# Patient Record
Sex: FEMALE | Race: WHITE | NOT HISPANIC OR LATINO | URBAN - METROPOLITAN AREA
[De-identification: names, ages, dates, MRNs, and addresses within clinical notes are randomized per-mention and may not be internally consistent; named-entity substitution may affect disease eponyms.]

---

## 2023-04-25 LAB
ALANINE AMINOTRANSFERASE (SGPT) (U/L) IN SER/PLAS: 17 U/L (ref 7–45)
ALBUMIN (G/DL) IN SER/PLAS: 4.1 G/DL (ref 3.4–5)
ALKALINE PHOSPHATASE (U/L) IN SER/PLAS: 64 U/L (ref 33–136)
ANION GAP IN SER/PLAS: 8 MMOL/L (ref 10–20)
ASPARTATE AMINOTRANSFERASE (SGOT) (U/L) IN SER/PLAS: 23 U/L (ref 9–39)
BILIRUBIN TOTAL (MG/DL) IN SER/PLAS: 0.4 MG/DL (ref 0–1.2)
CALCIUM (MG/DL) IN SER/PLAS: 9.4 MG/DL (ref 8.6–10.3)
CARBON DIOXIDE, TOTAL (MMOL/L) IN SER/PLAS: 32 MMOL/L (ref 21–32)
CHLORIDE (MMOL/L) IN SER/PLAS: 103 MMOL/L (ref 98–107)
CHOLESTEROL (MG/DL) IN SER/PLAS: 162 MG/DL (ref 0–199)
CHOLESTEROL IN HDL (MG/DL) IN SER/PLAS: 67.6 MG/DL
CHOLESTEROL/HDL RATIO: 2.4
CREATININE (MG/DL) IN SER/PLAS: 1.09 MG/DL (ref 0.5–1.05)
ERYTHROCYTE DISTRIBUTION WIDTH (RATIO) BY AUTOMATED COUNT: 13.8 % (ref 11.5–14.5)
ERYTHROCYTE MEAN CORPUSCULAR HEMOGLOBIN CONCENTRATION (G/DL) BY AUTOMATED: 32 G/DL (ref 32–36)
ERYTHROCYTE MEAN CORPUSCULAR VOLUME (FL) BY AUTOMATED COUNT: 88 FL (ref 80–100)
ERYTHROCYTES (10*6/UL) IN BLOOD BY AUTOMATED COUNT: 4.56 X10E12/L (ref 4–5.2)
GFR FEMALE: 55 ML/MIN/1.73M2
GLUCOSE (MG/DL) IN SER/PLAS: 83 MG/DL (ref 74–99)
HEMATOCRIT (%) IN BLOOD BY AUTOMATED COUNT: 40 % (ref 36–46)
HEMOGLOBIN (G/DL) IN BLOOD: 12.8 G/DL (ref 12–16)
LDL: 67 MG/DL (ref 0–99)
LEUKOCYTES (10*3/UL) IN BLOOD BY AUTOMATED COUNT: 4.2 X10E9/L (ref 4.4–11.3)
NRBC (PER 100 WBCS) BY AUTOMATED COUNT: 0 /100 WBC (ref 0–0)
PLATELETS (10*3/UL) IN BLOOD AUTOMATED COUNT: 214 X10E9/L (ref 150–450)
POTASSIUM (MMOL/L) IN SER/PLAS: 3.6 MMOL/L (ref 3.5–5.3)
PROTEIN TOTAL: 7.3 G/DL (ref 6.4–8.2)
SODIUM (MMOL/L) IN SER/PLAS: 139 MMOL/L (ref 136–145)
THYROTROPIN (MIU/L) IN SER/PLAS BY DETECTION LIMIT <= 0.05 MIU/L: 4.11 MIU/L (ref 0.44–3.98)
TRIGLYCERIDE (MG/DL) IN SER/PLAS: 135 MG/DL (ref 0–149)
UREA NITROGEN (MG/DL) IN SER/PLAS: 11 MG/DL (ref 6–23)
VLDL: 27 MG/DL (ref 0–40)

## 2023-08-17 LAB
ANION GAP IN SER/PLAS: 11 MMOL/L (ref 10–20)
CALCIUM (MG/DL) IN SER/PLAS: 9.7 MG/DL (ref 8.6–10.3)
CARBON DIOXIDE, TOTAL (MMOL/L) IN SER/PLAS: 30 MMOL/L (ref 21–32)
CHLORIDE (MMOL/L) IN SER/PLAS: 103 MMOL/L (ref 98–107)
CREATININE (MG/DL) IN SER/PLAS: 1.04 MG/DL (ref 0.5–1.05)
ERYTHROCYTE DISTRIBUTION WIDTH (RATIO) BY AUTOMATED COUNT: 13.4 % (ref 11.5–14.5)
ERYTHROCYTE MEAN CORPUSCULAR HEMOGLOBIN CONCENTRATION (G/DL) BY AUTOMATED: 32.8 G/DL (ref 32–36)
ERYTHROCYTE MEAN CORPUSCULAR VOLUME (FL) BY AUTOMATED COUNT: 85 FL (ref 80–100)
ERYTHROCYTES (10*6/UL) IN BLOOD BY AUTOMATED COUNT: 4.78 X10E12/L (ref 4–5.2)
GFR FEMALE: 59 ML/MIN/1.73M2
GLUCOSE (MG/DL) IN SER/PLAS: 96 MG/DL (ref 74–99)
HEMATOCRIT (%) IN BLOOD BY AUTOMATED COUNT: 40.8 % (ref 36–46)
HEMOGLOBIN (G/DL) IN BLOOD: 13.4 G/DL (ref 12–16)
LEUKOCYTES (10*3/UL) IN BLOOD BY AUTOMATED COUNT: 5.3 X10E9/L (ref 4.4–11.3)
NRBC (PER 100 WBCS) BY AUTOMATED COUNT: 0 /100 WBC (ref 0–0)
PLATELETS (10*3/UL) IN BLOOD AUTOMATED COUNT: 210 X10E9/L (ref 150–450)
POTASSIUM (MMOL/L) IN SER/PLAS: 3.7 MMOL/L (ref 3.5–5.3)
SODIUM (MMOL/L) IN SER/PLAS: 140 MMOL/L (ref 136–145)
UREA NITROGEN (MG/DL) IN SER/PLAS: 12 MG/DL (ref 6–23)

## 2023-08-30 ENCOUNTER — HOSPITAL ENCOUNTER (OUTPATIENT)
Dept: DATA CONVERSION | Facility: HOSPITAL | Age: 68
End: 2023-08-30
Attending: SURGERY | Admitting: SURGERY
Payer: MEDICARE

## 2023-08-30 DIAGNOSIS — N60.92 UNSPECIFIED BENIGN MAMMARY DYSPLASIA OF LEFT BREAST: ICD-10-CM

## 2023-08-30 DIAGNOSIS — C50.412 MALIGNANT NEOPLASM OF UPPER-OUTER QUADRANT OF LEFT FEMALE BREAST (MULTI): ICD-10-CM

## 2023-09-08 LAB
COMPLETE PATHOLOGY REPORT: NORMAL
CONVERTED ADDENDUM DIAGNOSIS 2: NORMAL
CONVERTED CLINICAL DIAGNOSIS-HISTORY: NORMAL
CONVERTED FINAL DIAGNOSIS: NORMAL
CONVERTED FINAL REPORT PDF LINK TO COPY AND PASTE: NORMAL
CONVERTED GROSS DESCRIPTION: NORMAL
CONVERTED INTRAOPERATIVE DIAGNOSIS: NORMAL
CONVERTED SYNOPTIC DIAGNOSIS: NORMAL

## 2023-09-26 PROBLEM — Z17.0 MALIGNANT NEOPLASM OF UPPER-OUTER QUADRANT OF LEFT BREAST IN FEMALE, ESTROGEN RECEPTOR POSITIVE (MULTI): Status: ACTIVE | Noted: 2023-09-26

## 2023-09-26 PROBLEM — R92.8 ABNORMAL MAMMOGRAM OF LEFT BREAST: Status: ACTIVE | Noted: 2023-09-26

## 2023-09-26 PROBLEM — C50.412 MALIGNANT NEOPLASM OF UPPER-OUTER QUADRANT OF LEFT BREAST IN FEMALE, ESTROGEN RECEPTOR POSITIVE (MULTI): Status: ACTIVE | Noted: 2023-09-26

## 2023-09-26 PROBLEM — C50.912 INVASIVE LOBULAR CARCINOMA OF LEFT BREAST IN FEMALE (MULTI): Status: ACTIVE | Noted: 2023-09-26

## 2023-09-29 VITALS — WEIGHT: 123.9 LBS | BODY MASS INDEX: 24.32 KG/M2 | HEIGHT: 60 IN

## 2023-09-30 NOTE — H&P
History & Physical Reviewed:   I have reviewed the History and Physical dated:  17-Aug-2023   History and Physical reviewed and relevant findings noted. Patient examined to review pertinent physical  findings.: No significant changes   Home Medications Reviewed: no changes noted   Allergies Reviewed: no changes noted       ERAS (Enhanced Recovery After Surgery):  ·  ERAS Patient: no     Consent:   COVID-19 Consent:  ·  COVID-19 Risk Consent Surgeon has reviewed key risks related to the risk of radha COVID-19 and if they contract COVID-19 what the risks are.       Electronic Signatures:  Mirian Blanchard ()  (Signed 30-Aug-2023 12:10)   Authored: History & Physical Reviewed, ERAS, Consent,  Note Completion      Last Updated: 30-Aug-2023 12:10 by Mirian Blanchard ()

## 2023-10-01 NOTE — OP NOTE
PROCEDURE DETAILS    Preoperative Diagnosis:  Primary malignant neoplasm of upper outer quadrant of left female breast, C50.412    Postoperative Diagnosis:  Primary malignant neoplasm of upper outer quadrant of left female breast, C50.412    Surgeon: Mirian Blanchard  Resident/Fellow/Other Assistant: Ida Cox    Procedure:  1. LEFT MAG SEED LOCALIZED PARTIAL MASTECTOMY   2. LYMPHATIC MAPPING WITH DUAL TRACER  3. LEFT AXILLARY SENTINEL LYMPH NODE BIOPSY    Anesthesia: No anesthesiologist associated with this case  Estimated Blood Loss: 5ml  Findings: 1. 1 axillary sentinel lymph node negative for metastasis on frozen section  2. seed, clip present on specimen xray        Operative Report:     The patient underwent pre-operative magnetic seed localization in the radiology department prior to surgery.  Localization studies were reviewed confirming appropriate localization.  In the pre-op bay the patient's left breast was marked and injected  with technetium 99 in a periareolar 4 quadrant fashion. The patient was then transported to the operative suite. A sign-in was performed verifying patient identity, procedure and laterality.  One dose of preoperative antibiotics was given.  After induction  of anesthesia, uptake into the corresponding axilla was confirmed using the handheld gamma probe.  3cc of isosulfan blue was injected in the subareolar space and massaged for several minutes. The left breast and axilla were prepped and draped in the usual  sterile fashion.   Just prior to incision, a time-out was performed confirming patient identity, procedure and laterality.  Attention was first turned toward the axilla. The handheld gamma probe was used to identify the area of greatest uptake in the  axilla and the area was marked on the skin.  An incision was made in the axilla and carried down through the subcutaneous tissue and clavipectoral fascia to gain entry into the axilla.  The handheld gamma probe was  used to identify a hot node which was  excised in its entirety.   The axilla was palpated and did not reveal any palpably abnormal or blue lymph nodes.  Frozen section was negative for guerda metastasis. The clavipectoral fascia was re-approximated with 2-0 Vicryl.  The dermis was closed with  3-0 Vicryl and the skin was close with a running 4-0 Monocryl. Attention was then turned to the breast. The sentimag probe was used to identify the location of the targeted lesion and marked on the skin.  A periareolar incision was made, and flaps were  raised in the direction of the targeted lesion.  The target lesion was then circumferentially dissected using electrocautery.  Circumferential dissection was carried out to include the targeted lesion and localization seed.  The probe was used to again  confirm the presence of the localization seed within the specimen. Once the specimen was completed dissected it was oriented with a silk suture- short suture superior, long suture lateral and passed off the field.   The Vector ink kit was used to ink  the specimen: red = superior, blue = inferior, yellow = medial, orange = lateral, green = anterior, and black = posterior. A specimen radiograph was performed which confirmed the presence of the lesion, biopsy clip, and localization seed.  Five additional  cavity margins were taken: superior, inferior, medial, lateral, and anterior with black ink denoting the new margin.  The posterior extent of dissection  did include pectoralis fascia. Clips were placed to denote the area of resection. Next, meticulous  hemostasis was achieved.  The dermis was closed with 3-0 Vicryl suture and the skin was closed with a running 4-0 Monocryl.  Benzoin and steri-strips were used as dressing.  The patient was then awoken from anesthesia and transported to the PACU in satisfactory  condition.    SPECIMENS:    1. Left axillary sentinel nodes  2. Left magseed localized partial mastectomy: short suture  superior, long lateral  3. New superior margin: ink marks new margin  4. New inferior margin: ink marks new margin  5. New medial margin: ink marks new margin  6. New lateral margin: ink marks new margin  7. New anterior margin: ink marks new margin      John Synoptic Op Report:  Breast Osseo Node Biopsy  Operation performed with curative intent: yes  Tracer(s) used to identify sentinel nodes in the upfront surgery (non-neoadjuvant) setting: dye and radioactive tracer  Tracer(s) used to identify sentinel nodes in the neoadjuvant setting: not applicable  All nodes (colored or non-colored) present at the end of a dye-filled lymphatic channel were removed: yes  All significantly radioactive nodes were removed: yes  All palpably suspicious nodes were removed: not applicable  Biopsy-proven positive nodes marked with clips prior to chemotherapy were identified and removed: not applicable                    Attestation:   Note Completion:  Attending Attestation I performed the procedure without a resident         Electronic Signatures:  Mirian Blanchard)  (Signed 30-Aug-2023 12:19)   Authored: Post-Operative Note, Chart Review, Note Completion      Last Updated: 30-Aug-2023 12:19 by Mirian Blanchard ()

## 2023-10-02 ASSESSMENT — PATIENT HEALTH QUESTIONNAIRE - PHQ9
SUM OF ALL RESPONSES TO PHQ QUESTIONS 1-9: 6
8. MOVING OR SPEAKING SO SLOWLY THAT OTHER PEOPLE COULD HAVE NOTICED. OR THE OPPOSITE, BEING SO FIGETY OR RESTLESS THAT YOU HAVE BEEN MOVING AROUND A LOT MORE THAN USUAL: 0
5. POOR APPETITE OR OVEREATING: MORE THAN HALF THE DAYS
1. LITTLE INTEREST OR PLEASURE IN DOING THINGS: NOT AT ALL
1. LITTLE INTEREST OR PLEASURE IN DOING THINGS: 0
1. LITTLE INTEREST OR PLEASURE IN DOING THINGS: NOT AT ALL
7. TROUBLE CONCENTRATING ON THINGS, SUCH AS READING THE NEWSPAPER OR WATCHING TELEVISION: SEVERAL DAYS
10. IF YOU CHECKED OFF ANY PROBLEMS, HOW DIFFICULT HAVE THESE PROBLEMS MADE IT FOR YOU TO DO YOUR WORK, TAKE CARE OF THINGS AT HOME, OR GET ALONG WITH OTHER PEOPLE: NOT DIFFICULT AT ALL
7. TROUBLE CONCENTRATING ON THINGS, SUCH AS READING THE NEWSPAPER OR WATCHING TELEVISION: 1
8. MOVING OR SPEAKING SO SLOWLY THAT OTHER PEOPLE COULD HAVE NOTICED. OR THE OPPOSITE, BEING SO FIGETY OR RESTLESS THAT YOU HAVE BEEN MOVING AROUND A LOT MORE THAN USUAL: NOT AT ALL
6. FEELING BAD ABOUT YOURSELF - OR THAT YOU ARE A FAILURE OR HAVE LET YOURSELF OR YOUR FAMILY DOWN: SEVERAL DAYS
SUM OF ALL RESPONSES TO PHQ QUESTIONS 1-9: 6
9. THOUGHTS THAT YOU WOULD BE BETTER OFF DEAD, OR OF HURTING YOURSELF: 0
9. THOUGHTS THAT YOU WOULD BE BETTER OFF DEAD, OR OF HURTING YOURSELF: NOT AT ALL
10. IF YOU CHECKED OFF ANY PROBLEMS, HOW DIFFICULT HAVE THESE PROBLEMS MADE IT FOR YOU TO DO YOUR WORK, TAKE CARE OF THINGS AT HOME, OR GET ALONG WITH OTHER PEOPLE: NOT DIFFICULT AT ALL
3. TROUBLE FALLING OR STAYING ASLEEP OR SLEEPING TOO MUCH: NOT AT ALL
2. FEELING DOWN, DEPRESSED, IRRITABLE, OR HOPELESS: SEVERAL DAYS
2. FEELING DOWN, DEPRESSED, IRRITABLE, OR HOPELESS: 1
4. FEELING TIRED OR HAVING LITTLE ENERGY: SEVERAL DAYS
9. THOUGHTS THAT YOU WOULD BE BETTER OFF DEAD, OR OF HURTING YOURSELF: NOT AT ALL
6. FEELING BAD ABOUT YOURSELF - OR THAT YOU ARE A FAILURE OR HAVE LET YOURSELF OR YOUR FAMILY DOWN: 1
3. TROUBLE FALLING OR STAYING ASLEEP OR SLEEPING TOO MUCH: NOT AT ALL
3. TROUBLE FALLING OR STAYING ASLEEP OR SLEEPING TOO MUCH: 0
7. TROUBLE CONCENTRATING ON THINGS, SUCH AS READING THE NEWSPAPER OR WATCHING TELEVISION: SEVERAL DAYS
4. FEELING TIRED OR HAVING LITTLE ENERGY: 1
5. POOR APPETITE OR OVEREATING: 2
6. FEELING BAD ABOUT YOURSELF - OR THAT YOU ARE A FAILURE OR HAVE LET YOURSELF OR YOUR FAMILY DOWN: SEVERAL DAYS
4. FEELING TIRED OR HAVING LITTLE ENERGY: SEVERAL DAYS
2. FEELING DOWN, DEPRESSED OR HOPELESS: SEVERAL DAYS
8. MOVING OR SPEAKING SO SLOWLY THAT OTHER PEOPLE COULD HAVE NOTICED. OR THE OPPOSITE, BEING SO FIGETY OR RESTLESS THAT YOU HAVE BEEN MOVING AROUND A LOT MORE THAN USUAL: NOT AT ALL
5. POOR APPETITE OR OVEREATING: MORE THAN HALF THE DAYS

## 2023-10-03 ENCOUNTER — OFFICE VISIT (OUTPATIENT)
Dept: HEMATOLOGY/ONCOLOGY | Facility: CLINIC | Age: 68
End: 2023-10-03
Payer: MEDICARE

## 2023-10-03 DIAGNOSIS — Z79.899 ENCOUNTER FOR MONITORING CARDIOTOXIC DRUG THERAPY: Primary | ICD-10-CM

## 2023-10-03 DIAGNOSIS — C50.912 INVASIVE LOBULAR CARCINOMA OF LEFT BREAST IN FEMALE (MULTI): ICD-10-CM

## 2023-10-03 DIAGNOSIS — Z51.81 ENCOUNTER FOR MONITORING CARDIOTOXIC DRUG THERAPY: Primary | ICD-10-CM

## 2023-10-03 PROCEDURE — 99214 OFFICE O/P EST MOD 30 MIN: CPT | Performed by: STUDENT IN AN ORGANIZED HEALTH CARE EDUCATION/TRAINING PROGRAM

## 2023-10-03 PROCEDURE — 1126F AMNT PAIN NOTED NONE PRSNT: CPT | Performed by: STUDENT IN AN ORGANIZED HEALTH CARE EDUCATION/TRAINING PROGRAM

## 2023-10-03 PROCEDURE — 1159F MED LIST DOCD IN RCRD: CPT | Performed by: STUDENT IN AN ORGANIZED HEALTH CARE EDUCATION/TRAINING PROGRAM

## 2023-10-03 RX ORDER — METHYLPREDNISOLONE SODIUM SUCCINATE 40 MG/ML
40 INJECTION INTRAMUSCULAR; INTRAVENOUS AS NEEDED
Status: CANCELLED | OUTPATIENT
Start: 2023-10-17

## 2023-10-03 RX ORDER — FAMOTIDINE 10 MG/ML
20 INJECTION INTRAVENOUS ONCE AS NEEDED
Status: CANCELLED | OUTPATIENT
Start: 2023-10-17

## 2023-10-03 RX ORDER — PROCHLORPERAZINE EDISYLATE 5 MG/ML
10 INJECTION INTRAMUSCULAR; INTRAVENOUS EVERY 6 HOURS PRN
Status: CANCELLED | OUTPATIENT
Start: 2023-10-17

## 2023-10-03 RX ORDER — EPINEPHRINE 0.3 MG/.3ML
0.3 INJECTION SUBCUTANEOUS EVERY 5 MIN PRN
Status: CANCELLED | OUTPATIENT
Start: 2023-10-17

## 2023-10-03 RX ORDER — PROCHLORPERAZINE MALEATE 5 MG
10 TABLET ORAL EVERY 6 HOURS PRN
Status: CANCELLED | OUTPATIENT
Start: 2023-10-17

## 2023-10-03 RX ORDER — DIPHENHYDRAMINE HYDROCHLORIDE 50 MG/ML
50 INJECTION INTRAMUSCULAR; INTRAVENOUS AS NEEDED
Status: CANCELLED | OUTPATIENT
Start: 2023-10-17

## 2023-10-03 RX ORDER — FAMOTIDINE 10 MG/ML
20 INJECTION INTRAVENOUS ONCE
Status: CANCELLED | OUTPATIENT
Start: 2023-10-17

## 2023-10-03 RX ORDER — HEPARIN SODIUM,PORCINE/PF 10 UNIT/ML
50 SYRINGE (ML) INTRAVENOUS AS NEEDED
Status: CANCELLED | OUTPATIENT
Start: 2023-10-17

## 2023-10-03 RX ORDER — ALBUTEROL SULFATE 0.83 MG/ML
3 SOLUTION RESPIRATORY (INHALATION) AS NEEDED
Status: CANCELLED | OUTPATIENT
Start: 2023-10-17

## 2023-10-03 RX ORDER — DEXAMETHASONE SODIUM PHOSPHATE 100 MG/10ML
10 INJECTION INTRAMUSCULAR; INTRAVENOUS ONCE
Status: CANCELLED | OUTPATIENT
Start: 2023-10-17

## 2023-10-03 RX ORDER — DIPHENHYDRAMINE HCL 50 MG
50 CAPSULE ORAL ONCE
Status: CANCELLED | OUTPATIENT
Start: 2023-10-17

## 2023-10-03 RX ORDER — HEPARIN 100 UNIT/ML
500 SYRINGE INTRAVENOUS AS NEEDED
Status: CANCELLED | OUTPATIENT
Start: 2023-10-17

## 2023-10-03 ASSESSMENT — PAIN SCALES - GENERAL: PAINLEVEL: 0-NO PAIN

## 2023-10-03 NOTE — PROGRESS NOTES
Patient ID: Zina Hernandez is a 67 y.o. female.    The patient presents to clinic today for her history of breast cancer.    Diagnostic/Therapeutic History:  Cancer History:          Breast         AJCC Edition: 8th (AJCC), Diagnosis Date: 30-Aug-2023, IA, pT1c pN0 cM0 G2     Treatment Synopsis:    - On 6/14/2023 screening mammogram she had 2 adjacent irregular spiculated masses in the upper outer left breast at middle depth.  No suspicious masses or calcification  in the right breast.  BI-RADS Category 0     -On 6/28/2023 she had targeted ultrasound that showed at 1:00, 4 cm from the nipple an irregular hypoechoic mass measuring 3 x 5 x 4 mm this corresponds to the mammographic finding.  Left axilla showed 4 morphologically normal lymph nodes without lymphadenopathy      -On 7/14/2023 she had left breast mass ultrasound guided core needle  invasive lobular carcinoma, grade 2 with  ER 95%, [place] 20%, HER2 positive 3+, also had lobular carcinoma in situ     -On 8/30/2023 Dr. Blanchard performed left partial mastectomy with sentinel lymph node biopsy (0/1) 2 foci of cancer largest 1 measuring 13 mm, second 1 measuring 7 mm, margins were negative final stage PT1CN0     History of Present Illness (HPI)/Interval History:    Patient overall doing fine, recovering well from surgery, overall overwhelmed about her recent diagnosis of breast cancer.  .  No headaches  no blurred vision, no chest pain or shortness of breath.  No abdominal pain no nausea no vomiting.  Appetite okay weight is maintained. All questions have been answered today.     14 point review of system otherwise unremarkable     PMH: as above     Meds: allergies reviewed      Reproductive history: Menarche at 13, AFLB: 27,  menopause at 50, no HRT     Family history maternal grandmother with uterine cancer in her 60s    Review of Systems:  14-point ROS otherwise negative, as per HPI.    History reviewed. No pertinent past medical history.    History reviewed. No  "pertinent surgical history.    Social History     Socioeconomic History    Marital status:      Spouse name: None    Number of children: None    Years of education: None    Highest education level: None   Occupational History    None   Tobacco Use    Smoking status: None    Smokeless tobacco: None   Substance and Sexual Activity    Alcohol use: None    Drug use: None    Sexual activity: None   Other Topics Concern    None   Social History Narrative    None     Social Determinants of Health     Financial Resource Strain: Not on file   Food Insecurity: Not on file   Transportation Needs: Not on file   Physical Activity: Not on file   Stress: Not on file   Social Connections: Not on file   Intimate Partner Violence: Not on file   Housing Stability: Not on file       Allergies   Allergen Reactions    Tamiflu [Oseltamivir] Unknown       No current outpatient medications on file.     Objective    BSA: 1.52 meters squared  /83 (BP Location: Left arm, Patient Position: Sitting)   Pulse 79   Temp 36.7 °C (98.1 °F) (Temporal)   Resp 16   Ht (S) 1.506 m (4' 11.29\")   Wt 55.5 kg (122 lb 5.7 oz)   SpO2 93%   BMI 24.47 kg/m²     ECOG Performance Status:  1 Restricted in physically strenuous activity but ambulatory and able to carry out work of a light or sedentary nature, e.g., light house work, office work.      Physical Exam    Constitutional: Well developed, awake/alert/oriented  x3, no distress, alert and cooperative   Eyes: PERRL, EOMI, clear sclera   ENMT: mucous membranes moist, no apparent injury,  no lesions seen   Head/Neck: Neck supple, no apparent injury, thyroid  without mass or tenderness, No JVD, trachea midline, no bruits   Respiratory/Thorax: Patent airways, CTAB, normal  breath sounds with good chest expansion, thorax symmetric   Cardiovascular: Regular, rate and rhythm, no murmurs,  2+ equal pulses of the extremities, normal S 1and S 2   Gastrointestinal: Nondistended, soft, non-tender,  no " rebound tenderness or guarding, no masses palpable, no organomegaly, +BS, no bruits   Extremities: normal extremities, no cyanosis edema,  contusions or wounds, no clubbing   Breast: Left surgical incision healing well, +seroma,  no new nodules or lymphadenopathy.  No right breast nodules or lymphadenopathy        Laboratory Data:  Lab Results   Component Value Date    WBC 5.3 08/17/2023    HGB 13.4 08/17/2023    HCT 40.8 08/17/2023    MCV 85 08/17/2023     08/17/2023       Chemistry    Lab Results   Component Value Date/Time     08/17/2023 0822    K 3.7 08/17/2023 0822     08/17/2023 0822    CO2 30 08/17/2023 0822    BUN 12 08/17/2023 0822    CREATININE 1.04 08/17/2023 0822    Lab Results   Component Value Date/Time    CALCIUM 9.7 08/17/2023 0822    ALKPHOS 64 04/25/2023 0712    AST 23 04/25/2023 0712    ALT 17 04/25/2023 0712    BILITOT 0.4 04/25/2023 0712                  Assessment/Plan:       67-year-old female previously healthy found to have on screening mammogram 2 adjacent irregular spiculated masses in the upper outer left breast at middle depth with  US showing a 5mm mass with left axilla showing 4 negative LN s/p  guided core needle  invasive lobular carcinoma, grade 2 with  ER 95%, [place] 20%, HER2 positive 3+, also had lobular carcinoma in situ. Dr. Blanchard performed left partial mastectomy with sentinel  lymph node biopsy (0/1) 2 foci of cancer largest 1 measuring 13 mm, second 1 measuring 7 mm, margins were negative final stage PT1CN0. She is presenting to discuss adjuvant treatment options.      I reviewed with her the events that led to her diagnosis of breast cancer. We reviewed all the procedures and diagnostic imaging she underwent thus far. I discussed the features of her breast cancer that include the size, grade, lymph node status and  hormone receptor/ her2-ying status.     Based on APT trial recommended to proceed with THx12 weeks followed by radiation followed by hormonal  therapy and herceptin to complete for a total of 1 year     Based on the updated 10 year results of the APT trial published in the lancet in March of this year:      410 patients were enrolled and 406 were given adjuvant paclitaxel and trastuzumab and included in the analysis. Mean age at enrolment was 55 years (SD 10·5), 405 (99·8%) of 406 patients were female and one (0·2%) was male, 350 (86·2%) were  White, 28 (6·9%) were Black or , and 272 (67·0%) had hormone receptor-positive disease. After a median follow-up of 10·8 years (IQR 7·1-11·4), among 406 patients included in the analysis population, we observed 31  invasive disease-free survival events, of which six (19·4%) were locoregional ipsilateral recurrences, nine (29·0%) were new contralateral breast cancers, six (19·4%) were distant recurrences, and ten (32·3%) were all-cause deaths.  10-year invasive disease-free survival was 91·3% (95% CI 88·3-94·4), 10-year recurrence-free interval was 96·3% (95% CI 94·3-98·3), 10-year overall survival was 94·3% (95% CI 91·8-96·8), and 10-year breast  cancer-specific survival was 98·8% (95% CI 97·6-100)                        Plan:    - plan to do dose reduced taxol 60mg/m2 and herceptin weekly x12 weeks, followed by radiation therapy followed hormonal therapy followed  by herceptin for a total of 1 year.      - RTC in 2 weeks      At least 60 minutes of direct consultation was spent reviewing the patient's chart as well as discussing and  reviewing the  cancer care plan including educating and answering  questions and concerns, greater than 50 percent spent in counseling and coordination  of care.    Cony Amin MD  Hematology and Medical Oncology  [facility]

## 2023-10-03 NOTE — H&P (VIEW-ONLY)
Patient ID: Zina Hernandez is a 67 y.o. female.    The patient presents to clinic today for her history of breast cancer.    Diagnostic/Therapeutic History:  Cancer History:          Breast         AJCC Edition: 8th (AJCC), Diagnosis Date: 30-Aug-2023, IA, pT1c pN0 cM0 G2     Treatment Synopsis:    - On 6/14/2023 screening mammogram she had 2 adjacent irregular spiculated masses in the upper outer left breast at middle depth.  No suspicious masses or calcification  in the right breast.  BI-RADS Category 0     -On 6/28/2023 she had targeted ultrasound that showed at 1:00, 4 cm from the nipple an irregular hypoechoic mass measuring 3 x 5 x 4 mm this corresponds to the mammographic finding.  Left axilla showed 4 morphologically normal lymph nodes without lymphadenopathy      -On 7/14/2023 she had left breast mass ultrasound guided core needle  invasive lobular carcinoma, grade 2 with  ER 95%, [place] 20%, HER2 positive 3+, also had lobular carcinoma in situ     -On 8/30/2023 Dr. Blanchard performed left partial mastectomy with sentinel lymph node biopsy (0/1) 2 foci of cancer largest 1 measuring 13 mm, second 1 measuring 7 mm, margins were negative final stage PT1CN0     History of Present Illness (HPI)/Interval History:    Patient overall doing fine, recovering well from surgery, overall overwhelmed about her recent diagnosis of breast cancer.  .  No headaches  no blurred vision, no chest pain or shortness of breath.  No abdominal pain no nausea no vomiting.  Appetite okay weight is maintained. All questions have been answered today.     14 point review of system otherwise unremarkable     PMH: as above     Meds: allergies reviewed      Reproductive history: Menarche at 13, AFLB: 27,  menopause at 50, no HRT     Family history maternal grandmother with uterine cancer in her 60s    Review of Systems:  14-point ROS otherwise negative, as per HPI.    History reviewed. No pertinent past medical history.    History reviewed. No  "pertinent surgical history.    Social History     Socioeconomic History    Marital status:      Spouse name: None    Number of children: None    Years of education: None    Highest education level: None   Occupational History    None   Tobacco Use    Smoking status: None    Smokeless tobacco: None   Substance and Sexual Activity    Alcohol use: None    Drug use: None    Sexual activity: None   Other Topics Concern    None   Social History Narrative    None     Social Determinants of Health     Financial Resource Strain: Not on file   Food Insecurity: Not on file   Transportation Needs: Not on file   Physical Activity: Not on file   Stress: Not on file   Social Connections: Not on file   Intimate Partner Violence: Not on file   Housing Stability: Not on file       Allergies   Allergen Reactions    Tamiflu [Oseltamivir] Unknown       No current outpatient medications on file.     Objective    BSA: 1.52 meters squared  /83 (BP Location: Left arm, Patient Position: Sitting)   Pulse 79   Temp 36.7 °C (98.1 °F) (Temporal)   Resp 16   Ht (S) 1.506 m (4' 11.29\")   Wt 55.5 kg (122 lb 5.7 oz)   SpO2 93%   BMI 24.47 kg/m²     ECOG Performance Status:  1 Restricted in physically strenuous activity but ambulatory and able to carry out work of a light or sedentary nature, e.g., light house work, office work.      Physical Exam    Constitutional: Well developed, awake/alert/oriented  x3, no distress, alert and cooperative   Eyes: PERRL, EOMI, clear sclera   ENMT: mucous membranes moist, no apparent injury,  no lesions seen   Head/Neck: Neck supple, no apparent injury, thyroid  without mass or tenderness, No JVD, trachea midline, no bruits   Respiratory/Thorax: Patent airways, CTAB, normal  breath sounds with good chest expansion, thorax symmetric   Cardiovascular: Regular, rate and rhythm, no murmurs,  2+ equal pulses of the extremities, normal S 1and S 2   Gastrointestinal: Nondistended, soft, non-tender,  no " rebound tenderness or guarding, no masses palpable, no organomegaly, +BS, no bruits   Extremities: normal extremities, no cyanosis edema,  contusions or wounds, no clubbing   Breast: Left surgical incision healing well, +seroma,  no new nodules or lymphadenopathy.  No right breast nodules or lymphadenopathy        Laboratory Data:  Lab Results   Component Value Date    WBC 5.3 08/17/2023    HGB 13.4 08/17/2023    HCT 40.8 08/17/2023    MCV 85 08/17/2023     08/17/2023       Chemistry    Lab Results   Component Value Date/Time     08/17/2023 0822    K 3.7 08/17/2023 0822     08/17/2023 0822    CO2 30 08/17/2023 0822    BUN 12 08/17/2023 0822    CREATININE 1.04 08/17/2023 0822    Lab Results   Component Value Date/Time    CALCIUM 9.7 08/17/2023 0822    ALKPHOS 64 04/25/2023 0712    AST 23 04/25/2023 0712    ALT 17 04/25/2023 0712    BILITOT 0.4 04/25/2023 0712                  Assessment/Plan:       67-year-old female previously healthy found to have on screening mammogram 2 adjacent irregular spiculated masses in the upper outer left breast at middle depth with  US showing a 5mm mass with left axilla showing 4 negative LN s/p  guided core needle  invasive lobular carcinoma, grade 2 with  ER 95%, [place] 20%, HER2 positive 3+, also had lobular carcinoma in situ. Dr. Blanchard performed left partial mastectomy with sentinel  lymph node biopsy (0/1) 2 foci of cancer largest 1 measuring 13 mm, second 1 measuring 7 mm, margins were negative final stage PT1CN0. She is presenting to discuss adjuvant treatment options.      I reviewed with her the events that led to her diagnosis of breast cancer. We reviewed all the procedures and diagnostic imaging she underwent thus far. I discussed the features of her breast cancer that include the size, grade, lymph node status and  hormone receptor/ her2-ying status.     Based on APT trial recommended to proceed with THx12 weeks followed by radiation followed by hormonal  therapy and herceptin to complete for a total of 1 year     Based on the updated 10 year results of the APT trial published in the lancet in March of this year:      410 patients were enrolled and 406 were given adjuvant paclitaxel and trastuzumab and included in the analysis. Mean age at enrolment was 55 years (SD 10·5), 405 (99·8%) of 406 patients were female and one (0·2%) was male, 350 (86·2%) were  White, 28 (6·9%) were Black or , and 272 (67·0%) had hormone receptor-positive disease. After a median follow-up of 10·8 years (IQR 7·1-11·4), among 406 patients included in the analysis population, we observed 31  invasive disease-free survival events, of which six (19·4%) were locoregional ipsilateral recurrences, nine (29·0%) were new contralateral breast cancers, six (19·4%) were distant recurrences, and ten (32·3%) were all-cause deaths.  10-year invasive disease-free survival was 91·3% (95% CI 88·3-94·4), 10-year recurrence-free interval was 96·3% (95% CI 94·3-98·3), 10-year overall survival was 94·3% (95% CI 91·8-96·8), and 10-year breast  cancer-specific survival was 98·8% (95% CI 97·6-100)                        Plan:    - plan to do dose reduced taxol 60mg/m2 and herceptin weekly x12 weeks, followed by radiation therapy followed hormonal therapy followed  by herceptin for a total of 1 year.      - RTC in 2 weeks      At least 60 minutes of direct consultation was spent reviewing the patient's chart as well as discussing and  reviewing the  cancer care plan including educating and answering  questions and concerns, greater than 50 percent spent in counseling and coordination  of care.    Cony Amin MD  Hematology and Medical Oncology  [facility]

## 2023-10-03 NOTE — PATIENT INSTRUCTIONS
If you have any questions or concerns please call [phone]  Schedule your Echocardiogram and Mediport placement   Schedule a follow up visit with Dr Amin and your first treatment for on 10/17  Please have labs drawn

## 2023-10-04 ENCOUNTER — TELEPHONE (OUTPATIENT)
Dept: HEMATOLOGY/ONCOLOGY | Facility: HOSPITAL | Age: 68
End: 2023-10-04
Payer: MEDICARE

## 2023-10-04 VITALS
BODY MASS INDEX: 24.67 KG/M2 | WEIGHT: 122.36 LBS | HEART RATE: 79 BPM | HEIGHT: 59 IN | SYSTOLIC BLOOD PRESSURE: 151 MMHG | DIASTOLIC BLOOD PRESSURE: 83 MMHG | RESPIRATION RATE: 16 BRPM | TEMPERATURE: 98.1 F | OXYGEN SATURATION: 93 %

## 2023-10-04 NOTE — TELEPHONE ENCOUNTER
RN called and spoke with patient directly. Reviewed schedule for Echocardiogram. Answered all other questions.

## 2023-10-09 ENCOUNTER — LAB (OUTPATIENT)
Dept: LAB | Facility: LAB | Age: 68
End: 2023-10-09
Payer: MEDICARE

## 2023-10-09 DIAGNOSIS — C50.912 INVASIVE LOBULAR CARCINOMA OF LEFT BREAST IN FEMALE (MULTI): ICD-10-CM

## 2023-10-09 LAB
ALBUMIN SERPL BCP-MCNC: 4 G/DL (ref 3.4–5)
ALP SERPL-CCNC: 54 U/L (ref 33–136)
ALT SERPL W P-5'-P-CCNC: 12 U/L (ref 7–45)
ANION GAP SERPL CALC-SCNC: 10 MMOL/L (ref 10–20)
AST SERPL W P-5'-P-CCNC: 19 U/L (ref 9–39)
BASOPHILS # BLD AUTO: 0.03 X10*3/UL (ref 0–0.1)
BASOPHILS NFR BLD AUTO: 0.7 %
BILIRUB SERPL-MCNC: 0.3 MG/DL (ref 0–1.2)
BUN SERPL-MCNC: 10 MG/DL (ref 6–23)
CALCIUM SERPL-MCNC: 9.2 MG/DL (ref 8.6–10.3)
CHLORIDE SERPL-SCNC: 102 MMOL/L (ref 98–107)
CO2 SERPL-SCNC: 31 MMOL/L (ref 21–32)
CREAT SERPL-MCNC: 1.03 MG/DL (ref 0.5–1.05)
EOSINOPHIL # BLD AUTO: 0.09 X10*3/UL (ref 0–0.7)
EOSINOPHIL NFR BLD AUTO: 2 %
ERYTHROCYTE [DISTWIDTH] IN BLOOD BY AUTOMATED COUNT: 14.1 % (ref 11.5–14.5)
GFR SERPL CREATININE-BSD FRML MDRD: 59 ML/MIN/1.73M*2
GLUCOSE SERPL-MCNC: 97 MG/DL (ref 74–99)
HCT VFR BLD AUTO: 37.3 % (ref 36–46)
HGB BLD-MCNC: 12 G/DL (ref 12–16)
IMM GRANULOCYTES # BLD AUTO: 0 X10*3/UL (ref 0–0.7)
IMM GRANULOCYTES NFR BLD AUTO: 0 % (ref 0–0.9)
LYMPHOCYTES # BLD AUTO: 1.89 X10*3/UL (ref 1.2–4.8)
LYMPHOCYTES NFR BLD AUTO: 41.8 %
MCH RBC QN AUTO: 28 PG (ref 26–34)
MCHC RBC AUTO-ENTMCNC: 32.2 G/DL (ref 32–36)
MCV RBC AUTO: 87 FL (ref 80–100)
MONOCYTES # BLD AUTO: 0.46 X10*3/UL (ref 0.1–1)
MONOCYTES NFR BLD AUTO: 10.2 %
NEUTROPHILS # BLD AUTO: 2.05 X10*3/UL (ref 1.2–7.7)
NEUTROPHILS NFR BLD AUTO: 45.3 %
NRBC BLD-RTO: 0 /100 WBCS (ref 0–0)
PLATELET # BLD AUTO: 259 X10*3/UL (ref 150–450)
PMV BLD AUTO: 9.5 FL (ref 7.5–11.5)
POTASSIUM SERPL-SCNC: 4.2 MMOL/L (ref 3.5–5.3)
PROT SERPL-MCNC: 6.8 G/DL (ref 6.4–8.2)
RBC # BLD AUTO: 4.29 X10*6/UL (ref 4–5.2)
SODIUM SERPL-SCNC: 139 MMOL/L (ref 136–145)
WBC # BLD AUTO: 4.5 X10*3/UL (ref 4.4–11.3)

## 2023-10-09 PROCEDURE — 80053 COMPREHEN METABOLIC PANEL: CPT

## 2023-10-09 PROCEDURE — 85025 COMPLETE CBC W/AUTO DIFF WBC: CPT

## 2023-10-09 PROCEDURE — 36415 COLL VENOUS BLD VENIPUNCTURE: CPT

## 2023-10-11 ENCOUNTER — HOSPITAL ENCOUNTER (OUTPATIENT)
Dept: RADIOLOGY | Facility: HOSPITAL | Age: 68
Discharge: HOME | End: 2023-10-11
Attending: STUDENT IN AN ORGANIZED HEALTH CARE EDUCATION/TRAINING PROGRAM
Payer: MEDICARE

## 2023-10-11 VITALS
BODY MASS INDEX: 24.6 KG/M2 | RESPIRATION RATE: 18 BRPM | WEIGHT: 122 LBS | HEART RATE: 88 BPM | OXYGEN SATURATION: 98 % | TEMPERATURE: 97.9 F | SYSTOLIC BLOOD PRESSURE: 120 MMHG | DIASTOLIC BLOOD PRESSURE: 83 MMHG | HEIGHT: 59 IN

## 2023-10-11 DIAGNOSIS — Z17.0 MALIGNANT NEOPLASM OF UPPER-OUTER QUADRANT OF LEFT BREAST IN FEMALE, ESTROGEN RECEPTOR POSITIVE (MULTI): Primary | ICD-10-CM

## 2023-10-11 DIAGNOSIS — C50.912 INVASIVE LOBULAR CARCINOMA OF LEFT BREAST IN FEMALE (MULTI): ICD-10-CM

## 2023-10-11 DIAGNOSIS — C50.412 MALIGNANT NEOPLASM OF UPPER-OUTER QUADRANT OF LEFT BREAST IN FEMALE, ESTROGEN RECEPTOR POSITIVE (MULTI): Primary | ICD-10-CM

## 2023-10-11 LAB
ERYTHROCYTE [DISTWIDTH] IN BLOOD BY AUTOMATED COUNT: 14.1 % (ref 11.5–14.5)
HCT VFR BLD AUTO: 40.3 % (ref 36–46)
HGB BLD-MCNC: 12.9 G/DL (ref 12–16)
INR PPP: 1 (ref 0.9–1.1)
MCH RBC QN AUTO: 27.6 PG (ref 26–34)
MCHC RBC AUTO-ENTMCNC: 32 G/DL (ref 32–36)
MCV RBC AUTO: 86 FL (ref 80–100)
NRBC BLD-RTO: 0 /100 WBCS (ref 0–0)
PLATELET # BLD AUTO: 246 X10*3/UL (ref 150–450)
PMV BLD AUTO: 9.4 FL (ref 7.5–11.5)
PROTHROMBIN TIME: 11.7 SECONDS (ref 9.8–12.8)
RBC # BLD AUTO: 4.68 X10*6/UL (ref 4–5.2)
WBC # BLD AUTO: 4.2 X10*3/UL (ref 4.4–11.3)

## 2023-10-11 PROCEDURE — 36010 PLACE CATHETER IN VEIN: CPT | Mod: 59

## 2023-10-11 PROCEDURE — 76937 US GUIDE VASCULAR ACCESS: CPT | Performed by: STUDENT IN AN ORGANIZED HEALTH CARE EDUCATION/TRAINING PROGRAM

## 2023-10-11 PROCEDURE — 7100000002 HC RECOVERY ROOM TIME - EACH INCREMENTAL 1 MINUTE

## 2023-10-11 PROCEDURE — 76937 US GUIDE VASCULAR ACCESS: CPT

## 2023-10-11 PROCEDURE — 2500000004 HC RX 250 GENERAL PHARMACY W/ HCPCS (ALT 636 FOR OP/ED): Performed by: STUDENT IN AN ORGANIZED HEALTH CARE EDUCATION/TRAINING PROGRAM

## 2023-10-11 PROCEDURE — 36415 COLL VENOUS BLD VENIPUNCTURE: CPT | Performed by: RADIOLOGY

## 2023-10-11 PROCEDURE — 7100000001 HC RECOVERY ROOM TIME - INITIAL BASE CHARGE

## 2023-10-11 PROCEDURE — 2720000007 HC OR 272 NO HCPCS

## 2023-10-11 PROCEDURE — 77001 FLUOROGUIDE FOR VEIN DEVICE: CPT | Mod: 59

## 2023-10-11 PROCEDURE — 36561 INSERT TUNNELED CV CATH: CPT

## 2023-10-11 PROCEDURE — C1788 PORT, INDWELLING, IMP: HCPCS

## 2023-10-11 PROCEDURE — 99153 MOD SED SAME PHYS/QHP EA: CPT | Performed by: STUDENT IN AN ORGANIZED HEALTH CARE EDUCATION/TRAINING PROGRAM

## 2023-10-11 PROCEDURE — 77001 FLUOROGUIDE FOR VEIN DEVICE: CPT | Performed by: STUDENT IN AN ORGANIZED HEALTH CARE EDUCATION/TRAINING PROGRAM

## 2023-10-11 PROCEDURE — 85027 COMPLETE CBC AUTOMATED: CPT | Performed by: RADIOLOGY

## 2023-10-11 PROCEDURE — 2780000003 HC OR 278 NO HCPCS

## 2023-10-11 PROCEDURE — 36561 INSERT TUNNELED CV CATH: CPT | Performed by: STUDENT IN AN ORGANIZED HEALTH CARE EDUCATION/TRAINING PROGRAM

## 2023-10-11 PROCEDURE — 85610 PROTHROMBIN TIME: CPT | Performed by: RADIOLOGY

## 2023-10-11 PROCEDURE — 99152 MOD SED SAME PHYS/QHP 5/>YRS: CPT | Performed by: STUDENT IN AN ORGANIZED HEALTH CARE EDUCATION/TRAINING PROGRAM

## 2023-10-11 PROCEDURE — 36010 PLACE CATHETER IN VEIN: CPT | Performed by: STUDENT IN AN ORGANIZED HEALTH CARE EDUCATION/TRAINING PROGRAM

## 2023-10-11 RX ORDER — SODIUM CHLORIDE 9 MG/ML
50 INJECTION, SOLUTION INTRAVENOUS CONTINUOUS
Status: DISCONTINUED | OUTPATIENT
Start: 2023-10-11 | End: 2023-10-20 | Stop reason: HOSPADM

## 2023-10-11 RX ORDER — CEFAZOLIN SODIUM 1 G/50ML
1 SOLUTION INTRAVENOUS ONCE
Status: COMPLETED | OUTPATIENT
Start: 2023-10-11 | End: 2023-10-11

## 2023-10-11 RX ORDER — FENTANYL CITRATE 50 UG/ML
INJECTION, SOLUTION INTRAMUSCULAR; INTRAVENOUS AS NEEDED
Status: COMPLETED | OUTPATIENT
Start: 2023-10-11 | End: 2023-10-11

## 2023-10-11 RX ORDER — MIDAZOLAM HYDROCHLORIDE 1 MG/ML
INJECTION, SOLUTION INTRAMUSCULAR; INTRAVENOUS AS NEEDED
Status: COMPLETED | OUTPATIENT
Start: 2023-10-11 | End: 2023-10-11

## 2023-10-11 RX ADMIN — FENTANYL CITRATE 50 MCG: 50 INJECTION, SOLUTION INTRAMUSCULAR; INTRAVENOUS at 08:34

## 2023-10-11 RX ADMIN — MIDAZOLAM 2 MG: 1 INJECTION INTRAMUSCULAR; INTRAVENOUS at 08:34

## 2023-10-11 RX ADMIN — CEFAZOLIN SODIUM 1 G: 1 INJECTION, SOLUTION INTRAVENOUS at 08:35

## 2023-10-11 RX ADMIN — FENTANYL CITRATE 50 MCG: 50 INJECTION, SOLUTION INTRAMUSCULAR; INTRAVENOUS at 08:43

## 2023-10-11 ASSESSMENT — PAIN SCALES - GENERAL
PAINLEVEL_OUTOF10: 0 - NO PAIN

## 2023-10-11 ASSESSMENT — PAIN - FUNCTIONAL ASSESSMENT: PAIN_FUNCTIONAL_ASSESSMENT: 0-10

## 2023-10-11 NOTE — Clinical Note
Procedure end time. Right chest mediport placed. Port entry site closed with absorbable sutures and steristrips. Venotomy closed with steristrips. Both sites free from bleeding/hematoma, covered with 4'4 and tegaderm. Patient tolerated procedure well, denies pain/nausea. Final dressings being applied by Surgical Technician at this time.

## 2023-10-11 NOTE — DISCHARGE INSTRUCTIONS
1) No heavy physical activity, exercise, lifting, for 2 weeks after discharge. 2) No bending, lifting, stooping or carrying over 10 pounds for 3 days. 3) Instruct patient not to drive a car for 24 hours due to sedation medications. 4) No aspirin, non-steroidal anti-inflammatory drugs (e.g. Ibuprofen, Motrin, Advil, etc.), antihistamines or benzodiazepines (e.g. Valium, Halcion,etc.) for 1 week. 5) Patient to call physician for fever (temp greater than 100 degrees F, lightheadedness, dysuria, gross hematuria or dark urine, abdominal, groin, or severe back pain.

## 2023-10-11 NOTE — POST-PROCEDURE NOTE
Interventional Radiology Brief Postprocedure Note    Attending: Narendra Page MD     Assistant: none    Diagnosis: left breast cancer    Description of procedure: mediport placement     Anesthesia:  moderate sedation    Complications: None    Estimated Blood Loss: none    Medications (Filter: Administrations occurring from 0801 to 0829 on 10/11/23) As of 10/11/23 0829      None          No specimens collected      See detailed result report with images in PACS.    The patient tolerated the procedure well without incident or complication and is in stable condition.

## 2023-10-13 ENCOUNTER — HOSPITAL ENCOUNTER (OUTPATIENT)
Dept: CARDIOLOGY | Facility: CLINIC | Age: 68
Discharge: HOME | End: 2023-10-13
Payer: MEDICARE

## 2023-10-13 VITALS
WEIGHT: 122 LBS | HEIGHT: 59 IN | DIASTOLIC BLOOD PRESSURE: 83 MMHG | BODY MASS INDEX: 24.6 KG/M2 | SYSTOLIC BLOOD PRESSURE: 120 MMHG

## 2023-10-13 DIAGNOSIS — Z51.81 ENCOUNTER FOR MONITORING CARDIOTOXIC DRUG THERAPY: ICD-10-CM

## 2023-10-13 DIAGNOSIS — Z79.899 ENCOUNTER FOR MONITORING CARDIOTOXIC DRUG THERAPY: ICD-10-CM

## 2023-10-13 DIAGNOSIS — C50.912 INVASIVE LOBULAR CARCINOMA OF LEFT BREAST IN FEMALE (MULTI): ICD-10-CM

## 2023-10-13 LAB — EJECTION FRACTION APICAL 4 CHAMBER: 66.2

## 2023-10-13 PROCEDURE — 93356 MYOCRD STRAIN IMG SPCKL TRCK: CPT | Performed by: INTERNAL MEDICINE

## 2023-10-13 PROCEDURE — 93306 TTE W/DOPPLER COMPLETE: CPT

## 2023-10-13 PROCEDURE — 93306 TTE W/DOPPLER COMPLETE: CPT | Performed by: INTERNAL MEDICINE

## 2023-10-13 PROCEDURE — 76376 3D RENDER W/INTRP POSTPROCES: CPT | Performed by: INTERNAL MEDICINE

## 2023-10-16 ENCOUNTER — TELEPHONE (OUTPATIENT)
Dept: HEMATOLOGY/ONCOLOGY | Facility: HOSPITAL | Age: 68
End: 2023-10-16

## 2023-10-16 NOTE — TELEPHONE ENCOUNTER
Patient is calling about her infusion being before the appt with Dr. Amin she would like a call back.

## 2023-10-17 ENCOUNTER — OFFICE VISIT (OUTPATIENT)
Dept: HEMATOLOGY/ONCOLOGY | Facility: CLINIC | Age: 68
End: 2023-10-17
Payer: MEDICARE

## 2023-10-17 ENCOUNTER — INFUSION (OUTPATIENT)
Dept: HEMATOLOGY/ONCOLOGY | Facility: CLINIC | Age: 68
End: 2023-10-17
Payer: MEDICARE

## 2023-10-17 VITALS
HEIGHT: 59 IN | SYSTOLIC BLOOD PRESSURE: 154 MMHG | TEMPERATURE: 97.7 F | DIASTOLIC BLOOD PRESSURE: 83 MMHG | WEIGHT: 121.69 LBS | HEART RATE: 87 BPM | BODY MASS INDEX: 24.53 KG/M2 | RESPIRATION RATE: 16 BRPM

## 2023-10-17 DIAGNOSIS — C50.912 INVASIVE LOBULAR CARCINOMA OF LEFT BREAST IN FEMALE (MULTI): Primary | ICD-10-CM

## 2023-10-17 DIAGNOSIS — C50.912 INVASIVE LOBULAR CARCINOMA OF LEFT BREAST IN FEMALE (MULTI): ICD-10-CM

## 2023-10-17 PROCEDURE — 96413 CHEMO IV INFUSION 1 HR: CPT

## 2023-10-17 PROCEDURE — 99214 OFFICE O/P EST MOD 30 MIN: CPT | Mod: 25 | Performed by: STUDENT IN AN ORGANIZED HEALTH CARE EDUCATION/TRAINING PROGRAM

## 2023-10-17 PROCEDURE — 96523 IRRIG DRUG DELIVERY DEVICE: CPT

## 2023-10-17 PROCEDURE — 96411 CHEMO IV PUSH ADDL DRUG: CPT

## 2023-10-17 PROCEDURE — 1159F MED LIST DOCD IN RCRD: CPT | Performed by: STUDENT IN AN ORGANIZED HEALTH CARE EDUCATION/TRAINING PROGRAM

## 2023-10-17 PROCEDURE — 99214 OFFICE O/P EST MOD 30 MIN: CPT | Performed by: STUDENT IN AN ORGANIZED HEALTH CARE EDUCATION/TRAINING PROGRAM

## 2023-10-17 PROCEDURE — 2500000004 HC RX 250 GENERAL PHARMACY W/ HCPCS (ALT 636 FOR OP/ED): Performed by: STUDENT IN AN ORGANIZED HEALTH CARE EDUCATION/TRAINING PROGRAM

## 2023-10-17 PROCEDURE — 96375 TX/PRO/DX INJ NEW DRUG ADDON: CPT

## 2023-10-17 PROCEDURE — 1126F AMNT PAIN NOTED NONE PRSNT: CPT | Performed by: STUDENT IN AN ORGANIZED HEALTH CARE EDUCATION/TRAINING PROGRAM

## 2023-10-17 PROCEDURE — 1036F TOBACCO NON-USER: CPT | Performed by: STUDENT IN AN ORGANIZED HEALTH CARE EDUCATION/TRAINING PROGRAM

## 2023-10-17 RX ORDER — ALBUTEROL SULFATE 0.83 MG/ML
3 SOLUTION RESPIRATORY (INHALATION) AS NEEDED
Status: DISCONTINUED | OUTPATIENT
Start: 2023-10-17 | End: 2023-10-17 | Stop reason: HOSPADM

## 2023-10-17 RX ORDER — DIPHENHYDRAMINE HYDROCHLORIDE 50 MG/ML
50 INJECTION INTRAMUSCULAR; INTRAVENOUS ONCE
Status: CANCELLED
Start: 2023-10-17 | End: 2023-10-17

## 2023-10-17 RX ORDER — FAMOTIDINE 10 MG/ML
20 INJECTION INTRAVENOUS ONCE
Status: COMPLETED | OUTPATIENT
Start: 2023-10-17 | End: 2023-10-17

## 2023-10-17 RX ORDER — PROCHLORPERAZINE MALEATE 5 MG
10 TABLET ORAL EVERY 6 HOURS PRN
Status: CANCELLED | OUTPATIENT
Start: 2023-10-31

## 2023-10-17 RX ORDER — DIPHENHYDRAMINE HYDROCHLORIDE 50 MG/ML
50 INJECTION INTRAMUSCULAR; INTRAVENOUS ONCE
Status: CANCELLED
Start: 2023-10-31 | End: 2023-10-31

## 2023-10-17 RX ORDER — ALBUTEROL SULFATE 0.83 MG/ML
3 SOLUTION RESPIRATORY (INHALATION) AS NEEDED
Status: CANCELLED | OUTPATIENT
Start: 2023-10-31

## 2023-10-17 RX ORDER — PROCHLORPERAZINE EDISYLATE 5 MG/ML
10 INJECTION INTRAMUSCULAR; INTRAVENOUS EVERY 6 HOURS PRN
Status: CANCELLED | OUTPATIENT
Start: 2023-10-31

## 2023-10-17 RX ORDER — PROCHLORPERAZINE MALEATE 5 MG
10 TABLET ORAL EVERY 6 HOURS PRN
Status: CANCELLED | OUTPATIENT
Start: 2023-10-24

## 2023-10-17 RX ORDER — PROCHLORPERAZINE EDISYLATE 5 MG/ML
10 INJECTION INTRAMUSCULAR; INTRAVENOUS EVERY 6 HOURS PRN
Status: DISCONTINUED | OUTPATIENT
Start: 2023-10-17 | End: 2023-10-17 | Stop reason: HOSPADM

## 2023-10-17 RX ORDER — FAMOTIDINE 10 MG/ML
20 INJECTION INTRAVENOUS ONCE AS NEEDED
Status: DISCONTINUED | OUTPATIENT
Start: 2023-10-17 | End: 2023-10-17 | Stop reason: HOSPADM

## 2023-10-17 RX ORDER — EPINEPHRINE 0.3 MG/.3ML
0.3 INJECTION SUBCUTANEOUS EVERY 5 MIN PRN
Status: DISCONTINUED | OUTPATIENT
Start: 2023-10-17 | End: 2023-10-17 | Stop reason: HOSPADM

## 2023-10-17 RX ORDER — FAMOTIDINE 10 MG/ML
20 INJECTION INTRAVENOUS ONCE
Status: CANCELLED | OUTPATIENT
Start: 2023-11-07

## 2023-10-17 RX ORDER — DEXAMETHASONE SODIUM PHOSPHATE 100 MG/10ML
10 INJECTION INTRAMUSCULAR; INTRAVENOUS ONCE
Status: CANCELLED | OUTPATIENT
Start: 2023-10-31

## 2023-10-17 RX ORDER — ONDANSETRON 8 MG/1
8 TABLET, ORALLY DISINTEGRATING ORAL EVERY 8 HOURS PRN
Qty: 20 TABLET | Refills: 1 | Status: SHIPPED | OUTPATIENT
Start: 2023-10-17 | End: 2023-11-16

## 2023-10-17 RX ORDER — PROCHLORPERAZINE MALEATE 5 MG
10 TABLET ORAL EVERY 6 HOURS PRN
Status: CANCELLED | OUTPATIENT
Start: 2023-11-07

## 2023-10-17 RX ORDER — PROCHLORPERAZINE EDISYLATE 5 MG/ML
10 INJECTION INTRAMUSCULAR; INTRAVENOUS EVERY 6 HOURS PRN
Status: CANCELLED | OUTPATIENT
Start: 2023-10-24

## 2023-10-17 RX ORDER — EPINEPHRINE 0.3 MG/.3ML
0.3 INJECTION SUBCUTANEOUS EVERY 5 MIN PRN
Status: CANCELLED | OUTPATIENT
Start: 2023-10-31

## 2023-10-17 RX ORDER — ONDANSETRON 8 MG/1
8 TABLET, ORALLY DISINTEGRATING ORAL EVERY 8 HOURS PRN
Qty: 20 TABLET | Refills: 3 | Status: SHIPPED | OUTPATIENT
Start: 2023-10-17 | End: 2023-10-17

## 2023-10-17 RX ORDER — DIPHENHYDRAMINE HYDROCHLORIDE 50 MG/ML
50 INJECTION INTRAMUSCULAR; INTRAVENOUS ONCE
Status: COMPLETED | OUTPATIENT
Start: 2023-10-17 | End: 2023-10-17

## 2023-10-17 RX ORDER — DIPHENHYDRAMINE HYDROCHLORIDE 50 MG/ML
50 INJECTION INTRAMUSCULAR; INTRAVENOUS AS NEEDED
Status: DISCONTINUED | OUTPATIENT
Start: 2023-10-17 | End: 2023-10-17 | Stop reason: HOSPADM

## 2023-10-17 RX ORDER — FAMOTIDINE 10 MG/ML
20 INJECTION INTRAVENOUS ONCE
Status: CANCELLED | OUTPATIENT
Start: 2023-11-14

## 2023-10-17 RX ORDER — DEXAMETHASONE SODIUM PHOSPHATE 100 MG/10ML
10 INJECTION INTRAMUSCULAR; INTRAVENOUS ONCE
Status: CANCELLED | OUTPATIENT
Start: 2023-10-24

## 2023-10-17 RX ORDER — DEXAMETHASONE SODIUM PHOSPHATE 4 MG/ML
10 INJECTION, SOLUTION INTRA-ARTICULAR; INTRALESIONAL; INTRAMUSCULAR; INTRAVENOUS; SOFT TISSUE ONCE
Status: COMPLETED | OUTPATIENT
Start: 2023-10-17 | End: 2023-10-17

## 2023-10-17 RX ORDER — DIPHENHYDRAMINE HYDROCHLORIDE 50 MG/ML
50 INJECTION INTRAMUSCULAR; INTRAVENOUS ONCE
Status: CANCELLED
Start: 2023-10-24 | End: 2023-10-24

## 2023-10-17 RX ORDER — DEXAMETHASONE SODIUM PHOSPHATE 100 MG/10ML
10 INJECTION INTRAMUSCULAR; INTRAVENOUS ONCE
Status: CANCELLED | OUTPATIENT
Start: 2023-11-14

## 2023-10-17 RX ORDER — FAMOTIDINE 10 MG/ML
20 INJECTION INTRAVENOUS ONCE
Status: CANCELLED | OUTPATIENT
Start: 2023-10-31

## 2023-10-17 RX ORDER — EPINEPHRINE 0.3 MG/.3ML
0.3 INJECTION SUBCUTANEOUS EVERY 5 MIN PRN
Status: CANCELLED | OUTPATIENT
Start: 2023-11-07

## 2023-10-17 RX ORDER — ALBUTEROL SULFATE 0.83 MG/ML
3 SOLUTION RESPIRATORY (INHALATION) AS NEEDED
Status: CANCELLED | OUTPATIENT
Start: 2023-11-07

## 2023-10-17 RX ORDER — FAMOTIDINE 10 MG/ML
20 INJECTION INTRAVENOUS ONCE AS NEEDED
Status: CANCELLED | OUTPATIENT
Start: 2023-10-31

## 2023-10-17 RX ORDER — FAMOTIDINE 10 MG/ML
20 INJECTION INTRAVENOUS ONCE AS NEEDED
Status: CANCELLED | OUTPATIENT
Start: 2023-11-14

## 2023-10-17 RX ORDER — PROCHLORPERAZINE MALEATE 5 MG
10 TABLET ORAL EVERY 6 HOURS PRN
Status: CANCELLED | OUTPATIENT
Start: 2023-11-14

## 2023-10-17 RX ORDER — ALBUTEROL SULFATE 0.83 MG/ML
3 SOLUTION RESPIRATORY (INHALATION) AS NEEDED
Status: CANCELLED | OUTPATIENT
Start: 2023-11-14

## 2023-10-17 RX ORDER — EPINEPHRINE 0.3 MG/.3ML
0.3 INJECTION SUBCUTANEOUS EVERY 5 MIN PRN
Status: CANCELLED | OUTPATIENT
Start: 2023-11-14

## 2023-10-17 RX ORDER — DIPHENHYDRAMINE HYDROCHLORIDE 50 MG/ML
50 INJECTION INTRAMUSCULAR; INTRAVENOUS ONCE
Status: CANCELLED
Start: 2023-11-07 | End: 2023-11-07

## 2023-10-17 RX ORDER — EPINEPHRINE 0.3 MG/.3ML
0.3 INJECTION SUBCUTANEOUS EVERY 5 MIN PRN
Status: CANCELLED | OUTPATIENT
Start: 2023-10-24

## 2023-10-17 RX ORDER — FAMOTIDINE 10 MG/ML
20 INJECTION INTRAVENOUS ONCE AS NEEDED
Status: CANCELLED | OUTPATIENT
Start: 2023-11-07

## 2023-10-17 RX ORDER — DIPHENHYDRAMINE HYDROCHLORIDE 50 MG/ML
50 INJECTION INTRAMUSCULAR; INTRAVENOUS AS NEEDED
Status: CANCELLED | OUTPATIENT
Start: 2023-11-07

## 2023-10-17 RX ORDER — ALBUTEROL SULFATE 0.83 MG/ML
3 SOLUTION RESPIRATORY (INHALATION) AS NEEDED
Status: CANCELLED | OUTPATIENT
Start: 2023-10-24

## 2023-10-17 RX ORDER — DIPHENHYDRAMINE HYDROCHLORIDE 50 MG/ML
50 INJECTION INTRAMUSCULAR; INTRAVENOUS AS NEEDED
Status: CANCELLED | OUTPATIENT
Start: 2023-10-31

## 2023-10-17 RX ORDER — PROCHLORPERAZINE EDISYLATE 5 MG/ML
10 INJECTION INTRAMUSCULAR; INTRAVENOUS EVERY 6 HOURS PRN
Status: CANCELLED | OUTPATIENT
Start: 2023-11-07

## 2023-10-17 RX ORDER — PROCHLORPERAZINE MALEATE 10 MG
10 TABLET ORAL EVERY 6 HOURS PRN
Status: DISCONTINUED | OUTPATIENT
Start: 2023-10-17 | End: 2023-10-17 | Stop reason: HOSPADM

## 2023-10-17 RX ORDER — DIPHENHYDRAMINE HYDROCHLORIDE 50 MG/ML
50 INJECTION INTRAMUSCULAR; INTRAVENOUS ONCE
Status: CANCELLED
Start: 2023-11-14 | End: 2023-11-14

## 2023-10-17 RX ORDER — PROCHLORPERAZINE EDISYLATE 5 MG/ML
10 INJECTION INTRAMUSCULAR; INTRAVENOUS EVERY 6 HOURS PRN
Status: CANCELLED | OUTPATIENT
Start: 2023-11-14

## 2023-10-17 RX ORDER — ONDANSETRON HYDROCHLORIDE 8 MG/1
8 TABLET, FILM COATED ORAL EVERY 8 HOURS PRN
Qty: 30 TABLET | Refills: 1 | Status: SHIPPED | OUTPATIENT
Start: 2023-10-17 | End: 2023-10-17 | Stop reason: SINTOL

## 2023-10-17 RX ORDER — DEXAMETHASONE SODIUM PHOSPHATE 100 MG/10ML
10 INJECTION INTRAMUSCULAR; INTRAVENOUS ONCE
Status: CANCELLED | OUTPATIENT
Start: 2023-11-07

## 2023-10-17 RX ORDER — FAMOTIDINE 10 MG/ML
20 INJECTION INTRAVENOUS ONCE AS NEEDED
Status: CANCELLED | OUTPATIENT
Start: 2023-10-24

## 2023-10-17 RX ORDER — DIPHENHYDRAMINE HYDROCHLORIDE 50 MG/ML
50 INJECTION INTRAMUSCULAR; INTRAVENOUS AS NEEDED
Status: CANCELLED | OUTPATIENT
Start: 2023-10-24

## 2023-10-17 RX ORDER — FAMOTIDINE 10 MG/ML
20 INJECTION INTRAVENOUS ONCE
Status: CANCELLED | OUTPATIENT
Start: 2023-10-24

## 2023-10-17 RX ORDER — DIPHENHYDRAMINE HYDROCHLORIDE 50 MG/ML
50 INJECTION INTRAMUSCULAR; INTRAVENOUS AS NEEDED
Status: CANCELLED | OUTPATIENT
Start: 2023-11-14

## 2023-10-17 RX ADMIN — FAMOTIDINE 20 MG: 10 INJECTION INTRAVENOUS at 10:31

## 2023-10-17 RX ADMIN — DEXAMETHASONE SODIUM PHOSPHATE 10 MG: 4 INJECTION, SOLUTION INTRA-ARTICULAR; INTRALESIONAL; INTRAMUSCULAR; INTRAVENOUS; SOFT TISSUE at 10:31

## 2023-10-17 RX ADMIN — DIPHENHYDRAMINE HYDROCHLORIDE 50 MG: 50 INJECTION INTRAMUSCULAR; INTRAVENOUS at 10:48

## 2023-10-17 RX ADMIN — TRASTUZUMAB-ANNS 222 MG: 150 INJECTION, POWDER, LYOPHILIZED, FOR SOLUTION INTRAVENOUS at 11:17

## 2023-10-17 RX ADMIN — PACLITAXEL 90 MG: 6 INJECTION, SOLUTION INTRAVENOUS at 13:05

## 2023-10-17 ASSESSMENT — PATIENT HEALTH QUESTIONNAIRE - PHQ9
4. FEELING TIRED OR HAVING LITTLE ENERGY: 0
SUM OF ALL RESPONSES TO PHQ QUESTIONS 1-9: 3
5. POOR APPETITE OR OVEREATING: NOT AT ALL
2. FEELING DOWN, DEPRESSED, IRRITABLE, OR HOPELESS: SEVERAL DAYS
8. MOVING OR SPEAKING SO SLOWLY THAT OTHER PEOPLE COULD HAVE NOTICED. OR THE OPPOSITE, BEING SO FIGETY OR RESTLESS THAT YOU HAVE BEEN MOVING AROUND A LOT MORE THAN USUAL: NOT AT ALL
4. FEELING TIRED OR HAVING LITTLE ENERGY: 0
3. TROUBLE FALLING OR STAYING ASLEEP OR SLEEPING TOO MUCH: 0
4. FEELING TIRED OR HAVING LITTLE ENERGY: NOT AT ALL
7. TROUBLE CONCENTRATING ON THINGS, SUCH AS READING THE NEWSPAPER OR WATCHING TELEVISION: SEVERAL DAYS
6. FEELING BAD ABOUT YOURSELF - OR THAT YOU ARE A FAILURE OR HAVE LET YOURSELF OR YOUR FAMILY DOWN: NOT AT ALL
2. FEELING DOWN, DEPRESSED OR HOPELESS: SEVERAL DAYS
7. TROUBLE CONCENTRATING ON THINGS, SUCH AS READING THE NEWSPAPER OR WATCHING TELEVISION: SEVERAL DAYS
7. TROUBLE CONCENTRATING ON THINGS, SUCH AS READING THE NEWSPAPER OR WATCHING TELEVISION: SEVERAL DAYS
6. FEELING BAD ABOUT YOURSELF - OR THAT YOU ARE A FAILURE OR HAVE LET YOURSELF OR YOUR FAMILY DOWN: 0
6. FEELING BAD ABOUT YOURSELF - OR THAT YOU ARE A FAILURE OR HAVE LET YOURSELF OR YOUR FAMILY DOWN: NOT AT ALL
3. TROUBLE FALLING OR STAYING ASLEEP OR SLEEPING TOO MUCH: NOT AT ALL
10. IF YOU CHECKED OFF ANY PROBLEMS, HOW DIFFICULT HAVE THESE PROBLEMS MADE IT FOR YOU TO DO YOUR WORK, TAKE CARE OF THINGS AT HOME, OR GET ALONG WITH OTHER PEOPLE: NOT DIFFICULT AT ALL
8. MOVING OR SPEAKING SO SLOWLY THAT OTHER PEOPLE COULD HAVE NOTICED. OR THE OPPOSITE, BEING SO FIGETY OR RESTLESS THAT YOU HAVE BEEN MOVING AROUND A LOT MORE THAN USUAL: 0
1. LITTLE INTEREST OR PLEASURE IN DOING THINGS: SEVERAL DAYS
9. THOUGHTS THAT YOU WOULD BE BETTER OFF DEAD, OR OF HURTING YOURSELF: NOT AT ALL
7. TROUBLE CONCENTRATING ON THINGS, SUCH AS READING THE NEWSPAPER OR WATCHING TELEVISION: SEVERAL DAYS
5. POOR APPETITE OR OVEREATING: NOT AT ALL
5. POOR APPETITE OR OVEREATING: NOT AT ALL
8. MOVING OR SPEAKING SO SLOWLY THAT OTHER PEOPLE COULD HAVE NOTICED. OR THE OPPOSITE, BEING SO FIGETY OR RESTLESS THAT YOU HAVE BEEN MOVING AROUND A LOT MORE THAN USUAL: 0
5. POOR APPETITE OR OVEREATING: 0
9. THOUGHTS THAT YOU WOULD BE BETTER OFF DEAD, OR OF HURTING YOURSELF: NOT AT ALL
3. TROUBLE FALLING OR STAYING ASLEEP OR SLEEPING TOO MUCH: NOT AT ALL
SUM OF ALL RESPONSES TO PHQ QUESTIONS 1-9: 3
4. FEELING TIRED OR HAVING LITTLE ENERGY: NOT AT ALL
9. THOUGHTS THAT YOU WOULD BE BETTER OFF DEAD, OR OF HURTING YOURSELF: NOT AT ALL
4. FEELING TIRED OR HAVING LITTLE ENERGY: NOT AT ALL
6. FEELING BAD ABOUT YOURSELF - OR THAT YOU ARE A FAILURE OR HAVE LET YOURSELF OR YOUR FAMILY DOWN: NOT AT ALL
10. IF YOU CHECKED OFF ANY PROBLEMS, HOW DIFFICULT HAVE THESE PROBLEMS MADE IT FOR YOU TO DO YOUR WORK, TAKE CARE OF THINGS AT HOME, OR GET ALONG WITH OTHER PEOPLE: NOT DIFFICULT AT ALL
9. THOUGHTS THAT YOU WOULD BE BETTER OFF DEAD, OR OF HURTING YOURSELF: NOT AT ALL
2. FEELING DOWN, DEPRESSED, IRRITABLE, OR HOPELESS: 1
8. MOVING OR SPEAKING SO SLOWLY THAT OTHER PEOPLE COULD HAVE NOTICED. OR THE OPPOSITE, BEING SO FIGETY OR RESTLESS THAT YOU HAVE BEEN MOVING AROUND A LOT MORE THAN USUAL: NOT AT ALL
9. THOUGHTS THAT YOU WOULD BE BETTER OFF DEAD, OR OF HURTING YOURSELF: 0
1. LITTLE INTEREST OR PLEASURE IN DOING THINGS: SEVERAL DAYS
6. FEELING BAD ABOUT YOURSELF - OR THAT YOU ARE A FAILURE OR HAVE LET YOURSELF OR YOUR FAMILY DOWN: NOT AT ALL
1. LITTLE INTEREST OR PLEASURE IN DOING THINGS: 1
9. THOUGHTS THAT YOU WOULD BE BETTER OFF DEAD, OR OF HURTING YOURSELF: 0
5. POOR APPETITE OR OVEREATING: NOT AT ALL
3. TROUBLE FALLING OR STAYING ASLEEP OR SLEEPING TOO MUCH: 0
1. LITTLE INTEREST OR PLEASURE IN DOING THINGS: SEVERAL DAYS
7. TROUBLE CONCENTRATING ON THINGS, SUCH AS READING THE NEWSPAPER OR WATCHING TELEVISION: 1
10. IF YOU CHECKED OFF ANY PROBLEMS, HOW DIFFICULT HAVE THESE PROBLEMS MADE IT FOR YOU TO DO YOUR WORK, TAKE CARE OF THINGS AT HOME, OR GET ALONG WITH OTHER PEOPLE: NOT DIFFICULT AT ALL
7. TROUBLE CONCENTRATING ON THINGS, SUCH AS READING THE NEWSPAPER OR WATCHING TELEVISION: 1
8. MOVING OR SPEAKING SO SLOWLY THAT OTHER PEOPLE COULD HAVE NOTICED. OR THE OPPOSITE, BEING SO FIGETY OR RESTLESS THAT YOU HAVE BEEN MOVING AROUND A LOT MORE THAN USUAL: NOT AT ALL
SUM OF ALL RESPONSES TO PHQ QUESTIONS 1-9: 3
2. FEELING DOWN, DEPRESSED OR HOPELESS: SEVERAL DAYS
1. LITTLE INTEREST OR PLEASURE IN DOING THINGS: 1
5. POOR APPETITE OR OVEREATING: 0
SUM OF ALL RESPONSES TO PHQ QUESTIONS 1-9: 3
10. IF YOU CHECKED OFF ANY PROBLEMS, HOW DIFFICULT HAVE THESE PROBLEMS MADE IT FOR YOU TO DO YOUR WORK, TAKE CARE OF THINGS AT HOME, OR GET ALONG WITH OTHER PEOPLE: NOT DIFFICULT AT ALL
2. FEELING DOWN, DEPRESSED, IRRITABLE, OR HOPELESS: SEVERAL DAYS
3. TROUBLE FALLING OR STAYING ASLEEP OR SLEEPING TOO MUCH: NOT AT ALL
6. FEELING BAD ABOUT YOURSELF - OR THAT YOU ARE A FAILURE OR HAVE LET YOURSELF OR YOUR FAMILY DOWN: 0
8. MOVING OR SPEAKING SO SLOWLY THAT OTHER PEOPLE COULD HAVE NOTICED. OR THE OPPOSITE, BEING SO FIGETY OR RESTLESS THAT YOU HAVE BEEN MOVING AROUND A LOT MORE THAN USUAL: NOT AT ALL
1. LITTLE INTEREST OR PLEASURE IN DOING THINGS: SEVERAL DAYS
3. TROUBLE FALLING OR STAYING ASLEEP OR SLEEPING TOO MUCH: NOT AT ALL
2. FEELING DOWN, DEPRESSED, IRRITABLE, OR HOPELESS: 1
4. FEELING TIRED OR HAVING LITTLE ENERGY: NOT AT ALL

## 2023-10-17 ASSESSMENT — ENCOUNTER SYMPTOMS
DEPRESSION: 0
OCCASIONAL FEELINGS OF UNSTEADINESS: 0
LOSS OF SENSATION IN FEET: 0

## 2023-10-17 ASSESSMENT — PAIN SCALES - GENERAL: PAINLEVEL: 0-NO PAIN

## 2023-10-17 NOTE — SIGNIFICANT EVENT
10/17/23 0951   Prechemo Checklist   Has the patient been in the hospital, ED, or urgent care since last date of service No   Chemo/Immuno Consent Signed Yes   Protocol/Indications Verified Yes   Confirmed to previous date/time of medication Yes   Compared to previous dose N/A   All medications are dated accurately Yes   Pregnancy Test Negative Not applicable   Parameters Met Yes   BSA/Weight-Height Verified Yes   Dose Calculations Verified Yes

## 2023-10-17 NOTE — PROGRESS NOTES
Patient ID: Zina Hernandez is a 68 y.o. female.    The patient presents to clinic today for her history of breast cancer.    Cancer Staging   No matching staging information was found for the patient.      Diagnostic/Therapeutic History:    The patient presents to clinic today for her history of breast cancer.     Diagnostic/Therapeutic History:  Cancer History:          Breast         AJCC Edition: 8th (AJCC), Diagnosis Date: 30-Aug-2023, IA, pT1c pN0 cM0 G2     Treatment Synopsis:    - On 6/14/2023 screening mammogram she had 2 adjacent irregular spiculated masses in the upper outer left breast at middle depth.  No suspicious masses or calcification  in the right breast.  BI-RADS Category 0     -On 6/28/2023 she had targeted ultrasound that showed at 1:00, 4 cm from the nipple an irregular hypoechoic mass measuring 3 x 5 x 4 mm this corresponds to the mammographic finding.  Left axilla showed 4 morphologically normal lymph nodes without lymphadenopathy      -On 7/14/2023 she had left breast mass ultrasound guided core needle  invasive lobular carcinoma, grade 2 with  ER 95%, WV 20%, HER2 positive 3+, also had lobular carcinoma in situ     -On 8/30/2023 Dr. Blanchard performed left partial mastectomy with sentinel lymph node biopsy (0/1) 2 foci of cancer largest 1 measuring 13 mm, second 1 measuring 7 mm, margins were negative final stage [id number]     History of Present Illness (HPI)/Interval History:      Patient overall doing fine, recovering well from surgery, overall overwhelmed about her recent diagnosis of breast cancer.  .  No headaches  no blurred vision, no chest pain or shortness of breath.  No abdominal pain no nausea no vomiting.  Appetite okay weight is maintained. All questions have been answered today.     14 point review of system otherwise unremarkable     PMH: as above     Meds: allergies reviewed      Reproductive history: Menarche at 13, AFLB: 27,  menopause at 50, no HRT     Family history  "maternal grandmother with uterine cancer in her 60s    She denies any fevers or chills.      Review of Systems:  14-point ROS otherwise negative, as per HPI.    Past Medical History:   Diagnosis Date    Cancer (CMS/HCC)     Migraine        Past Surgical History:   Procedure Laterality Date    BREAST LUMPECTOMY         Social History     Socioeconomic History    Marital status:      Spouse name: None    Number of children: None    Years of education: None    Highest education level: None   Occupational History    None   Tobacco Use    Smoking status: Never    Smokeless tobacco: Never   Substance and Sexual Activity    Alcohol use: Never    Drug use: Never    Sexual activity: None   Other Topics Concern    None   Social History Narrative    None     Social Determinants of Health     Financial Resource Strain: Not on file   Food Insecurity: Not on file   Transportation Needs: Not on file   Physical Activity: Not on file   Stress: Not on file   Social Connections: Not on file   Intimate Partner Violence: Not on file   Housing Stability: Not on file       Allergies   Allergen Reactions    Tamiflu [Oseltamivir] Unknown         Current Outpatient Medications:     ondansetron (Zofran) 8 mg tablet, Take 1 tablet (8 mg) by mouth every 8 hours if needed for nausea or vomiting., Disp: 30 tablet, Rfl: 1  No current facility-administered medications for this visit.    Facility-Administered Medications Ordered in Other Visits:     sodium chloride 0.9% infusion, 50 mL/hr, intravenous, Continuous, Yohan Romo MD     Objective    BSA: 1.52 meters squared  /83 (BP Location: Left arm, Patient Position: Sitting)   Pulse 87   Temp 36.5 °C (97.7 °F) (Temporal)   Resp 16   Ht (S) 1.511 m (4' 11.49\")   Wt 55.2 kg (121 lb 11.1 oz)   BMI 24.18 kg/m²     ECOG Performance Status: 0    Physical Exam    Constitutional: Well developed, awake/alert/oriented  x3, no distress, alert and cooperative   Eyes: PERRL, EOMI, " clear sclera   ENMT: mucous membranes moist, no apparent injury,  no lesions seen   Head/Neck: Neck supple, no apparent injury, thyroid  without mass or tenderness, No JVD, trachea midline, no bruits   Respiratory/Thorax: Patent airways, CTAB, normal  breath sounds with good chest expansion, thorax symmetric   Cardiovascular: Regular, rate and rhythm, no murmurs,  2+ equal pulses of the extremities, normal S 1and S 2   Gastrointestinal: Nondistended, soft, non-tender,  no rebound tenderness or guarding, no masses palpable, no organomegaly, +BS, no bruits   Extremities: normal extremities, no cyanosis edema,  contusions or wounds, no clubbing   Breast: Left surgical incision healing well, +seroma,  no new nodules or lymphadenopathy.  No right breast nodules or lymphadenopathy        Laboratory Data:  Lab Results   Component Value Date    WBC 4.2 (L) 10/11/2023    HGB 12.9 10/11/2023    HCT 40.3 10/11/2023    MCV 86 10/11/2023     10/11/2023       Chemistry    Lab Results   Component Value Date/Time     10/09/2023 1655    K 4.2 10/09/2023 1655     10/09/2023 1655    CO2 31 10/09/2023 1655    BUN 10 10/09/2023 1655    CREATININE 1.03 10/09/2023 1655    Lab Results   Component Value Date/Time    CALCIUM 9.2 10/09/2023 1655    ALKPHOS 54 10/09/2023 1655    AST 19 10/09/2023 1655    ALT 12 10/09/2023 1655    BILITOT 0.3 10/09/2023 1655             Radiology:  Onco-Echo Complete (Strain & 3D)      [facility], [address] [facility] [id number] [phone] and                                  Fax [phone]    TRANSTHORACIC ECHOCARDIOGRAM REPORT       Patient Name:      HUNTER Neri Physician:    [id number] John Garcia DO  Study Date:        10/13/2023           Ordering Provider:    [id number] WOODS  MRN/PID:           [id number]              Fellow:  Accession#:        [id number]         Nurse:  Date of Birth/Age: 1955 / 68 years Sonographer:          Lindsay Marshall RDCS, RCS  Gender:            F                    Additional Staff:  Height:            149.86 cm            Admit Date:           10/13/2023  Weight:            55.34 kg             Admission Status:     Outpatient  BSA:               1.49 m2              Encounter#:           [id number]                                          Department Location:  [facility]  Blood Pressure: 120 /83 mmHg    Study Type:    ONCO-ECHO COMPLETE (STRAIN AND 3D)  Diagnosis/ICD: Other long term (current) drug therapy-Z79.899; Encounter for                 monitoring cardiotoxic drug therapy-Z51.81; Malignant neoplasm of                 unspecified site of left female breast-C50.912  Indication:    Cardiotoxic chemo, breast cancer (L)  CPT Code:      Echo Complete w Full Doppler-66450; Myocardial Strain                 Imaging-50941   Study Detail: The following Echo studies were performed: 2D, M-Mode, Doppler,                color flow and Strain.       PHYSICIAN INTERPRETATION:  Left Ventricle: The left ventricular systolic function is normal, with an estimated ejection fraction of 65-70%. There are no regional wall motion abnormalities. The left ventricular cavity size is normal. Spectral Doppler shows a normal pattern of left ventricular diastolic filling. Strain values are normal, which imply normal myocardial function.  Left Atrium: The left atrium is normal in size.  Right Ventricle: The right ventricle is normal in size. There is normal right ventricular global systolic function.  Right Atrium: The right atrium is normal in size.  Aortic Valve: The aortic valve is trileaflet. There is mild aortic valve thickening. There is trivial aortic valve regurgitation. The peak instantaneous gradient of the aortic valve is 5.6 mmHg. The mean  gradient of the aortic valve is 2.7 mmHg.  Mitral Valve: The mitral valve is mildly thickened. There is mild mitral valve regurgitation.  Tricuspid Valve: The tricuspid valve is structurally normal. There is trace to mild tricuspid regurgitation. The Doppler estimated RVSP is within normal limits at 18.5 mmHg.  Pulmonic Valve: The pulmonic valve is structurally normal. There is mild pulmonic valve regurgitation.  Pericardium: There is no pericardial effusion noted.  Aorta: The aortic root is normal.  Pulmonary Artery: The main pulmonary artery is normal in size, and position, with normal bifurcation into the left and right pulmonary arteries.  Systemic Veins: The inferior vena cava size appears small.     Onco-Cardiology Measurements:  Current Measurements  2D EF (Biplane)                     67%  Global Longitudinal Strain (GLS) -20.7%    GLS Tracking Quality: Fair       CONCLUSIONS:   1. Left ventricular systolic function is normal with a 65-70% estimated ejection fraction.   2. RVSP within normal limits.   3. Strain values are normal, which imply normal myocardial function.    QUANTITATIVE DATA SUMMARY:  2D MEASUREMENTS:                           Normal Ranges:  LAs:           3.06 cm   (2.7-4.0cm)  RVIDd:         2.64 cm   (0.9-3.6cm)  IVSd:          0.93 cm   (0.6-1.1cm)  LVPWd:         0.79 cm   (0.6-1.1cm)  LVIDd:         3.82 cm   (3.9-5.9cm)  LVIDs:         2.51 cm  LV Mass Index: 63.9 g/m2  LV % FS        34.3 %    LA VOLUME:                              Normal Ranges:  LA Area A4C:     14.4 cm2  LA Area A2C:     14.3 cm2  LA Volume Index: 27.1 ml/m2  LA Vol A4C:      39.1 ml  LA Vol A2C:      32.1 ml    RA VOLUME BY A/L METHOD:                        Normal Ranges:  RA Area A4C: 11.9 cm2    M-MODE MEASUREMENTS:                   Normal Ranges:  Ao Root: 3.40 cm (2.0-3.7cm)  AoV Exc: 1.86 cm (1.5-2.5cm)    AORTA MEASUREMENTS:                     Normal Ranges:  AoV Exc:   1.86 cm (1.5-2.5cm)  Asc Ao, d:  3.00 cm (2.1-3.4cm)    LV SYSTOLIC FUNCTION BY 2D PLANIMETRY (MOD):                      Normal Ranges:  EF-A4C View: 66.2 % (>=55%)  EF-A2C View: 65.6 %  EF-Biplane:  67.0 %    LV DIASTOLIC FUNCTION:                         Normal Ranges:  MV Peak E:    0.93 m/s (0.7-1.2 m/s)  MV Peak A:    0.75 m/s (0.42-0.7 m/s)  E/A Ratio:    1.24     (1.0-2.2)  MV lateral e' 0.11 m/s  MV medial e'  0.10 m/s    MITRAL VALVE:                  Normal Ranges:  MV DT: 197 msec (150-240msec)    AORTIC VALVE:                                    Normal Ranges:  AoV Vmax:                1.18 m/s (<=1.7m/s)  AoV Peak P.6 mmHg (<20mmHg)  AoV Mean P.7 mmHg (1.7-11.5mmHg)  LVOT Max Steve:            0.83 m/s (<=1.1m/s)  AoV VTI:                 23.52 cm (18-25cm)  LVOT VTI:                16.63 cm  LVOT Diameter:           1.95 cm  (1.8-2.4cm)  AoV Area, VTI:           2.12 cm2 (2.5-5.5cm2)  AoV Area,Vmax:           2.11 cm2 (2.5-4.5cm2)  AoV Dimensionless Index: 0.71       RIGHT VENTRICLE:  RV Basal 3.00 cm  RV Mid   2.60 cm  RV Major 5.3 cm  TAPSE:   17.0 mm  RV s'    0.13 m/s    TRICUSPID VALVE/RVSP:                              Normal Ranges:  Peak TR Velocity: 1.97 m/s  RV Syst Pressure: 18.5 mmHg (< 30mmHg)  IVC Diam:         1.10 cm    AORTA:  Asc Ao Diam 3.00 cm       [id number] John Garcia DO  Electronically signed on 10/13/2023 at 5:05:31 PM       ** Final **       BI mammo bilateral screening tomosynthesis 2023    Narrative  Interpreted By:  ENZO MANUEL MD  MRN: [id number]  Patient Name: SPARINO, HUNTER    STUDY:  DIGITAL MAMM SCREENING W/ RUBI;  2023 8:00 am    ACCESSION NUMBER(S):  [id number]    ORDERING CLINICIAN:  KATIE HINOJOSA    INDICATION:  Screening.    COMPARISON:  2021, 2020, 2019    FINDINGS:  2D and tomosynthesis images were reviewed at 1 mm slice thickness.    There are areas of scattered fibroglandular tissue.  There are 2  adjacent irregular  spiculated equal density masses in the upper outer  left breast at middle depth. No suspicious masses or calcifications  are identified in the right breast.    Impression  No mammographic evidence of malignancy in the right breast. Left  breast mass x2.    BI-RADS CATEGORY:    Category: 0 - Incomplete; Need Additional Imaging Evaluation and/or  Prior Mammograms for Comparison.  Recommendation: Ultrasound Recommended.    For any future breast imaging appointments, please call [phone]  [phone]).    Patient letter sent SADEVAL          Assessment/Plan:     67-year-old female previously healthy found to have on screening mammogram 2 adjacent irregular spiculated masses in the upper outer left breast at middle depth with  US showing a 5mm mass with left axilla showing 4 negative LN s/p  guided core needle  invasive lobular carcinoma, grade 2 with  ER 95%, [place] 20%, HER2 positive 3+, also had lobular carcinoma in situ. Dr. Blanchard performed left partial mastectomy with sentinel  lymph node biopsy (0/1) 2 foci of cancer largest 1 measuring 13 mm, second 1 measuring 7 mm, margins were negative final stage PT1CN0. She is presenting to discuss adjuvant treatment options.      I reviewed with her the events that led to her diagnosis of breast cancer. We reviewed all the procedures and diagnostic imaging she underwent thus far. I discussed the features of her breast cancer that include the size, grade, lymph node status and  hormone receptor/ her2-ying status.     Based on APT trial recommended to proceed with THx12 weeks followed by radiation followed by hormonal therapy and herceptin to complete for a total of 1 year     Based on the updated 10 year results of the APT trial published in the lancet in March of this year:      410 patients were enrolled and 406 were given adjuvant paclitaxel and trastuzumab and included in the analysis. Mean age at enrolment was 55 years (SD 10·5), 405 (99·8%) of 406 patients were female and  one (0·2%) was male, 350 (86·2%) were  White, 28 (6·9%) were Black or , and 272 (67·0%) had hormone receptor-positive disease. After a median follow-up of 10·8 years (IQR 7·1-11·4), among 406 patients included in the analysis population, we observed 31  invasive disease-free survival events, of which six (19·4%) were locoregional ipsilateral recurrences, nine (29·0%) were new contralateral breast cancers, six (19·4%) were distant recurrences, and ten (32·3%) were all-cause deaths.  10-year invasive disease-free survival was 91·3% (95% CI 88·3-94·4), 10-year recurrence-free interval was 96·3% (95% CI 94·3-98·3), 10-year overall survival was 94·3% (95% CI 91·8-96·8), and 10-year breast  cancer-specific survival was 98·8% (95% CI 97·6-100)       Disposition.  - Echocardiogram looked good  - plan to start treatment today (consented)  - THRIVE 65 study to be discussed with patient and possibly consent today by Lexy Amin MD  Hematology and Medical Oncology  [facility]

## 2023-10-18 ENCOUNTER — NURSE TRIAGE (OUTPATIENT)
Dept: ADMISSION | Facility: HOSPITAL | Age: 68
End: 2023-10-18
Payer: MEDICARE

## 2023-10-18 ENCOUNTER — TELEPHONE (OUTPATIENT)
Dept: HEMATOLOGY/ONCOLOGY | Facility: CLINIC | Age: 68
End: 2023-10-18
Payer: MEDICARE

## 2023-10-18 RX ORDER — DEXAMETHASONE 0.5 MG/5ML
4 ELIXIR ORAL 2 TIMES DAILY
Qty: 800 ML | Refills: 0 | Status: SHIPPED | OUTPATIENT
Start: 2023-10-18 | End: 2023-11-21 | Stop reason: SDUPTHER

## 2023-10-18 RX ORDER — LOPERAMIDE HCL 1MG/7.5ML
2 LIQUID (ML) ORAL 4 TIMES DAILY PRN
Qty: 150 ML | Refills: 0 | Status: SHIPPED | OUTPATIENT
Start: 2023-10-18 | End: 2023-10-31

## 2023-10-18 NOTE — TELEPHONE ENCOUNTER
Called Zina:     She is having grade 1 diarrhea: encouraged imodium liquid PRN as needed    Does mentions redness around cheeks and face: prescribed 2 days of dexamethasone 4mg bid liquid

## 2023-10-18 NOTE — TELEPHONE ENCOUNTER
Patient received treatment yesterday. Woke with redness to cheeks and nose; nose has resolved however cheeks are still reported as red and warm to the touch. Patient also reports having 3 runny bowel movements. Denies fever, reports mild nausea. No complaints or difficulty with urination.   Patient wanted to report symptoms and inquire if she can take imodium/alternative medication for the diarrhea  Additional Information   Commented on: Review EMR for h/o immunotherapy. If positive, ask if patient is having these symptoms (they may be signs of immune-related hepatitis or colitis):     Mild nausea   Commented on: Are you having any of these problems?     On palpation bowels are sensitive   Commented on: What have you eaten in the last 2 days?     Today patient had fiber one bar, coffee, toast, banana, chicken sandwich   Commented on: What have you been drinking in the last 24 hours?     Seven up, chocolate skim milk    Protocols used: Diarrhea

## 2023-10-24 ENCOUNTER — APPOINTMENT (OUTPATIENT)
Dept: HEMATOLOGY/ONCOLOGY | Facility: CLINIC | Age: 68
End: 2023-10-24
Payer: MEDICARE

## 2023-10-24 ENCOUNTER — INFUSION (OUTPATIENT)
Dept: HEMATOLOGY/ONCOLOGY | Facility: CLINIC | Age: 68
End: 2023-10-24
Payer: MEDICARE

## 2023-10-24 VITALS
RESPIRATION RATE: 18 BRPM | DIASTOLIC BLOOD PRESSURE: 79 MMHG | WEIGHT: 122.4 LBS | SYSTOLIC BLOOD PRESSURE: 151 MMHG | OXYGEN SATURATION: 98 % | TEMPERATURE: 96.8 F | BODY MASS INDEX: 24.32 KG/M2

## 2023-10-24 DIAGNOSIS — C50.912 INVASIVE LOBULAR CARCINOMA OF LEFT BREAST IN FEMALE (MULTI): ICD-10-CM

## 2023-10-24 LAB
ALBUMIN SERPL BCP-MCNC: 4.2 G/DL (ref 3.4–5)
ALP SERPL-CCNC: 58 U/L (ref 33–136)
ALT SERPL W P-5'-P-CCNC: 20 U/L (ref 7–45)
ANION GAP SERPL CALC-SCNC: 13 MMOL/L (ref 10–20)
AST SERPL W P-5'-P-CCNC: 18 U/L (ref 9–39)
BASOPHILS # BLD AUTO: 0 X10*3/UL (ref 0–0.1)
BASOPHILS NFR BLD AUTO: 0 %
BILIRUB SERPL-MCNC: 0.4 MG/DL (ref 0–1.2)
BUN SERPL-MCNC: 24 MG/DL (ref 6–23)
CALCIUM SERPL-MCNC: 9.6 MG/DL (ref 8.6–10.3)
CHLORIDE SERPL-SCNC: 98 MMOL/L (ref 98–107)
CO2 SERPL-SCNC: 29 MMOL/L (ref 21–32)
CREAT SERPL-MCNC: 0.92 MG/DL (ref 0.5–1.05)
EOSINOPHIL # BLD AUTO: 0 X10*3/UL (ref 0–0.7)
EOSINOPHIL NFR BLD AUTO: 0 %
ERYTHROCYTE [DISTWIDTH] IN BLOOD BY AUTOMATED COUNT: 14.1 % (ref 11.5–14.5)
GFR SERPL CREATININE-BSD FRML MDRD: 68 ML/MIN/1.73M*2
GLUCOSE SERPL-MCNC: 91 MG/DL (ref 74–99)
HCT VFR BLD AUTO: 37 % (ref 36–46)
HGB BLD-MCNC: 12.1 G/DL (ref 12–16)
IMM GRANULOCYTES # BLD AUTO: 0.15 X10*3/UL (ref 0–0.7)
IMM GRANULOCYTES NFR BLD AUTO: 2 % (ref 0–0.9)
LYMPHOCYTES # BLD AUTO: 1.78 X10*3/UL (ref 1.2–4.8)
LYMPHOCYTES NFR BLD AUTO: 23.6 %
MCH RBC QN AUTO: 28.5 PG (ref 26–34)
MCHC RBC AUTO-ENTMCNC: 32.7 G/DL (ref 32–36)
MCV RBC AUTO: 87 FL (ref 80–100)
MONOCYTES # BLD AUTO: 0.51 X10*3/UL (ref 0.1–1)
MONOCYTES NFR BLD AUTO: 6.8 %
NEUTROPHILS # BLD AUTO: 5.09 X10*3/UL (ref 1.2–7.7)
NEUTROPHILS NFR BLD AUTO: 67.6 %
NRBC BLD-RTO: ABNORMAL /100{WBCS}
PLATELET # BLD AUTO: 262 X10*3/UL (ref 150–450)
PMV BLD AUTO: 9.8 FL (ref 7.5–11.5)
POTASSIUM SERPL-SCNC: 3.8 MMOL/L (ref 3.5–5.3)
PROT SERPL-MCNC: 7.2 G/DL (ref 6.4–8.2)
RBC # BLD AUTO: 4.25 X10*6/UL (ref 4–5.2)
SODIUM SERPL-SCNC: 136 MMOL/L (ref 136–145)
WBC # BLD AUTO: 7.5 X10*3/UL (ref 4.4–11.3)

## 2023-10-24 PROCEDURE — 96417 CHEMO IV INFUS EACH ADDL SEQ: CPT

## 2023-10-24 PROCEDURE — 96375 TX/PRO/DX INJ NEW DRUG ADDON: CPT | Mod: INF

## 2023-10-24 PROCEDURE — 80053 COMPREHEN METABOLIC PANEL: CPT

## 2023-10-24 PROCEDURE — 2500000004 HC RX 250 GENERAL PHARMACY W/ HCPCS (ALT 636 FOR OP/ED): Performed by: STUDENT IN AN ORGANIZED HEALTH CARE EDUCATION/TRAINING PROGRAM

## 2023-10-24 PROCEDURE — 2500000004 HC RX 250 GENERAL PHARMACY W/ HCPCS (ALT 636 FOR OP/ED): Mod: JW | Performed by: STUDENT IN AN ORGANIZED HEALTH CARE EDUCATION/TRAINING PROGRAM

## 2023-10-24 PROCEDURE — 96413 CHEMO IV INFUSION 1 HR: CPT

## 2023-10-24 PROCEDURE — 85025 COMPLETE CBC W/AUTO DIFF WBC: CPT

## 2023-10-24 RX ORDER — FAMOTIDINE 10 MG/ML
20 INJECTION INTRAVENOUS ONCE
Status: COMPLETED | OUTPATIENT
Start: 2023-10-24 | End: 2023-10-24

## 2023-10-24 RX ORDER — PROCHLORPERAZINE EDISYLATE 5 MG/ML
10 INJECTION INTRAMUSCULAR; INTRAVENOUS EVERY 6 HOURS PRN
Status: DISCONTINUED | OUTPATIENT
Start: 2023-10-24 | End: 2023-10-24 | Stop reason: HOSPADM

## 2023-10-24 RX ORDER — EPINEPHRINE 0.3 MG/.3ML
0.3 INJECTION SUBCUTANEOUS EVERY 5 MIN PRN
Status: DISCONTINUED | OUTPATIENT
Start: 2023-10-24 | End: 2023-10-24 | Stop reason: HOSPADM

## 2023-10-24 RX ORDER — DIPHENHYDRAMINE HYDROCHLORIDE 50 MG/ML
50 INJECTION INTRAMUSCULAR; INTRAVENOUS ONCE
Status: COMPLETED | OUTPATIENT
Start: 2023-10-24 | End: 2023-10-24

## 2023-10-24 RX ORDER — DIPHENHYDRAMINE HYDROCHLORIDE 50 MG/ML
50 INJECTION INTRAMUSCULAR; INTRAVENOUS AS NEEDED
Status: DISCONTINUED | OUTPATIENT
Start: 2023-10-24 | End: 2023-10-24 | Stop reason: HOSPADM

## 2023-10-24 RX ORDER — FAMOTIDINE 10 MG/ML
20 INJECTION INTRAVENOUS ONCE AS NEEDED
Status: DISCONTINUED | OUTPATIENT
Start: 2023-10-24 | End: 2023-10-24 | Stop reason: HOSPADM

## 2023-10-24 RX ORDER — ALBUTEROL SULFATE 0.83 MG/ML
3 SOLUTION RESPIRATORY (INHALATION) AS NEEDED
Status: DISCONTINUED | OUTPATIENT
Start: 2023-10-24 | End: 2023-10-24 | Stop reason: HOSPADM

## 2023-10-24 RX ORDER — PROCHLORPERAZINE MALEATE 10 MG
10 TABLET ORAL EVERY 6 HOURS PRN
Status: DISCONTINUED | OUTPATIENT
Start: 2023-10-24 | End: 2023-10-24 | Stop reason: HOSPADM

## 2023-10-24 RX ORDER — HEPARIN 100 UNIT/ML
500 SYRINGE INTRAVENOUS AS NEEDED
Status: CANCELLED | OUTPATIENT
Start: 2023-10-24

## 2023-10-24 RX ORDER — DEXAMETHASONE SODIUM PHOSPHATE 4 MG/ML
10 INJECTION, SOLUTION INTRA-ARTICULAR; INTRALESIONAL; INTRAMUSCULAR; INTRAVENOUS; SOFT TISSUE ONCE
Status: COMPLETED | OUTPATIENT
Start: 2023-10-24 | End: 2023-10-24

## 2023-10-24 RX ORDER — HEPARIN SODIUM,PORCINE/PF 10 UNIT/ML
50 SYRINGE (ML) INTRAVENOUS AS NEEDED
Status: CANCELLED | OUTPATIENT
Start: 2023-10-24

## 2023-10-24 RX ORDER — HEPARIN 100 UNIT/ML
500 SYRINGE INTRAVENOUS AS NEEDED
Status: DISCONTINUED | OUTPATIENT
Start: 2023-10-24 | End: 2023-10-24 | Stop reason: HOSPADM

## 2023-10-24 RX ADMIN — FAMOTIDINE 20 MG: 10 INJECTION INTRAVENOUS at 10:18

## 2023-10-24 RX ADMIN — SODIUM CHLORIDE, PRESERVATIVE FREE 500 UNITS: 5 INJECTION INTRAVENOUS at 12:59

## 2023-10-24 RX ADMIN — PACLITAXEL 90 MG: 6 INJECTION, SOLUTION INTRAVENOUS at 11:52

## 2023-10-24 RX ADMIN — DEXAMETHASONE SODIUM PHOSPHATE 10 MG: 4 INJECTION, SOLUTION INTRA-ARTICULAR; INTRALESIONAL; INTRAMUSCULAR; INTRAVENOUS; SOFT TISSUE at 10:19

## 2023-10-24 RX ADMIN — DIPHENHYDRAMINE HYDROCHLORIDE 50 MG: 50 INJECTION INTRAMUSCULAR; INTRAVENOUS at 10:19

## 2023-10-24 RX ADMIN — TRASTUZUMAB-ANNS 111 MG: 150 INJECTION, POWDER, LYOPHILIZED, FOR SOLUTION INTRAVENOUS at 10:54

## 2023-10-24 ASSESSMENT — PATIENT HEALTH QUESTIONNAIRE - PHQ9
5. POOR APPETITE OR OVEREATING: NOT AT ALL
10. IF YOU CHECKED OFF ANY PROBLEMS, HOW DIFFICULT HAVE THESE PROBLEMS MADE IT FOR YOU TO DO YOUR WORK, TAKE CARE OF THINGS AT HOME, OR GET ALONG WITH OTHER PEOPLE: NOT DIFFICULT AT ALL
2. FEELING DOWN, DEPRESSED, IRRITABLE, OR HOPELESS: NOT AT ALL
4. FEELING TIRED OR HAVING LITTLE ENERGY: 0
7. TROUBLE CONCENTRATING ON THINGS, SUCH AS READING THE NEWSPAPER OR WATCHING TELEVISION: NOT AT ALL
6. FEELING BAD ABOUT YOURSELF - OR THAT YOU ARE A FAILURE OR HAVE LET YOURSELF OR YOUR FAMILY DOWN: NOT AT ALL
6. FEELING BAD ABOUT YOURSELF - OR THAT YOU ARE A FAILURE OR HAVE LET YOURSELF OR YOUR FAMILY DOWN: 0
1. LITTLE INTEREST OR PLEASURE IN DOING THINGS: 0
9. THOUGHTS THAT YOU WOULD BE BETTER OFF DEAD, OR OF HURTING YOURSELF: NOT AT ALL
SUM OF ALL RESPONSES TO PHQ QUESTIONS 1-9: 1
9. THOUGHTS THAT YOU WOULD BE BETTER OFF DEAD, OR OF HURTING YOURSELF: 0
4. FEELING TIRED OR HAVING LITTLE ENERGY: NOT AT ALL
4. FEELING TIRED OR HAVING LITTLE ENERGY: NOT AT ALL
8. MOVING OR SPEAKING SO SLOWLY THAT OTHER PEOPLE COULD HAVE NOTICED. OR THE OPPOSITE, BEING SO FIGETY OR RESTLESS THAT YOU HAVE BEEN MOVING AROUND A LOT MORE THAN USUAL: NOT AT ALL
2. FEELING DOWN, DEPRESSED, IRRITABLE, OR HOPELESS: 0
8. MOVING OR SPEAKING SO SLOWLY THAT OTHER PEOPLE COULD HAVE NOTICED. OR THE OPPOSITE, BEING SO FIGETY OR RESTLESS THAT YOU HAVE BEEN MOVING AROUND A LOT MORE THAN USUAL: 0
10. IF YOU CHECKED OFF ANY PROBLEMS, HOW DIFFICULT HAVE THESE PROBLEMS MADE IT FOR YOU TO DO YOUR WORK, TAKE CARE OF THINGS AT HOME, OR GET ALONG WITH OTHER PEOPLE: NOT DIFFICULT AT ALL
3. TROUBLE FALLING OR STAYING ASLEEP OR SLEEPING TOO MUCH: SEVERAL DAYS
8. MOVING OR SPEAKING SO SLOWLY THAT OTHER PEOPLE COULD HAVE NOTICED. OR THE OPPOSITE, BEING SO FIGETY OR RESTLESS THAT YOU HAVE BEEN MOVING AROUND A LOT MORE THAN USUAL: NOT AT ALL
5. POOR APPETITE OR OVEREATING: NOT AT ALL
1. LITTLE INTEREST OR PLEASURE IN DOING THINGS: NOT AT ALL
10. IF YOU CHECKED OFF ANY PROBLEMS, HOW DIFFICULT HAVE THESE PROBLEMS MADE IT FOR YOU TO DO YOUR WORK, TAKE CARE OF THINGS AT HOME, OR GET ALONG WITH OTHER PEOPLE: NOT DIFFICULT AT ALL
3. TROUBLE FALLING OR STAYING ASLEEP OR SLEEPING TOO MUCH: SEVERAL DAYS
7. TROUBLE CONCENTRATING ON THINGS, SUCH AS READING THE NEWSPAPER OR WATCHING TELEVISION: 0
9. THOUGHTS THAT YOU WOULD BE BETTER OFF DEAD, OR OF HURTING YOURSELF: NOT AT ALL
SUM OF ALL RESPONSES TO PHQ QUESTIONS 1-9: 1
4. FEELING TIRED OR HAVING LITTLE ENERGY: NOT AT ALL
2. FEELING DOWN, DEPRESSED OR HOPELESS: NOT AT ALL
7. TROUBLE CONCENTRATING ON THINGS, SUCH AS READING THE NEWSPAPER OR WATCHING TELEVISION: NOT AT ALL
1. LITTLE INTEREST OR PLEASURE IN DOING THINGS: NOT AT ALL
7. TROUBLE CONCENTRATING ON THINGS, SUCH AS READING THE NEWSPAPER OR WATCHING TELEVISION: NOT AT ALL
6. FEELING BAD ABOUT YOURSELF - OR THAT YOU ARE A FAILURE OR HAVE LET YOURSELF OR YOUR FAMILY DOWN: NOT AT ALL
6. FEELING BAD ABOUT YOURSELF - OR THAT YOU ARE A FAILURE OR HAVE LET YOURSELF OR YOUR FAMILY DOWN: NOT AT ALL
9. THOUGHTS THAT YOU WOULD BE BETTER OFF DEAD, OR OF HURTING YOURSELF: NOT AT ALL
2. FEELING DOWN, DEPRESSED OR HOPELESS: NOT AT ALL
3. TROUBLE FALLING OR STAYING ASLEEP OR SLEEPING TOO MUCH: 1
3. TROUBLE FALLING OR STAYING ASLEEP OR SLEEPING TOO MUCH: SEVERAL DAYS
1. LITTLE INTEREST OR PLEASURE IN DOING THINGS: NOT AT ALL
5. POOR APPETITE OR OVEREATING: NOT AT ALL
SUM OF ALL RESPONSES TO PHQ QUESTIONS 1-9: 1
8. MOVING OR SPEAKING SO SLOWLY THAT OTHER PEOPLE COULD HAVE NOTICED. OR THE OPPOSITE, BEING SO FIGETY OR RESTLESS THAT YOU HAVE BEEN MOVING AROUND A LOT MORE THAN USUAL: NOT AT ALL
5. POOR APPETITE OR OVEREATING: 0

## 2023-10-24 ASSESSMENT — PAIN SCALES - GENERAL: PAINLEVEL: 0-NO PAIN

## 2023-10-24 NOTE — SIGNIFICANT EVENT
10/24/23 1002   Prechemo Checklist   Has the patient been in the hospital, ED, or urgent care since last date of service No   Chemo/Immuno Consent Signed Yes   Protocol/Indications Verified Yes   Confirmed to previous date/time of medication Yes   Compared to previous dose Yes   All medications are dated accurately Yes   Pregnancy Test Negative Not applicable   Parameters Met Yes   BSA/Weight-Height Verified Yes   Dose Calculations Verified Yes

## 2023-10-25 DIAGNOSIS — C50.912 INVASIVE LOBULAR CARCINOMA OF LEFT BREAST IN FEMALE (MULTI): Primary | ICD-10-CM

## 2023-10-25 RX ORDER — PROCHLORPERAZINE EDISYLATE 5 MG/ML
10 INJECTION INTRAMUSCULAR; INTRAVENOUS EVERY 6 HOURS PRN
Status: CANCELLED | OUTPATIENT
Start: 2023-11-21

## 2023-10-25 RX ORDER — DEXAMETHASONE SODIUM PHOSPHATE 100 MG/10ML
10 INJECTION INTRAMUSCULAR; INTRAVENOUS ONCE
Status: CANCELLED | OUTPATIENT
Start: 2023-12-05

## 2023-10-25 RX ORDER — PROCHLORPERAZINE EDISYLATE 5 MG/ML
10 INJECTION INTRAMUSCULAR; INTRAVENOUS EVERY 6 HOURS PRN
Status: CANCELLED | OUTPATIENT
Start: 2023-12-05

## 2023-10-25 RX ORDER — EPINEPHRINE 0.3 MG/.3ML
0.3 INJECTION SUBCUTANEOUS EVERY 5 MIN PRN
Status: CANCELLED | OUTPATIENT
Start: 2023-11-28

## 2023-10-25 RX ORDER — DIPHENHYDRAMINE HYDROCHLORIDE 50 MG/ML
50 INJECTION INTRAMUSCULAR; INTRAVENOUS ONCE
Status: CANCELLED
Start: 2023-12-05 | End: 2023-12-05

## 2023-10-25 RX ORDER — FAMOTIDINE 10 MG/ML
20 INJECTION INTRAVENOUS ONCE
Status: CANCELLED | OUTPATIENT
Start: 2023-12-05

## 2023-10-25 RX ORDER — DEXAMETHASONE SODIUM PHOSPHATE 100 MG/10ML
10 INJECTION INTRAMUSCULAR; INTRAVENOUS ONCE
Status: CANCELLED | OUTPATIENT
Start: 2023-12-12

## 2023-10-25 RX ORDER — DIPHENHYDRAMINE HYDROCHLORIDE 50 MG/ML
50 INJECTION INTRAMUSCULAR; INTRAVENOUS AS NEEDED
Status: CANCELLED | OUTPATIENT
Start: 2023-12-05

## 2023-10-25 RX ORDER — FAMOTIDINE 10 MG/ML
20 INJECTION INTRAVENOUS ONCE AS NEEDED
Status: CANCELLED | OUTPATIENT
Start: 2023-12-05

## 2023-10-25 RX ORDER — EPINEPHRINE 0.3 MG/.3ML
0.3 INJECTION SUBCUTANEOUS EVERY 5 MIN PRN
Status: CANCELLED | OUTPATIENT
Start: 2023-11-21

## 2023-10-25 RX ORDER — FAMOTIDINE 10 MG/ML
20 INJECTION INTRAVENOUS ONCE
Status: CANCELLED | OUTPATIENT
Start: 2023-12-12

## 2023-10-25 RX ORDER — PROCHLORPERAZINE MALEATE 5 MG
10 TABLET ORAL EVERY 6 HOURS PRN
Status: CANCELLED | OUTPATIENT
Start: 2023-12-05

## 2023-10-25 RX ORDER — ALBUTEROL SULFATE 0.83 MG/ML
3 SOLUTION RESPIRATORY (INHALATION) AS NEEDED
Status: CANCELLED | OUTPATIENT
Start: 2023-11-28

## 2023-10-25 RX ORDER — ALBUTEROL SULFATE 0.83 MG/ML
3 SOLUTION RESPIRATORY (INHALATION) AS NEEDED
Status: CANCELLED | OUTPATIENT
Start: 2023-12-05

## 2023-10-25 RX ORDER — PROCHLORPERAZINE MALEATE 5 MG
10 TABLET ORAL EVERY 6 HOURS PRN
Status: CANCELLED | OUTPATIENT
Start: 2023-11-28

## 2023-10-25 RX ORDER — EPINEPHRINE 0.3 MG/.3ML
0.3 INJECTION SUBCUTANEOUS EVERY 5 MIN PRN
Status: CANCELLED | OUTPATIENT
Start: 2023-12-05

## 2023-10-25 RX ORDER — DIPHENHYDRAMINE HYDROCHLORIDE 50 MG/ML
50 INJECTION INTRAMUSCULAR; INTRAVENOUS ONCE
Status: CANCELLED
Start: 2023-11-28 | End: 2023-11-28

## 2023-10-25 RX ORDER — ALBUTEROL SULFATE 0.83 MG/ML
3 SOLUTION RESPIRATORY (INHALATION) AS NEEDED
Status: CANCELLED | OUTPATIENT
Start: 2023-11-21

## 2023-10-25 RX ORDER — DEXAMETHASONE SODIUM PHOSPHATE 100 MG/10ML
10 INJECTION INTRAMUSCULAR; INTRAVENOUS ONCE
Status: CANCELLED | OUTPATIENT
Start: 2023-11-28

## 2023-10-25 RX ORDER — DIPHENHYDRAMINE HYDROCHLORIDE 50 MG/ML
50 INJECTION INTRAMUSCULAR; INTRAVENOUS ONCE
Status: CANCELLED
Start: 2023-12-12 | End: 2023-12-12

## 2023-10-25 RX ORDER — PROCHLORPERAZINE MALEATE 5 MG
10 TABLET ORAL EVERY 6 HOURS PRN
Status: CANCELLED | OUTPATIENT
Start: 2023-12-12

## 2023-10-25 RX ORDER — FAMOTIDINE 10 MG/ML
20 INJECTION INTRAVENOUS ONCE AS NEEDED
Status: CANCELLED | OUTPATIENT
Start: 2023-12-12

## 2023-10-25 RX ORDER — FAMOTIDINE 10 MG/ML
20 INJECTION INTRAVENOUS ONCE AS NEEDED
Status: CANCELLED | OUTPATIENT
Start: 2023-11-28

## 2023-10-25 RX ORDER — PROCHLORPERAZINE MALEATE 5 MG
10 TABLET ORAL EVERY 6 HOURS PRN
Status: CANCELLED | OUTPATIENT
Start: 2023-11-21

## 2023-10-25 RX ORDER — DIPHENHYDRAMINE HYDROCHLORIDE 50 MG/ML
50 INJECTION INTRAMUSCULAR; INTRAVENOUS AS NEEDED
Status: CANCELLED | OUTPATIENT
Start: 2023-12-12

## 2023-10-25 RX ORDER — ALBUTEROL SULFATE 0.83 MG/ML
3 SOLUTION RESPIRATORY (INHALATION) AS NEEDED
Status: CANCELLED | OUTPATIENT
Start: 2023-12-12

## 2023-10-25 RX ORDER — PROCHLORPERAZINE EDISYLATE 5 MG/ML
10 INJECTION INTRAMUSCULAR; INTRAVENOUS EVERY 6 HOURS PRN
Status: CANCELLED | OUTPATIENT
Start: 2023-11-28

## 2023-10-25 RX ORDER — DIPHENHYDRAMINE HYDROCHLORIDE 50 MG/ML
50 INJECTION INTRAMUSCULAR; INTRAVENOUS ONCE
Status: CANCELLED
Start: 2023-11-21 | End: 2023-11-21

## 2023-10-25 RX ORDER — DIPHENHYDRAMINE HYDROCHLORIDE 50 MG/ML
50 INJECTION INTRAMUSCULAR; INTRAVENOUS AS NEEDED
Status: CANCELLED | OUTPATIENT
Start: 2023-11-28

## 2023-10-25 RX ORDER — FAMOTIDINE 10 MG/ML
20 INJECTION INTRAVENOUS ONCE AS NEEDED
Status: CANCELLED | OUTPATIENT
Start: 2023-11-21

## 2023-10-25 RX ORDER — EPINEPHRINE 0.3 MG/.3ML
0.3 INJECTION SUBCUTANEOUS EVERY 5 MIN PRN
Status: CANCELLED | OUTPATIENT
Start: 2023-12-12

## 2023-10-25 RX ORDER — FAMOTIDINE 10 MG/ML
20 INJECTION INTRAVENOUS ONCE
Status: CANCELLED | OUTPATIENT
Start: 2023-11-28

## 2023-10-25 RX ORDER — PROCHLORPERAZINE EDISYLATE 5 MG/ML
10 INJECTION INTRAMUSCULAR; INTRAVENOUS EVERY 6 HOURS PRN
Status: CANCELLED | OUTPATIENT
Start: 2023-12-12

## 2023-10-25 RX ORDER — DEXAMETHASONE SODIUM PHOSPHATE 100 MG/10ML
10 INJECTION INTRAMUSCULAR; INTRAVENOUS ONCE
Status: CANCELLED | OUTPATIENT
Start: 2023-11-21

## 2023-10-25 RX ORDER — DIPHENHYDRAMINE HYDROCHLORIDE 50 MG/ML
50 INJECTION INTRAMUSCULAR; INTRAVENOUS AS NEEDED
Status: CANCELLED | OUTPATIENT
Start: 2023-11-21

## 2023-10-25 RX ORDER — FAMOTIDINE 10 MG/ML
20 INJECTION INTRAVENOUS ONCE
Status: CANCELLED | OUTPATIENT
Start: 2023-11-21

## 2023-10-30 ENCOUNTER — LAB (OUTPATIENT)
Dept: LAB | Facility: LAB | Age: 68
End: 2023-10-30
Payer: MEDICARE

## 2023-10-30 DIAGNOSIS — C50.912 INVASIVE LOBULAR CARCINOMA OF LEFT BREAST IN FEMALE (MULTI): ICD-10-CM

## 2023-10-30 LAB
ALBUMIN SERPL BCP-MCNC: 4 G/DL (ref 3.4–5)
ALP SERPL-CCNC: 64 U/L (ref 33–136)
ALT SERPL W P-5'-P-CCNC: 86 U/L (ref 7–45)
ANION GAP SERPL CALC-SCNC: 12 MMOL/L (ref 10–20)
AST SERPL W P-5'-P-CCNC: 32 U/L (ref 9–39)
BASOPHILS # BLD AUTO: 0.02 X10*3/UL (ref 0–0.1)
BASOPHILS NFR BLD AUTO: 0.3 %
BILIRUB SERPL-MCNC: 0.5 MG/DL (ref 0–1.2)
BUN SERPL-MCNC: 29 MG/DL (ref 6–23)
CALCIUM SERPL-MCNC: 9.5 MG/DL (ref 8.6–10.3)
CHLORIDE SERPL-SCNC: 98 MMOL/L (ref 98–107)
CO2 SERPL-SCNC: 28 MMOL/L (ref 21–32)
CREAT SERPL-MCNC: 0.9 MG/DL (ref 0.5–1.05)
EOSINOPHIL # BLD AUTO: 0 X10*3/UL (ref 0–0.7)
EOSINOPHIL NFR BLD AUTO: 0 %
ERYTHROCYTE [DISTWIDTH] IN BLOOD BY AUTOMATED COUNT: 14.6 % (ref 11.5–14.5)
GFR SERPL CREATININE-BSD FRML MDRD: 70 ML/MIN/1.73M*2
GLUCOSE SERPL-MCNC: 155 MG/DL (ref 74–99)
HCT VFR BLD AUTO: 37.7 % (ref 36–46)
HGB BLD-MCNC: 12.7 G/DL (ref 12–16)
IMM GRANULOCYTES # BLD AUTO: 0.12 X10*3/UL (ref 0–0.7)
IMM GRANULOCYTES NFR BLD AUTO: 1.9 % (ref 0–0.9)
LYMPHOCYTES # BLD AUTO: 0.9 X10*3/UL (ref 1.2–4.8)
LYMPHOCYTES NFR BLD AUTO: 14.3 %
MCH RBC QN AUTO: 28.7 PG (ref 26–34)
MCHC RBC AUTO-ENTMCNC: 33.7 G/DL (ref 32–36)
MCV RBC AUTO: 85 FL (ref 80–100)
MONOCYTES # BLD AUTO: 0.3 X10*3/UL (ref 0.1–1)
MONOCYTES NFR BLD AUTO: 4.8 %
NEUTROPHILS # BLD AUTO: 4.95 X10*3/UL (ref 1.2–7.7)
NEUTROPHILS NFR BLD AUTO: 78.7 %
NRBC BLD-RTO: 0 /100 WBCS (ref 0–0)
PLATELET # BLD AUTO: 347 X10*3/UL (ref 150–450)
PMV BLD AUTO: 10.1 FL (ref 7.5–11.5)
POTASSIUM SERPL-SCNC: 4.2 MMOL/L (ref 3.5–5.3)
PROT SERPL-MCNC: 6.9 G/DL (ref 6.4–8.2)
RBC # BLD AUTO: 4.42 X10*6/UL (ref 4–5.2)
SODIUM SERPL-SCNC: 134 MMOL/L (ref 136–145)
WBC # BLD AUTO: 6.3 X10*3/UL (ref 4.4–11.3)

## 2023-10-30 PROCEDURE — 80053 COMPREHEN METABOLIC PANEL: CPT

## 2023-10-30 PROCEDURE — 85025 COMPLETE CBC W/AUTO DIFF WBC: CPT

## 2023-10-31 ENCOUNTER — INFUSION (OUTPATIENT)
Dept: HEMATOLOGY/ONCOLOGY | Facility: CLINIC | Age: 68
End: 2023-10-31
Payer: MEDICARE

## 2023-10-31 ENCOUNTER — APPOINTMENT (OUTPATIENT)
Dept: HEMATOLOGY/ONCOLOGY | Facility: CLINIC | Age: 68
End: 2023-10-31
Payer: MEDICARE

## 2023-10-31 VITALS
HEART RATE: 78 BPM | BODY MASS INDEX: 24.6 KG/M2 | DIASTOLIC BLOOD PRESSURE: 74 MMHG | RESPIRATION RATE: 16 BRPM | WEIGHT: 122 LBS | TEMPERATURE: 97 F | SYSTOLIC BLOOD PRESSURE: 121 MMHG | HEIGHT: 59 IN | OXYGEN SATURATION: 97 %

## 2023-10-31 DIAGNOSIS — C50.912 INVASIVE LOBULAR CARCINOMA OF LEFT BREAST IN FEMALE (MULTI): ICD-10-CM

## 2023-10-31 PROCEDURE — 2500000004 HC RX 250 GENERAL PHARMACY W/ HCPCS (ALT 636 FOR OP/ED): Mod: JW | Performed by: STUDENT IN AN ORGANIZED HEALTH CARE EDUCATION/TRAINING PROGRAM

## 2023-10-31 PROCEDURE — 96413 CHEMO IV INFUSION 1 HR: CPT

## 2023-10-31 PROCEDURE — 96375 TX/PRO/DX INJ NEW DRUG ADDON: CPT | Mod: INF

## 2023-10-31 PROCEDURE — 2500000004 HC RX 250 GENERAL PHARMACY W/ HCPCS (ALT 636 FOR OP/ED): Performed by: STUDENT IN AN ORGANIZED HEALTH CARE EDUCATION/TRAINING PROGRAM

## 2023-10-31 PROCEDURE — 96417 CHEMO IV INFUS EACH ADDL SEQ: CPT

## 2023-10-31 RX ORDER — ALBUTEROL SULFATE 0.83 MG/ML
3 SOLUTION RESPIRATORY (INHALATION) AS NEEDED
Status: DISCONTINUED | OUTPATIENT
Start: 2023-10-31 | End: 2023-10-31 | Stop reason: HOSPADM

## 2023-10-31 RX ORDER — DIPHENHYDRAMINE HYDROCHLORIDE 50 MG/ML
50 INJECTION INTRAMUSCULAR; INTRAVENOUS ONCE
Status: COMPLETED | OUTPATIENT
Start: 2023-10-31 | End: 2023-10-31

## 2023-10-31 RX ORDER — FAMOTIDINE 10 MG/ML
20 INJECTION INTRAVENOUS ONCE AS NEEDED
Status: DISCONTINUED | OUTPATIENT
Start: 2023-10-31 | End: 2023-10-31 | Stop reason: HOSPADM

## 2023-10-31 RX ORDER — DIPHENHYDRAMINE HYDROCHLORIDE 50 MG/ML
50 INJECTION INTRAMUSCULAR; INTRAVENOUS AS NEEDED
Status: DISCONTINUED | OUTPATIENT
Start: 2023-10-31 | End: 2023-10-31 | Stop reason: HOSPADM

## 2023-10-31 RX ORDER — FAMOTIDINE 10 MG/ML
20 INJECTION INTRAVENOUS ONCE
Status: COMPLETED | OUTPATIENT
Start: 2023-10-31 | End: 2023-10-31

## 2023-10-31 RX ORDER — DEXAMETHASONE SODIUM PHOSPHATE 100 MG/10ML
10 INJECTION INTRAMUSCULAR; INTRAVENOUS ONCE
Status: COMPLETED | OUTPATIENT
Start: 2023-10-31 | End: 2023-10-31

## 2023-10-31 RX ORDER — PROCHLORPERAZINE EDISYLATE 5 MG/ML
10 INJECTION INTRAMUSCULAR; INTRAVENOUS EVERY 6 HOURS PRN
Status: DISCONTINUED | OUTPATIENT
Start: 2023-10-31 | End: 2023-10-31 | Stop reason: HOSPADM

## 2023-10-31 RX ORDER — HEPARIN 100 UNIT/ML
SYRINGE INTRAVENOUS
Status: DISPENSED
Start: 2023-10-31 | End: 2023-11-01

## 2023-10-31 RX ORDER — EPINEPHRINE 0.3 MG/.3ML
0.3 INJECTION SUBCUTANEOUS EVERY 5 MIN PRN
Status: DISCONTINUED | OUTPATIENT
Start: 2023-10-31 | End: 2023-10-31 | Stop reason: HOSPADM

## 2023-10-31 RX ORDER — PROCHLORPERAZINE MALEATE 10 MG
10 TABLET ORAL EVERY 6 HOURS PRN
Status: DISCONTINUED | OUTPATIENT
Start: 2023-10-31 | End: 2023-10-31 | Stop reason: HOSPADM

## 2023-10-31 RX ADMIN — TRASTUZUMAB-ANNS 111.3 MG: 150 INJECTION, POWDER, LYOPHILIZED, FOR SOLUTION INTRAVENOUS at 12:19

## 2023-10-31 RX ADMIN — FAMOTIDINE 20 MG: 10 INJECTION INTRAVENOUS at 11:47

## 2023-10-31 RX ADMIN — PACLITAXEL 90 MG: 6 INJECTION, SOLUTION, CONCENTRATE INTRAVENOUS at 13:09

## 2023-10-31 RX ADMIN — DEXAMETHASONE SODIUM PHOSPHATE 10 MG: 10 INJECTION INTRAMUSCULAR; INTRAVENOUS at 11:47

## 2023-10-31 RX ADMIN — DIPHENHYDRAMINE HYDROCHLORIDE 50 MG: 50 INJECTION INTRAMUSCULAR; INTRAVENOUS at 11:47

## 2023-10-31 ASSESSMENT — PATIENT HEALTH QUESTIONNAIRE - PHQ9
6. FEELING BAD ABOUT YOURSELF - OR THAT YOU ARE A FAILURE OR HAVE LET YOURSELF OR YOUR FAMILY DOWN: NOT AT ALL
9. THOUGHTS THAT YOU WOULD BE BETTER OFF DEAD, OR OF HURTING YOURSELF: NOT AT ALL
4. FEELING TIRED OR HAVING LITTLE ENERGY: NOT AT ALL
4. FEELING TIRED OR HAVING LITTLE ENERGY: 0
1. LITTLE INTEREST OR PLEASURE IN DOING THINGS: 0
1. LITTLE INTEREST OR PLEASURE IN DOING THINGS: NOT AT ALL
2. FEELING DOWN, DEPRESSED, IRRITABLE, OR HOPELESS: 0
9. THOUGHTS THAT YOU WOULD BE BETTER OFF DEAD, OR OF HURTING YOURSELF: NOT AT ALL
6. FEELING BAD ABOUT YOURSELF - OR THAT YOU ARE A FAILURE OR HAVE LET YOURSELF OR YOUR FAMILY DOWN: 0
SUM OF ALL RESPONSES TO PHQ QUESTIONS 1-9: 1
6. FEELING BAD ABOUT YOURSELF - OR THAT YOU ARE A FAILURE OR HAVE LET YOURSELF OR YOUR FAMILY DOWN: NOT AT ALL
10. IF YOU CHECKED OFF ANY PROBLEMS, HOW DIFFICULT HAVE THESE PROBLEMS MADE IT FOR YOU TO DO YOUR WORK, TAKE CARE OF THINGS AT HOME, OR GET ALONG WITH OTHER PEOPLE: NOT DIFFICULT AT ALL
6. FEELING BAD ABOUT YOURSELF - OR THAT YOU ARE A FAILURE OR HAVE LET YOURSELF OR YOUR FAMILY DOWN: 0
3. TROUBLE FALLING OR STAYING ASLEEP OR SLEEPING TOO MUCH: SEVERAL DAYS
8. MOVING OR SPEAKING SO SLOWLY THAT OTHER PEOPLE COULD HAVE NOTICED. OR THE OPPOSITE, BEING SO FIGETY OR RESTLESS THAT YOU HAVE BEEN MOVING AROUND A LOT MORE THAN USUAL: NOT AT ALL
7. TROUBLE CONCENTRATING ON THINGS, SUCH AS READING THE NEWSPAPER OR WATCHING TELEVISION: 0
SUM OF ALL RESPONSES TO PHQ QUESTIONS 1-9: 1
3. TROUBLE FALLING OR STAYING ASLEEP OR SLEEPING TOO MUCH: 1
6. FEELING BAD ABOUT YOURSELF - OR THAT YOU ARE A FAILURE OR HAVE LET YOURSELF OR YOUR FAMILY DOWN: NOT AT ALL
8. MOVING OR SPEAKING SO SLOWLY THAT OTHER PEOPLE COULD HAVE NOTICED. OR THE OPPOSITE, BEING SO FIGETY OR RESTLESS THAT YOU HAVE BEEN MOVING AROUND A LOT MORE THAN USUAL: 0
2. FEELING DOWN, DEPRESSED, IRRITABLE, OR HOPELESS: NOT AT ALL
7. TROUBLE CONCENTRATING ON THINGS, SUCH AS READING THE NEWSPAPER OR WATCHING TELEVISION: NOT AT ALL
10. IF YOU CHECKED OFF ANY PROBLEMS, HOW DIFFICULT HAVE THESE PROBLEMS MADE IT FOR YOU TO DO YOUR WORK, TAKE CARE OF THINGS AT HOME, OR GET ALONG WITH OTHER PEOPLE: NOT DIFFICULT AT ALL
3. TROUBLE FALLING OR STAYING ASLEEP OR SLEEPING TOO MUCH: 1
5. POOR APPETITE OR OVEREATING: NOT AT ALL
8. MOVING OR SPEAKING SO SLOWLY THAT OTHER PEOPLE COULD HAVE NOTICED. OR THE OPPOSITE, BEING SO FIGETY OR RESTLESS THAT YOU HAVE BEEN MOVING AROUND A LOT MORE THAN USUAL: NOT AT ALL
4. FEELING TIRED OR HAVING LITTLE ENERGY: NOT AT ALL
9. THOUGHTS THAT YOU WOULD BE BETTER OFF DEAD, OR OF HURTING YOURSELF: 0
3. TROUBLE FALLING OR STAYING ASLEEP OR SLEEPING TOO MUCH: SEVERAL DAYS
9. THOUGHTS THAT YOU WOULD BE BETTER OFF DEAD, OR OF HURTING YOURSELF: NOT AT ALL
1. LITTLE INTEREST OR PLEASURE IN DOING THINGS: NOT AT ALL
6. FEELING BAD ABOUT YOURSELF - OR THAT YOU ARE A FAILURE OR HAVE LET YOURSELF OR YOUR FAMILY DOWN: NOT AT ALL
4. FEELING TIRED OR HAVING LITTLE ENERGY: NOT AT ALL
9. THOUGHTS THAT YOU WOULD BE BETTER OFF DEAD, OR OF HURTING YOURSELF: NOT AT ALL
7. TROUBLE CONCENTRATING ON THINGS, SUCH AS READING THE NEWSPAPER OR WATCHING TELEVISION: 0
4. FEELING TIRED OR HAVING LITTLE ENERGY: NOT AT ALL
8. MOVING OR SPEAKING SO SLOWLY THAT OTHER PEOPLE COULD HAVE NOTICED. OR THE OPPOSITE, BEING SO FIGETY OR RESTLESS THAT YOU HAVE BEEN MOVING AROUND A LOT MORE THAN USUAL: 0
5. POOR APPETITE OR OVEREATING: NOT AT ALL
2. FEELING DOWN, DEPRESSED OR HOPELESS: NOT AT ALL
7. TROUBLE CONCENTRATING ON THINGS, SUCH AS READING THE NEWSPAPER OR WATCHING TELEVISION: NOT AT ALL
5. POOR APPETITE OR OVEREATING: NOT AT ALL
9. THOUGHTS THAT YOU WOULD BE BETTER OFF DEAD, OR OF HURTING YOURSELF: 0
SUM OF ALL RESPONSES TO PHQ QUESTIONS 1-9: 1
1. LITTLE INTEREST OR PLEASURE IN DOING THINGS: NOT AT ALL
1. LITTLE INTEREST OR PLEASURE IN DOING THINGS: NOT AT ALL
3. TROUBLE FALLING OR STAYING ASLEEP OR SLEEPING TOO MUCH: SEVERAL DAYS
SUM OF ALL RESPONSES TO PHQ QUESTIONS 1-9: 1
5. POOR APPETITE OR OVEREATING: NOT AT ALL
1. LITTLE INTEREST OR PLEASURE IN DOING THINGS: NOT AT ALL
SUM OF ALL RESPONSES TO PHQ9 QUESTIONS 1 AND 2: 0
8. MOVING OR SPEAKING SO SLOWLY THAT OTHER PEOPLE COULD HAVE NOTICED. OR THE OPPOSITE, BEING SO FIGETY OR RESTLESS THAT YOU HAVE BEEN MOVING AROUND A LOT MORE THAN USUAL: NOT AT ALL
1. LITTLE INTEREST OR PLEASURE IN DOING THINGS: 0
8. MOVING OR SPEAKING SO SLOWLY THAT OTHER PEOPLE COULD HAVE NOTICED. OR THE OPPOSITE, BEING SO FIGETY OR RESTLESS THAT YOU HAVE BEEN MOVING AROUND A LOT MORE THAN USUAL: NOT AT ALL
5. POOR APPETITE OR OVEREATING: 0
4. FEELING TIRED OR HAVING LITTLE ENERGY: 0
2. FEELING DOWN, DEPRESSED, IRRITABLE, OR HOPELESS: NOT AT ALL
7. TROUBLE CONCENTRATING ON THINGS, SUCH AS READING THE NEWSPAPER OR WATCHING TELEVISION: NOT AT ALL
10. IF YOU CHECKED OFF ANY PROBLEMS, HOW DIFFICULT HAVE THESE PROBLEMS MADE IT FOR YOU TO DO YOUR WORK, TAKE CARE OF THINGS AT HOME, OR GET ALONG WITH OTHER PEOPLE: NOT DIFFICULT AT ALL
3. TROUBLE FALLING OR STAYING ASLEEP OR SLEEPING TOO MUCH: SEVERAL DAYS
7. TROUBLE CONCENTRATING ON THINGS, SUCH AS READING THE NEWSPAPER OR WATCHING TELEVISION: NOT AT ALL
5. POOR APPETITE OR OVEREATING: 0
2. FEELING DOWN, DEPRESSED OR HOPELESS: NOT AT ALL
2. FEELING DOWN, DEPRESSED, IRRITABLE, OR HOPELESS: 0
2. FEELING DOWN, DEPRESSED OR HOPELESS: NOT AT ALL
10. IF YOU CHECKED OFF ANY PROBLEMS, HOW DIFFICULT HAVE THESE PROBLEMS MADE IT FOR YOU TO DO YOUR WORK, TAKE CARE OF THINGS AT HOME, OR GET ALONG WITH OTHER PEOPLE: NOT DIFFICULT AT ALL

## 2023-10-31 ASSESSMENT — COLUMBIA-SUICIDE SEVERITY RATING SCALE - C-SSRS
2. HAVE YOU ACTUALLY HAD ANY THOUGHTS OF KILLING YOURSELF?: NO
6. HAVE YOU EVER DONE ANYTHING, STARTED TO DO ANYTHING, OR PREPARED TO DO ANYTHING TO END YOUR LIFE?: NO
1. IN THE PAST MONTH, HAVE YOU WISHED YOU WERE DEAD OR WISHED YOU COULD GO TO SLEEP AND NOT WAKE UP?: NO

## 2023-10-31 ASSESSMENT — PAIN SCALES - GENERAL: PAINLEVEL: 0-NO PAIN

## 2023-10-31 NOTE — SIGNIFICANT EVENT
10/31/23 1046   Prechemo Checklist   Has the patient been in the hospital, ED, or urgent care since last date of service No   Chemo/Immuno Consent Signed Yes   Protocol/Indications Verified Yes   Confirmed to previous date/time of medication Yes   Compared to previous dose Yes   All medications are dated accurately Yes   Pregnancy Test Negative Not applicable   Parameters Met Yes   BSA/Weight-Height Verified Yes   Dose Calculations Verified Yes

## 2023-11-07 ENCOUNTER — INFUSION (OUTPATIENT)
Dept: HEMATOLOGY/ONCOLOGY | Facility: CLINIC | Age: 68
End: 2023-11-07
Payer: MEDICARE

## 2023-11-07 VITALS
SYSTOLIC BLOOD PRESSURE: 148 MMHG | BODY MASS INDEX: 24.04 KG/M2 | RESPIRATION RATE: 16 BRPM | HEART RATE: 78 BPM | OXYGEN SATURATION: 98 % | TEMPERATURE: 96.6 F | WEIGHT: 121 LBS | DIASTOLIC BLOOD PRESSURE: 83 MMHG

## 2023-11-07 DIAGNOSIS — C50.912 INVASIVE LOBULAR CARCINOMA OF LEFT BREAST IN FEMALE (MULTI): ICD-10-CM

## 2023-11-07 DIAGNOSIS — C50.912 INVASIVE LOBULAR CARCINOMA OF LEFT BREAST IN FEMALE (MULTI): Primary | ICD-10-CM

## 2023-11-07 LAB
ALBUMIN SERPL BCP-MCNC: 3.7 G/DL (ref 3.4–5)
ALP SERPL-CCNC: 70 U/L (ref 33–136)
ALT SERPL W P-5'-P-CCNC: 41 U/L (ref 7–45)
ANION GAP SERPL CALC-SCNC: 11 MMOL/L (ref 10–20)
AST SERPL W P-5'-P-CCNC: 23 U/L (ref 9–39)
BASOPHILS # BLD AUTO: 0.02 X10*3/UL (ref 0–0.1)
BASOPHILS NFR BLD AUTO: 0.6 %
BILIRUB SERPL-MCNC: 0.3 MG/DL (ref 0–1.2)
BUN SERPL-MCNC: 17 MG/DL (ref 6–23)
CALCIUM SERPL-MCNC: 8.8 MG/DL (ref 8.6–10.3)
CHLORIDE SERPL-SCNC: 101 MMOL/L (ref 98–107)
CO2 SERPL-SCNC: 28 MMOL/L (ref 21–32)
CREAT SERPL-MCNC: 0.81 MG/DL (ref 0.5–1.05)
EOSINOPHIL # BLD AUTO: 0.04 X10*3/UL (ref 0–0.7)
EOSINOPHIL NFR BLD AUTO: 1.3 %
ERYTHROCYTE [DISTWIDTH] IN BLOOD BY AUTOMATED COUNT: 15.1 % (ref 11.5–14.5)
GFR SERPL CREATININE-BSD FRML MDRD: 79 ML/MIN/1.73M*2
GLUCOSE SERPL-MCNC: 93 MG/DL (ref 74–99)
HCT VFR BLD AUTO: 31.8 % (ref 36–46)
HGB BLD-MCNC: 10.4 G/DL (ref 12–16)
IMM GRANULOCYTES # BLD AUTO: 0.03 X10*3/UL (ref 0–0.7)
IMM GRANULOCYTES NFR BLD AUTO: 0.9 % (ref 0–0.9)
LYMPHOCYTES # BLD AUTO: 1.17 X10*3/UL (ref 1.2–4.8)
LYMPHOCYTES NFR BLD AUTO: 36.9 %
MCH RBC QN AUTO: 29.1 PG (ref 26–34)
MCHC RBC AUTO-ENTMCNC: 32.7 G/DL (ref 32–36)
MCV RBC AUTO: 89 FL (ref 80–100)
MONOCYTES # BLD AUTO: 0.36 X10*3/UL (ref 0.1–1)
MONOCYTES NFR BLD AUTO: 11.4 %
NEUTROPHILS # BLD AUTO: 1.55 X10*3/UL (ref 1.2–7.7)
NEUTROPHILS NFR BLD AUTO: 48.9 %
NRBC BLD-RTO: ABNORMAL /100{WBCS}
PLATELET # BLD AUTO: 201 X10*3/UL (ref 150–450)
POTASSIUM SERPL-SCNC: 3.7 MMOL/L (ref 3.5–5.3)
PROT SERPL-MCNC: 6.8 G/DL (ref 6.4–8.2)
RBC # BLD AUTO: 3.57 X10*6/UL (ref 4–5.2)
SODIUM SERPL-SCNC: 136 MMOL/L (ref 136–145)
WBC # BLD AUTO: 3.2 X10*3/UL (ref 4.4–11.3)

## 2023-11-07 PROCEDURE — 96417 CHEMO IV INFUS EACH ADDL SEQ: CPT

## 2023-11-07 PROCEDURE — 2500000004 HC RX 250 GENERAL PHARMACY W/ HCPCS (ALT 636 FOR OP/ED): Performed by: STUDENT IN AN ORGANIZED HEALTH CARE EDUCATION/TRAINING PROGRAM

## 2023-11-07 PROCEDURE — 85025 COMPLETE CBC W/AUTO DIFF WBC: CPT

## 2023-11-07 PROCEDURE — 96375 TX/PRO/DX INJ NEW DRUG ADDON: CPT | Mod: INF

## 2023-11-07 PROCEDURE — 80053 COMPREHEN METABOLIC PANEL: CPT

## 2023-11-07 PROCEDURE — 96413 CHEMO IV INFUSION 1 HR: CPT

## 2023-11-07 RX ORDER — DIPHENHYDRAMINE HYDROCHLORIDE 50 MG/ML
50 INJECTION INTRAMUSCULAR; INTRAVENOUS AS NEEDED
Status: DISCONTINUED | OUTPATIENT
Start: 2023-11-07 | End: 2023-11-07 | Stop reason: HOSPADM

## 2023-11-07 RX ORDER — HEPARIN SODIUM,PORCINE/PF 10 UNIT/ML
50 SYRINGE (ML) INTRAVENOUS AS NEEDED
Status: CANCELLED | OUTPATIENT
Start: 2023-11-07

## 2023-11-07 RX ORDER — EPINEPHRINE 0.3 MG/.3ML
0.3 INJECTION SUBCUTANEOUS EVERY 5 MIN PRN
Status: DISCONTINUED | OUTPATIENT
Start: 2023-11-07 | End: 2023-11-07 | Stop reason: HOSPADM

## 2023-11-07 RX ORDER — FAMOTIDINE 10 MG/ML
20 INJECTION INTRAVENOUS ONCE AS NEEDED
Status: DISCONTINUED | OUTPATIENT
Start: 2023-11-07 | End: 2023-11-07 | Stop reason: HOSPADM

## 2023-11-07 RX ORDER — DEXAMETHASONE SODIUM PHOSPHATE 100 MG/10ML
10 INJECTION INTRAMUSCULAR; INTRAVENOUS ONCE
Status: COMPLETED | OUTPATIENT
Start: 2023-11-07 | End: 2023-11-07

## 2023-11-07 RX ORDER — HEPARIN 100 UNIT/ML
500 SYRINGE INTRAVENOUS AS NEEDED
Status: DISCONTINUED | OUTPATIENT
Start: 2023-11-07 | End: 2023-11-07 | Stop reason: HOSPADM

## 2023-11-07 RX ORDER — PROCHLORPERAZINE MALEATE 10 MG
10 TABLET ORAL EVERY 6 HOURS PRN
Status: DISCONTINUED | OUTPATIENT
Start: 2023-11-07 | End: 2023-11-07 | Stop reason: HOSPADM

## 2023-11-07 RX ORDER — DIPHENHYDRAMINE HYDROCHLORIDE 50 MG/ML
50 INJECTION INTRAMUSCULAR; INTRAVENOUS ONCE
Status: COMPLETED | OUTPATIENT
Start: 2023-11-07 | End: 2023-11-07

## 2023-11-07 RX ORDER — HEPARIN 100 UNIT/ML
500 SYRINGE INTRAVENOUS AS NEEDED
Status: CANCELLED | OUTPATIENT
Start: 2023-11-07

## 2023-11-07 RX ORDER — PROCHLORPERAZINE EDISYLATE 5 MG/ML
10 INJECTION INTRAMUSCULAR; INTRAVENOUS EVERY 6 HOURS PRN
Status: DISCONTINUED | OUTPATIENT
Start: 2023-11-07 | End: 2023-11-07 | Stop reason: HOSPADM

## 2023-11-07 RX ORDER — FAMOTIDINE 10 MG/ML
20 INJECTION INTRAVENOUS ONCE
Status: COMPLETED | OUTPATIENT
Start: 2023-11-07 | End: 2023-11-07

## 2023-11-07 RX ORDER — ALBUTEROL SULFATE 0.83 MG/ML
3 SOLUTION RESPIRATORY (INHALATION) AS NEEDED
Status: DISCONTINUED | OUTPATIENT
Start: 2023-11-07 | End: 2023-11-07 | Stop reason: HOSPADM

## 2023-11-07 RX ADMIN — TRASTUZUMAB-ANNS 111.3 MG: 150 INJECTION, POWDER, LYOPHILIZED, FOR SOLUTION INTRAVENOUS at 11:10

## 2023-11-07 RX ADMIN — PACLITAXEL 90 MG: 6 INJECTION, SOLUTION, CONCENTRATE INTRAVENOUS at 11:53

## 2023-11-07 RX ADMIN — DIPHENHYDRAMINE HYDROCHLORIDE 50 MG: 50 INJECTION INTRAMUSCULAR; INTRAVENOUS at 10:35

## 2023-11-07 RX ADMIN — FAMOTIDINE 20 MG: 10 INJECTION INTRAVENOUS at 10:36

## 2023-11-07 RX ADMIN — DEXAMETHASONE SODIUM PHOSPHATE 10 MG: 10 INJECTION INTRAMUSCULAR; INTRAVENOUS at 10:36

## 2023-11-07 ASSESSMENT — PAIN SCALES - GENERAL: PAINLEVEL: 0-NO PAIN

## 2023-11-07 NOTE — SIGNIFICANT EVENT
11/07/23 1142   Harley Scale   Sensory Perceptions 4   Moisture 4   Activity 4   Mobility 4   Nutrition 3   Friction and Shear 3   Harley Scale Score 22

## 2023-11-10 ENCOUNTER — TELEPHONE (OUTPATIENT)
Dept: OBSTETRICS AND GYNECOLOGY | Facility: CLINIC | Age: 68
End: 2023-11-10
Payer: MEDICARE

## 2023-11-10 ASSESSMENT — PATIENT HEALTH QUESTIONNAIRE - PHQ9
5. POOR APPETITE OR OVEREATING: NOT AT ALL
2. FEELING DOWN, DEPRESSED, IRRITABLE, OR HOPELESS: SEVERAL DAYS
8. MOVING OR SPEAKING SO SLOWLY THAT OTHER PEOPLE COULD HAVE NOTICED. OR THE OPPOSITE, BEING SO FIGETY OR RESTLESS THAT YOU HAVE BEEN MOVING AROUND A LOT MORE THAN USUAL: NOT AT ALL
SUM OF ALL RESPONSES TO PHQ QUESTIONS 1-9: 3
2. FEELING DOWN, DEPRESSED, IRRITABLE, OR HOPELESS: SEVERAL DAYS
1. LITTLE INTEREST OR PLEASURE IN DOING THINGS: NOT AT ALL
4. FEELING TIRED OR HAVING LITTLE ENERGY: 1
9. THOUGHTS THAT YOU WOULD BE BETTER OFF DEAD, OR OF HURTING YOURSELF: NOT AT ALL
8. MOVING OR SPEAKING SO SLOWLY THAT OTHER PEOPLE COULD HAVE NOTICED. OR THE OPPOSITE, BEING SO FIGETY OR RESTLESS THAT YOU HAVE BEEN MOVING AROUND A LOT MORE THAN USUAL: NOT AT ALL
1. LITTLE INTEREST OR PLEASURE IN DOING THINGS: NOT AT ALL
6. FEELING BAD ABOUT YOURSELF - OR THAT YOU ARE A FAILURE OR HAVE LET YOURSELF OR YOUR FAMILY DOWN: NOT AT ALL
8. MOVING OR SPEAKING SO SLOWLY THAT OTHER PEOPLE COULD HAVE NOTICED. OR THE OPPOSITE, BEING SO FIGETY OR RESTLESS THAT YOU HAVE BEEN MOVING AROUND A LOT MORE THAN USUAL: 0
5. POOR APPETITE OR OVEREATING: 0
7. TROUBLE CONCENTRATING ON THINGS, SUCH AS READING THE NEWSPAPER OR WATCHING TELEVISION: 0
10. IF YOU CHECKED OFF ANY PROBLEMS, HOW DIFFICULT HAVE THESE PROBLEMS MADE IT FOR YOU TO DO YOUR WORK, TAKE CARE OF THINGS AT HOME, OR GET ALONG WITH OTHER PEOPLE: NOT DIFFICULT AT ALL
4. FEELING TIRED OR HAVING LITTLE ENERGY: SEVERAL DAYS
SUM OF ALL RESPONSES TO PHQ QUESTIONS 1-9: 3
7. TROUBLE CONCENTRATING ON THINGS, SUCH AS READING THE NEWSPAPER OR WATCHING TELEVISION: NOT AT ALL
1. LITTLE INTEREST OR PLEASURE IN DOING THINGS: NOT AT ALL
7. TROUBLE CONCENTRATING ON THINGS, SUCH AS READING THE NEWSPAPER OR WATCHING TELEVISION: NOT AT ALL
4. FEELING TIRED OR HAVING LITTLE ENERGY: SEVERAL DAYS
9. THOUGHTS THAT YOU WOULD BE BETTER OFF DEAD, OR OF HURTING YOURSELF: 0
SUM OF ALL RESPONSES TO PHQ QUESTIONS 1-9: 3
3. TROUBLE FALLING OR STAYING ASLEEP OR SLEEPING TOO MUCH: SEVERAL DAYS
SUM OF ALL RESPONSES TO PHQ QUESTIONS 1-9: 3
9. THOUGHTS THAT YOU WOULD BE BETTER OFF DEAD, OR OF HURTING YOURSELF: 0
7. TROUBLE CONCENTRATING ON THINGS, SUCH AS READING THE NEWSPAPER OR WATCHING TELEVISION: NOT AT ALL
4. FEELING TIRED OR HAVING LITTLE ENERGY: SEVERAL DAYS
4. FEELING TIRED OR HAVING LITTLE ENERGY: SEVERAL DAYS
2. FEELING DOWN, DEPRESSED, IRRITABLE, OR HOPELESS: 1
9. THOUGHTS THAT YOU WOULD BE BETTER OFF DEAD, OR OF HURTING YOURSELF: NOT AT ALL
7. TROUBLE CONCENTRATING ON THINGS, SUCH AS READING THE NEWSPAPER OR WATCHING TELEVISION: 0
9. THOUGHTS THAT YOU WOULD BE BETTER OFF DEAD, OR OF HURTING YOURSELF: NOT AT ALL
5. POOR APPETITE OR OVEREATING: NOT AT ALL
6. FEELING BAD ABOUT YOURSELF - OR THAT YOU ARE A FAILURE OR HAVE LET YOURSELF OR YOUR FAMILY DOWN: 0
4. FEELING TIRED OR HAVING LITTLE ENERGY: SEVERAL DAYS
8. MOVING OR SPEAKING SO SLOWLY THAT OTHER PEOPLE COULD HAVE NOTICED. OR THE OPPOSITE, BEING SO FIGETY OR RESTLESS THAT YOU HAVE BEEN MOVING AROUND A LOT MORE THAN USUAL: NOT AT ALL
8. MOVING OR SPEAKING SO SLOWLY THAT OTHER PEOPLE COULD HAVE NOTICED. OR THE OPPOSITE, BEING SO FIGETY OR RESTLESS THAT YOU HAVE BEEN MOVING AROUND A LOT MORE THAN USUAL: 0
1. LITTLE INTEREST OR PLEASURE IN DOING THINGS: 0
4. FEELING TIRED OR HAVING LITTLE ENERGY: SEVERAL DAYS
2. FEELING DOWN, DEPRESSED, IRRITABLE, OR HOPELESS: 1
4. FEELING TIRED OR HAVING LITTLE ENERGY: 1
7. TROUBLE CONCENTRATING ON THINGS, SUCH AS READING THE NEWSPAPER OR WATCHING TELEVISION: NOT AT ALL
6. FEELING BAD ABOUT YOURSELF - OR THAT YOU ARE A FAILURE OR HAVE LET YOURSELF OR YOUR FAMILY DOWN: 0
9. THOUGHTS THAT YOU WOULD BE BETTER OFF DEAD, OR OF HURTING YOURSELF: NOT AT ALL
6. FEELING BAD ABOUT YOURSELF - OR THAT YOU ARE A FAILURE OR HAVE LET YOURSELF OR YOUR FAMILY DOWN: NOT AT ALL
10. IF YOU CHECKED OFF ANY PROBLEMS, HOW DIFFICULT HAVE THESE PROBLEMS MADE IT FOR YOU TO DO YOUR WORK, TAKE CARE OF THINGS AT HOME, OR GET ALONG WITH OTHER PEOPLE: NOT DIFFICULT AT ALL
10. IF YOU CHECKED OFF ANY PROBLEMS, HOW DIFFICULT HAVE THESE PROBLEMS MADE IT FOR YOU TO DO YOUR WORK, TAKE CARE OF THINGS AT HOME, OR GET ALONG WITH OTHER PEOPLE: NOT DIFFICULT AT ALL
9. THOUGHTS THAT YOU WOULD BE BETTER OFF DEAD, OR OF HURTING YOURSELF: NOT AT ALL
5. POOR APPETITE OR OVEREATING: 0
3. TROUBLE FALLING OR STAYING ASLEEP OR SLEEPING TOO MUCH: SEVERAL DAYS
7. TROUBLE CONCENTRATING ON THINGS, SUCH AS READING THE NEWSPAPER OR WATCHING TELEVISION: NOT AT ALL
5. POOR APPETITE OR OVEREATING: 0
6. FEELING BAD ABOUT YOURSELF - OR THAT YOU ARE A FAILURE OR HAVE LET YOURSELF OR YOUR FAMILY DOWN: 0
9. THOUGHTS THAT YOU WOULD BE BETTER OFF DEAD, OR OF HURTING YOURSELF: NOT AT ALL
9. THOUGHTS THAT YOU WOULD BE BETTER OFF DEAD, OR OF HURTING YOURSELF: 0
5. POOR APPETITE OR OVEREATING: NOT AT ALL
1. LITTLE INTEREST OR PLEASURE IN DOING THINGS: NOT AT ALL
3. TROUBLE FALLING OR STAYING ASLEEP OR SLEEPING TOO MUCH: 1
5. POOR APPETITE OR OVEREATING: NOT AT ALL
10. IF YOU CHECKED OFF ANY PROBLEMS, HOW DIFFICULT HAVE THESE PROBLEMS MADE IT FOR YOU TO DO YOUR WORK, TAKE CARE OF THINGS AT HOME, OR GET ALONG WITH OTHER PEOPLE: NOT DIFFICULT AT ALL
6. FEELING BAD ABOUT YOURSELF - OR THAT YOU ARE A FAILURE OR HAVE LET YOURSELF OR YOUR FAMILY DOWN: NOT AT ALL
8. MOVING OR SPEAKING SO SLOWLY THAT OTHER PEOPLE COULD HAVE NOTICED. OR THE OPPOSITE, BEING SO FIGETY OR RESTLESS THAT YOU HAVE BEEN MOVING AROUND A LOT MORE THAN USUAL: NOT AT ALL
2. FEELING DOWN, DEPRESSED, IRRITABLE, OR HOPELESS: SEVERAL DAYS
1. LITTLE INTEREST OR PLEASURE IN DOING THINGS: 0
5. POOR APPETITE OR OVEREATING: NOT AT ALL
3. TROUBLE FALLING OR STAYING ASLEEP OR SLEEPING TOO MUCH: SEVERAL DAYS
SUM OF ALL RESPONSES TO PHQ QUESTIONS 1-9: 3
2. FEELING DOWN, DEPRESSED, IRRITABLE, OR HOPELESS: 1
2. FEELING DOWN, DEPRESSED OR HOPELESS: SEVERAL DAYS
3. TROUBLE FALLING OR STAYING ASLEEP OR SLEEPING TOO MUCH: SEVERAL DAYS
5. POOR APPETITE OR OVEREATING: NOT AT ALL
8. MOVING OR SPEAKING SO SLOWLY THAT OTHER PEOPLE COULD HAVE NOTICED. OR THE OPPOSITE, BEING SO FIGETY OR RESTLESS THAT YOU HAVE BEEN MOVING AROUND A LOT MORE THAN USUAL: NOT AT ALL
7. TROUBLE CONCENTRATING ON THINGS, SUCH AS READING THE NEWSPAPER OR WATCHING TELEVISION: NOT AT ALL
6. FEELING BAD ABOUT YOURSELF - OR THAT YOU ARE A FAILURE OR HAVE LET YOURSELF OR YOUR FAMILY DOWN: NOT AT ALL
3. TROUBLE FALLING OR STAYING ASLEEP OR SLEEPING TOO MUCH: 1
6. FEELING BAD ABOUT YOURSELF - OR THAT YOU ARE A FAILURE OR HAVE LET YOURSELF OR YOUR FAMILY DOWN: NOT AT ALL
6. FEELING BAD ABOUT YOURSELF - OR THAT YOU ARE A FAILURE OR HAVE LET YOURSELF OR YOUR FAMILY DOWN: NOT AT ALL
7. TROUBLE CONCENTRATING ON THINGS, SUCH AS READING THE NEWSPAPER OR WATCHING TELEVISION: 0
3. TROUBLE FALLING OR STAYING ASLEEP OR SLEEPING TOO MUCH: SEVERAL DAYS
1. LITTLE INTEREST OR PLEASURE IN DOING THINGS: 0
10. IF YOU CHECKED OFF ANY PROBLEMS, HOW DIFFICULT HAVE THESE PROBLEMS MADE IT FOR YOU TO DO YOUR WORK, TAKE CARE OF THINGS AT HOME, OR GET ALONG WITH OTHER PEOPLE: NOT DIFFICULT AT ALL
SUM OF ALL RESPONSES TO PHQ QUESTIONS 1-9: 3
1. LITTLE INTEREST OR PLEASURE IN DOING THINGS: NOT AT ALL
8. MOVING OR SPEAKING SO SLOWLY THAT OTHER PEOPLE COULD HAVE NOTICED. OR THE OPPOSITE, BEING SO FIGETY OR RESTLESS THAT YOU HAVE BEEN MOVING AROUND A LOT MORE THAN USUAL: NOT AT ALL
3. TROUBLE FALLING OR STAYING ASLEEP OR SLEEPING TOO MUCH: 1
8. MOVING OR SPEAKING SO SLOWLY THAT OTHER PEOPLE COULD HAVE NOTICED. OR THE OPPOSITE, BEING SO FIGETY OR RESTLESS THAT YOU HAVE BEEN MOVING AROUND A LOT MORE THAN USUAL: 0
10. IF YOU CHECKED OFF ANY PROBLEMS, HOW DIFFICULT HAVE THESE PROBLEMS MADE IT FOR YOU TO DO YOUR WORK, TAKE CARE OF THINGS AT HOME, OR GET ALONG WITH OTHER PEOPLE: NOT DIFFICULT AT ALL
1. LITTLE INTEREST OR PLEASURE IN DOING THINGS: NOT AT ALL
2. FEELING DOWN, DEPRESSED OR HOPELESS: SEVERAL DAYS
4. FEELING TIRED OR HAVING LITTLE ENERGY: 1
3. TROUBLE FALLING OR STAYING ASLEEP OR SLEEPING TOO MUCH: SEVERAL DAYS
2. FEELING DOWN, DEPRESSED OR HOPELESS: SEVERAL DAYS

## 2023-11-10 NOTE — TELEPHONE ENCOUNTER
Pt calling to speak with Dr. Suarez about her current sate as she is  breast cancer patient. Gloria rivera.

## 2023-11-13 ENCOUNTER — APPOINTMENT (OUTPATIENT)
Dept: HEMATOLOGY/ONCOLOGY | Facility: HOSPITAL | Age: 68
End: 2023-11-13
Payer: MEDICARE

## 2023-11-13 ENCOUNTER — INFUSION (OUTPATIENT)
Dept: HEMATOLOGY/ONCOLOGY | Facility: CLINIC | Age: 68
End: 2023-11-13
Payer: MEDICARE

## 2023-11-13 DIAGNOSIS — C50.912 INVASIVE LOBULAR CARCINOMA OF LEFT BREAST IN FEMALE (MULTI): ICD-10-CM

## 2023-11-13 LAB
ALBUMIN SERPL BCP-MCNC: 3.8 G/DL (ref 3.4–5)
ALP SERPL-CCNC: 90 U/L (ref 33–136)
ALT SERPL W P-5'-P-CCNC: 51 U/L (ref 7–45)
ANION GAP SERPL CALC-SCNC: 11 MMOL/L (ref 10–20)
AST SERPL W P-5'-P-CCNC: 29 U/L (ref 9–39)
BASOPHILS # BLD AUTO: 0.03 X10*3/UL (ref 0–0.1)
BASOPHILS NFR BLD AUTO: 1 %
BILIRUB SERPL-MCNC: 0.4 MG/DL (ref 0–1.2)
BUN SERPL-MCNC: 17 MG/DL (ref 6–23)
CALCIUM SERPL-MCNC: 8.9 MG/DL (ref 8.6–10.3)
CHLORIDE SERPL-SCNC: 100 MMOL/L (ref 98–107)
CO2 SERPL-SCNC: 28 MMOL/L (ref 21–32)
CREAT SERPL-MCNC: 0.78 MG/DL (ref 0.5–1.05)
EOSINOPHIL # BLD AUTO: 0.08 X10*3/UL (ref 0–0.7)
EOSINOPHIL NFR BLD AUTO: 2.7 %
ERYTHROCYTE [DISTWIDTH] IN BLOOD BY AUTOMATED COUNT: 15.3 % (ref 11.5–14.5)
GFR SERPL CREATININE-BSD FRML MDRD: 83 ML/MIN/1.73M*2
GLUCOSE SERPL-MCNC: 99 MG/DL (ref 74–99)
HCT VFR BLD AUTO: 33.2 % (ref 36–46)
HGB BLD-MCNC: 10.8 G/DL (ref 12–16)
IMM GRANULOCYTES # BLD AUTO: 0.04 X10*3/UL (ref 0–0.7)
IMM GRANULOCYTES NFR BLD AUTO: 1.4 % (ref 0–0.9)
LYMPHOCYTES # BLD AUTO: 1.1 X10*3/UL (ref 1.2–4.8)
LYMPHOCYTES NFR BLD AUTO: 37.5 %
MCH RBC QN AUTO: 28.8 PG (ref 26–34)
MCHC RBC AUTO-ENTMCNC: 32.5 G/DL (ref 32–36)
MCV RBC AUTO: 89 FL (ref 80–100)
MONOCYTES # BLD AUTO: 0.27 X10*3/UL (ref 0.1–1)
MONOCYTES NFR BLD AUTO: 9.2 %
NEUTROPHILS # BLD AUTO: 1.41 X10*3/UL (ref 1.2–7.7)
NEUTROPHILS NFR BLD AUTO: 48.2 %
NRBC BLD-RTO: ABNORMAL /100{WBCS}
PLATELET # BLD AUTO: 153 X10*3/UL (ref 150–450)
POTASSIUM SERPL-SCNC: 4 MMOL/L (ref 3.5–5.3)
PROT SERPL-MCNC: 6.6 G/DL (ref 6.4–8.2)
RBC # BLD AUTO: 3.75 X10*6/UL (ref 4–5.2)
SODIUM SERPL-SCNC: 135 MMOL/L (ref 136–145)
WBC # BLD AUTO: 2.9 X10*3/UL (ref 4.4–11.3)

## 2023-11-13 PROCEDURE — 96523 IRRIG DRUG DELIVERY DEVICE: CPT

## 2023-11-13 PROCEDURE — 84075 ASSAY ALKALINE PHOSPHATASE: CPT

## 2023-11-13 PROCEDURE — 85025 COMPLETE CBC W/AUTO DIFF WBC: CPT

## 2023-11-13 PROCEDURE — 36591 DRAW BLOOD OFF VENOUS DEVICE: CPT

## 2023-11-14 ENCOUNTER — INFUSION (OUTPATIENT)
Dept: HEMATOLOGY/ONCOLOGY | Facility: CLINIC | Age: 68
End: 2023-11-14
Payer: MEDICARE

## 2023-11-14 ENCOUNTER — OFFICE VISIT (OUTPATIENT)
Dept: HEMATOLOGY/ONCOLOGY | Facility: CLINIC | Age: 68
End: 2023-11-14
Payer: MEDICARE

## 2023-11-14 VITALS
SYSTOLIC BLOOD PRESSURE: 127 MMHG | HEART RATE: 95 BPM | DIASTOLIC BLOOD PRESSURE: 73 MMHG | RESPIRATION RATE: 16 BRPM | OXYGEN SATURATION: 95 % | BODY MASS INDEX: 23.87 KG/M2 | TEMPERATURE: 97.3 F | WEIGHT: 120.15 LBS

## 2023-11-14 DIAGNOSIS — C50.912 INVASIVE LOBULAR CARCINOMA OF LEFT BREAST IN FEMALE (MULTI): ICD-10-CM

## 2023-11-14 PROCEDURE — 1159F MED LIST DOCD IN RCRD: CPT | Performed by: STUDENT IN AN ORGANIZED HEALTH CARE EDUCATION/TRAINING PROGRAM

## 2023-11-14 PROCEDURE — 99214 OFFICE O/P EST MOD 30 MIN: CPT | Performed by: STUDENT IN AN ORGANIZED HEALTH CARE EDUCATION/TRAINING PROGRAM

## 2023-11-14 PROCEDURE — 2500000004 HC RX 250 GENERAL PHARMACY W/ HCPCS (ALT 636 FOR OP/ED): Performed by: STUDENT IN AN ORGANIZED HEALTH CARE EDUCATION/TRAINING PROGRAM

## 2023-11-14 PROCEDURE — 2500000004 HC RX 250 GENERAL PHARMACY W/ HCPCS (ALT 636 FOR OP/ED)

## 2023-11-14 PROCEDURE — 1036F TOBACCO NON-USER: CPT | Performed by: STUDENT IN AN ORGANIZED HEALTH CARE EDUCATION/TRAINING PROGRAM

## 2023-11-14 PROCEDURE — 1126F AMNT PAIN NOTED NONE PRSNT: CPT | Performed by: STUDENT IN AN ORGANIZED HEALTH CARE EDUCATION/TRAINING PROGRAM

## 2023-11-14 PROCEDURE — 96413 CHEMO IV INFUSION 1 HR: CPT

## 2023-11-14 PROCEDURE — 96375 TX/PRO/DX INJ NEW DRUG ADDON: CPT | Mod: INF

## 2023-11-14 PROCEDURE — 96417 CHEMO IV INFUS EACH ADDL SEQ: CPT

## 2023-11-14 RX ORDER — DIPHENHYDRAMINE HYDROCHLORIDE 50 MG/ML
50 INJECTION INTRAMUSCULAR; INTRAVENOUS ONCE
Status: COMPLETED | OUTPATIENT
Start: 2023-11-14 | End: 2023-11-14

## 2023-11-14 RX ORDER — PROCHLORPERAZINE MALEATE 10 MG
10 TABLET ORAL EVERY 6 HOURS PRN
Status: DISCONTINUED | OUTPATIENT
Start: 2023-11-14 | End: 2023-11-14 | Stop reason: HOSPADM

## 2023-11-14 RX ORDER — HEPARIN 100 UNIT/ML
SYRINGE INTRAVENOUS
Status: COMPLETED
Start: 2023-11-14 | End: 2023-11-14

## 2023-11-14 RX ORDER — FAMOTIDINE 10 MG/ML
20 INJECTION INTRAVENOUS ONCE AS NEEDED
Status: DISCONTINUED | OUTPATIENT
Start: 2023-11-14 | End: 2023-11-14 | Stop reason: HOSPADM

## 2023-11-14 RX ORDER — LOPERAMIDE HCL 1MG/7.5ML
2 LIQUID (ML) ORAL
COMMUNITY

## 2023-11-14 RX ORDER — EPINEPHRINE 0.3 MG/.3ML
0.3 INJECTION SUBCUTANEOUS EVERY 5 MIN PRN
Status: DISCONTINUED | OUTPATIENT
Start: 2023-11-14 | End: 2023-11-14 | Stop reason: HOSPADM

## 2023-11-14 RX ORDER — HEPARIN SODIUM,PORCINE/PF 10 UNIT/ML
50 SYRINGE (ML) INTRAVENOUS AS NEEDED
Status: CANCELLED | OUTPATIENT
Start: 2023-11-14

## 2023-11-14 RX ORDER — ALBUTEROL SULFATE 0.83 MG/ML
3 SOLUTION RESPIRATORY (INHALATION) AS NEEDED
Status: DISCONTINUED | OUTPATIENT
Start: 2023-11-14 | End: 2023-11-14 | Stop reason: HOSPADM

## 2023-11-14 RX ORDER — PROCHLORPERAZINE EDISYLATE 5 MG/ML
10 INJECTION INTRAMUSCULAR; INTRAVENOUS EVERY 6 HOURS PRN
Status: DISCONTINUED | OUTPATIENT
Start: 2023-11-14 | End: 2023-11-14 | Stop reason: HOSPADM

## 2023-11-14 RX ORDER — HEPARIN 100 UNIT/ML
500 SYRINGE INTRAVENOUS AS NEEDED
Status: CANCELLED | OUTPATIENT
Start: 2023-11-14

## 2023-11-14 RX ORDER — DEXAMETHASONE SODIUM PHOSPHATE 100 MG/10ML
10 INJECTION INTRAMUSCULAR; INTRAVENOUS ONCE
Status: COMPLETED | OUTPATIENT
Start: 2023-11-14 | End: 2023-11-14

## 2023-11-14 RX ORDER — FAMOTIDINE 10 MG/ML
20 INJECTION INTRAVENOUS ONCE
Status: COMPLETED | OUTPATIENT
Start: 2023-11-14 | End: 2023-11-14

## 2023-11-14 RX ORDER — DIPHENHYDRAMINE HYDROCHLORIDE 50 MG/ML
50 INJECTION INTRAMUSCULAR; INTRAVENOUS AS NEEDED
Status: DISCONTINUED | OUTPATIENT
Start: 2023-11-14 | End: 2023-11-14 | Stop reason: HOSPADM

## 2023-11-14 RX ADMIN — FAMOTIDINE 20 MG: 10 INJECTION INTRAVENOUS at 10:33

## 2023-11-14 RX ADMIN — PACLITAXEL 90 MG: 6 INJECTION, SOLUTION INTRAVENOUS at 11:53

## 2023-11-14 RX ADMIN — DEXAMETHASONE SODIUM PHOSPHATE 10 MG: 10 INJECTION INTRAMUSCULAR; INTRAVENOUS at 10:33

## 2023-11-14 RX ADMIN — HEPARIN 500 UNITS: 100 SYRINGE at 12:55

## 2023-11-14 RX ADMIN — TRASTUZUMAB-ANNS 111.3 MG: 150 INJECTION, POWDER, LYOPHILIZED, FOR SOLUTION INTRAVENOUS at 11:07

## 2023-11-14 RX ADMIN — DIPHENHYDRAMINE HYDROCHLORIDE 50 MG: 50 INJECTION INTRAMUSCULAR; INTRAVENOUS at 10:33

## 2023-11-14 ASSESSMENT — ENCOUNTER SYMPTOMS
OCCASIONAL FEELINGS OF UNSTEADINESS: 0
LOSS OF SENSATION IN FEET: 0
DEPRESSION: 0

## 2023-11-14 ASSESSMENT — PAIN SCALES - GENERAL: PAINLEVEL: 0-NO PAIN

## 2023-11-14 NOTE — PROGRESS NOTES
Patient tolerated medicine.  When she was flushing she states she felt some discomfort on the left side of her upper chest after walking to the bathroom.  Patient sat for about 15 minutes and stated that it was gone.  Did not have any other complaints.  Cautioned patient that if she has it again either at home or during next treatment let us know.

## 2023-11-14 NOTE — PROGRESS NOTES
Patient ID: Zina Hernandez is a 68 y.o. female.    The patient presents to clinic today for her history of breast cancer.     Cancer Staging   No matching staging information was found for the patient.      Diagnostic/Therapeutic History:    The patient presents to clinic today for her history of breast cancer.     Diagnostic/Therapeutic History:  Cancer History:          Breast         AJCC Edition: 8th (AJCC), Diagnosis Date: 30-Aug-2023, IA, pT1c pN0 cM0 G2     Treatment Synopsis:    - On 6/14/2023 screening mammogram she had 2 adjacent irregular spiculated masses in the upper outer left breast at middle depth.  No suspicious masses or calcification  in the right breast.  BI-RADS Category 0     -On 6/28/2023 she had targeted ultrasound that showed at 1:00, 4 cm from the nipple an irregular hypoechoic mass measuring 3 x 5 x 4 mm this corresponds to the mammographic finding.  Left axilla showed 4 morphologically normal lymph nodes without lymphadenopathy      -On 7/14/2023 she had left breast mass ultrasound guided core needle  invasive lobular carcinoma, grade 2 with  ER 95%, PA 20%, HER2 positive 3+, also had lobular carcinoma in situ     -On 8/30/2023 Dr. Blanchard performed left partial mastectomy with sentinel lymph node biopsy (0/1) 2 foci of cancer largest 1 measuring 13 mm, second 1 measuring 7 mm, margins were negative final stage [id number]     History of Present Illness (HPI)/Interval History:    Did have intermittent headaches that resolved.  Diarrhea- once a day - no abdominal pain.  Appetite okay  No chest pain, no shortness of breath, no nausea, no vomiting   No urinary symptoms  No numbness or tingling        14 point review of system otherwise unremarkable     PMH: as above     Meds: allergies reviewed      Reproductive history: Menarche at 13, AFLB: 27,  menopause at 50, no HRT     Family history maternal grandmother with uterine cancer in her 60s    She denies any fevers or chills.      Review of  Systems:  14-point ROS otherwise negative, as per HPI.    Past Medical History:   Diagnosis Date    Cancer (CMS/HCC)     Migraine        Past Surgical History:   Procedure Laterality Date    BREAST LUMPECTOMY         Social History     Socioeconomic History    Marital status:      Spouse name: Not on file    Number of children: Not on file    Years of education: Not on file    Highest education level: Not on file   Occupational History    Not on file   Tobacco Use    Smoking status: Never    Smokeless tobacco: Never   Substance and Sexual Activity    Alcohol use: Never    Drug use: Never    Sexual activity: Not on file   Other Topics Concern    Not on file   Social History Narrative    Not on file     Social Determinants of Health     Financial Resource Strain: Not on file   Food Insecurity: Not on file   Transportation Needs: Not on file   Physical Activity: Not on file   Stress: Not on file   Social Connections: Not on file   Intimate Partner Violence: Not on file   Housing Stability: Not on file       Allergies   Allergen Reactions    Tamiflu [Oseltamivir] Unknown         Current Outpatient Medications:     dexAMETHasone 0.5 mg/5 mL oral liquid, Take 40 mL (4 mg) by mouth 2 times a day for 10 days. (Patient not taking: Reported on 10/31/2023), Disp: 800 mL, Rfl: 0    loperamide (Imodium A-D) 1 mg/7.5 mL liquid, Take 15 mL (2 mg) by mouth., Disp: , Rfl:     ondansetron ODT (Zofran-ODT) 8 mg disintegrating tablet, Take 1 tablet (8 mg) by mouth every 8 hours if needed for nausea or vomiting. (Patient not taking: Reported on 11/14/2023), Disp: 20 tablet, Rfl: 1     Objective    BSA: 1.51 meters squared  /73 (BP Location: Right arm, Patient Position: Sitting)   Pulse 95   Temp 36.3 °C (97.3 °F) (Temporal)   Resp 16   Wt 54.5 kg (120 lb 2.4 oz)   SpO2 95%   BMI 23.87 kg/m²     ECOG Performance Status: 0    Physical Exam    Constitutional: Well developed, awake/alert/oriented  x3, no distress, alert  and cooperative   Eyes: PERRL, EOMI, clear sclera   ENMT: mucous membranes moist, no apparent injury,  no lesions seen   Head/Neck: Neck supple, no apparent injury, thyroid  without mass or tenderness, No JVD, trachea midline, no bruits   Respiratory/Thorax: Patent airways, CTAB, normal  breath sounds with good chest expansion, thorax symmetric   Cardiovascular: Regular, rate and rhythm, no murmurs,  2+ equal pulses of the extremities, normal S 1and S 2   Gastrointestinal: Nondistended, soft, non-tender,  no rebound tenderness or guarding, no masses palpable, no organomegaly, +BS, no bruits   Extremities: normal extremities, no cyanosis edema,  contusions or wounds, no clubbing   Breast: Left surgical incision healing well, +seroma,  no new nodules or lymphadenopathy.  No right breast nodules or lymphadenopathy        Laboratory Data:  Lab Results   Component Value Date    WBC 2.9 (L) 11/13/2023    HGB 10.8 (L) 11/13/2023    HCT 33.2 (L) 11/13/2023    MCV 89 11/13/2023     11/13/2023       Chemistry    Lab Results   Component Value Date/Time     (L) 11/13/2023 1248    K 4.0 11/13/2023 1248     11/13/2023 1248    CO2 28 11/13/2023 1248    BUN 17 11/13/2023 1248    CREATININE 0.78 11/13/2023 1248    Lab Results   Component Value Date/Time    CALCIUM 8.9 11/13/2023 1248    ALKPHOS 90 11/13/2023 1248    AST 29 11/13/2023 1248    ALT 51 (H) 11/13/2023 1248    BILITOT 0.4 11/13/2023 1248             Radiology:  Onco-Echo Complete (Strain & 3D)      [facility], [address] [id number] [phone] and                                  Fax [phone]    TRANSTHORACIC ECHOCARDIOGRAM REPORT       Patient Name:      HUNTER VELA     Halle Physician:    [id number] John Garcia DO  Study Date:        10/13/2023           Ordering Provider:    [id number] SANTOYO                                                                 CHUCK  MRN/PID:           [id number]             Fellow:  Accession#:        [id number]         Nurse:  Date of Birth/Age: 1955 / 68 years Sonographer:          FAY Spivey RDCS  Gender:            F                    Additional Staff:  Height:            149.86 cm            Admit Date:           10/13/2023  Weight:            55.34 kg             Admission Status:     Outpatient  BSA:               1.49 m2              Encounter#:           [id number]                                          Department Location:  [facility]  Blood Pressure: 120 /83 mmHg    Study Type:    ONCO-ECHO COMPLETE (STRAIN AND 3D)  Diagnosis/ICD: Other long term (current) drug therapy-Z79.899; Encounter for                 monitoring cardiotoxic drug therapy-Z51.81; Malignant neoplasm of                 unspecified site of left female breast-C50.912  Indication:    Cardiotoxic chemo, breast cancer (L)  CPT Code:      Echo Complete w Full Doppler-67606; Myocardial Strain                 Imaging-27339   Study Detail: The following Echo studies were performed: 2D, M-Mode, Doppler,                color flow and Strain.       PHYSICIAN INTERPRETATION:  Left Ventricle: The left ventricular systolic function is normal, with an estimated ejection fraction of 65-70%. There are no regional wall motion abnormalities. The left ventricular cavity size is normal. Spectral Doppler shows a normal pattern of left ventricular diastolic filling. Strain values are normal, which imply normal myocardial function.  Left Atrium: The left atrium is normal in size.  Right Ventricle: The right ventricle is normal in size. There is normal right ventricular global systolic function.  Right Atrium: The right atrium is normal in size.  Aortic Valve: The aortic valve is trileaflet. There is mild aortic valve thickening. There is trivial aortic valve regurgitation. The peak instantaneous  gradient of the aortic valve is 5.6 mmHg. The mean gradient of the aortic valve is 2.7 mmHg.  Mitral Valve: The mitral valve is mildly thickened. There is mild mitral valve regurgitation.  Tricuspid Valve: The tricuspid valve is structurally normal. There is trace to mild tricuspid regurgitation. The Doppler estimated RVSP is within normal limits at 18.5 mmHg.  Pulmonic Valve: The pulmonic valve is structurally normal. There is mild pulmonic valve regurgitation.  Pericardium: There is no pericardial effusion noted.  Aorta: The aortic root is normal.  Pulmonary Artery: The main pulmonary artery is normal in size, and position, with normal bifurcation into the left and right pulmonary arteries.  Systemic Veins: The inferior vena cava size appears small.     Onco-Cardiology Measurements:  Current Measurements  2D EF (Biplane)                     67%  Global Longitudinal Strain (GLS) -20.7%    GLS Tracking Quality: Fair       CONCLUSIONS:   1. Left ventricular systolic function is normal with a 65-70% estimated ejection fraction.   2. RVSP within normal limits.   3. Strain values are normal, which imply normal myocardial function.    QUANTITATIVE DATA SUMMARY:  2D MEASUREMENTS:                           Normal Ranges:  LAs:           3.06 cm   (2.7-4.0cm)  RVIDd:         2.64 cm   (0.9-3.6cm)  IVSd:          0.93 cm   (0.6-1.1cm)  LVPWd:         0.79 cm   (0.6-1.1cm)  LVIDd:         3.82 cm   (3.9-5.9cm)  LVIDs:         2.51 cm  LV Mass Index: 63.9 g/m2  LV % FS        34.3 %    LA VOLUME:                              Normal Ranges:  LA Area A4C:     14.4 cm2  LA Area A2C:     14.3 cm2  LA Volume Index: 27.1 ml/m2  LA Vol A4C:      39.1 ml  LA Vol A2C:      32.1 ml    RA VOLUME BY A/L METHOD:                        Normal Ranges:  RA Area A4C: 11.9 cm2    M-MODE MEASUREMENTS:                   Normal Ranges:  Ao Root: 3.40 cm (2.0-3.7cm)  AoV Exc: 1.86 cm (1.5-2.5cm)    AORTA MEASUREMENTS:                     Normal  Ranges:  AoV Exc:   1.86 cm (1.5-2.5cm)  Asc Ao, d: 3.00 cm (2.1-3.4cm)    LV SYSTOLIC FUNCTION BY 2D PLANIMETRY (MOD):                      Normal Ranges:  EF-A4C View: 66.2 % (>=55%)  EF-A2C View: 65.6 %  EF-Biplane:  67.0 %    LV DIASTOLIC FUNCTION:                         Normal Ranges:  MV Peak E:    0.93 m/s (0.7-1.2 m/s)  MV Peak A:    0.75 m/s (0.42-0.7 m/s)  E/A Ratio:    1.24     (1.0-2.2)  MV lateral e' 0.11 m/s  MV medial e'  0.10 m/s    MITRAL VALVE:                  Normal Ranges:  MV DT: 197 msec (150-240msec)    AORTIC VALVE:                                    Normal Ranges:  AoV Vmax:                1.18 m/s (<=1.7m/s)  AoV Peak P.6 mmHg (<20mmHg)  AoV Mean P.7 mmHg (1.7-11.5mmHg)  LVOT Max Steve:            0.83 m/s (<=1.1m/s)  AoV VTI:                 23.52 cm (18-25cm)  LVOT VTI:                16.63 cm  LVOT Diameter:           1.95 cm  (1.8-2.4cm)  AoV Area, VTI:           2.12 cm2 (2.5-5.5cm2)  AoV Area,Vmax:           2.11 cm2 (2.5-4.5cm2)  AoV Dimensionless Index: 0.71       RIGHT VENTRICLE:  RV Basal 3.00 cm  RV Mid   2.60 cm  RV Major 5.3 cm  TAPSE:   17.0 mm  RV s'    0.13 m/s    TRICUSPID VALVE/RVSP:                              Normal Ranges:  Peak TR Velocity: 1.97 m/s  RV Syst Pressure: 18.5 mmHg (< 30mmHg)  IVC Diam:         1.10 cm    AORTA:  Asc Ao Diam 3.00 cm       [id number] John Garcia DO  Electronically signed on 10/13/2023 at 5:05:31 PM       ** Final **       BI mammo bilateral screening tomosynthesis 2023    Narrative  Interpreted By:  ENZO MANUEL MD  MRN: [id number]  Patient Name: HUNTER VELA    STUDY:  DIGITAL MAMM SCREENING W/ RUBI;  2023 8:00 am    ACCESSION NUMBER(S):  [id number]    ORDERING CLINICIAN:  KATIE HINOJOSA    INDICATION:  Screening.    COMPARISON:  2021, 2020, 2019    FINDINGS:  2D and tomosynthesis images were reviewed at 1 mm slice thickness.    There are areas of scattered  fibroglandular tissue.  There are 2  adjacent irregular spiculated equal density masses in the upper outer  left breast at middle depth. No suspicious masses or calcifications  are identified in the right breast.    Impression  No mammographic evidence of malignancy in the right breast. Left  breast mass x2.    BI-RADS CATEGORY:    Category: 0 - Incomplete; Need Additional Imaging Evaluation and/or  Prior Mammograms for Comparison.  Recommendation: Ultrasound Recommended.    For any future breast imaging appointments, please call [phone]).    Patient letter sent SADEVAL          Assessment/Plan:     67-year-old female previously healthy found to have on screening mammogram 2 adjacent irregular spiculated masses in the upper outer left breast at middle depth with  US showing a 5mm mass with left axilla showing 4 negative LN s/p  guided core needle  invasive lobular carcinoma, grade 2 with  ER 95%, NH 20%, HER2 positive 3+, also had lobular carcinoma in situ. Dr. Blanchard performed left partial mastectomy with sentinel  lymph node biopsy (0/1) 2 foci of cancer largest 1 measuring 13 mm, second 1 measuring 7 mm, margins were negative final stage PT1CN0. She is presenting to discuss adjuvant treatment options.      I reviewed with her the events that led to her diagnosis of breast cancer. We reviewed all the procedures and diagnostic imaging she underwent thus far. I discussed the features of her breast cancer that include the size, grade, lymph node status and  hormone receptor/ her2-ying status.     Based on APT trial recommended to proceed with THx12 weeks followed by radiation followed by hormonal therapy and herceptin to complete for a total of 1 year     Based on the updated 10 year results of the APT trial published in the lancet in March of this year:      410 patients were enrolled and 406 were given adjuvant paclitaxel and trastuzumab and included in the analysis. Mean age at enrolment was 55 years  (SD 10·5), 405 (99·8%) of 406 patients were female and one (0·2%) was male, 350 (86·2%) were  White, 28 (6·9%) were Black or , and 272 (67·0%) had hormone receptor-positive disease. After a median follow-up of 10·8 years (IQR 7·1-11·4), among 406 patients included in the analysis population, we observed 31  invasive disease-free survival events, of which six (19·4%) were locoregional ipsilateral recurrences, nine (29·0%) were new contralateral breast cancers, six (19·4%) were distant recurrences, and ten (32·3%) were all-cause deaths.  10-year invasive disease-free survival was 91·3% (95% CI 88·3-94·4), 10-year recurrence-free interval was 96·3% (95% CI 94·3-98·3), 10-year overall survival was 94·3% (95% CI 91·8-96·8), and 10-year breast  cancer-specific survival was 98·8% (95% CI 97·6-100)       Disposition.  - Echocardiogram looked good- referral to Onc-cards in [place]  - proceed with with week 5 today  - RTC on 12/12/2023        Cony Amin MD  Hematology and Medical Oncology  [facility]

## 2023-11-20 ENCOUNTER — APPOINTMENT (OUTPATIENT)
Dept: HEMATOLOGY/ONCOLOGY | Facility: CLINIC | Age: 68
End: 2023-11-20
Payer: MEDICARE

## 2023-11-21 ENCOUNTER — INFUSION (OUTPATIENT)
Dept: HEMATOLOGY/ONCOLOGY | Facility: CLINIC | Age: 68
End: 2023-11-21
Payer: MEDICARE

## 2023-11-21 ENCOUNTER — APPOINTMENT (OUTPATIENT)
Dept: HEMATOLOGY/ONCOLOGY | Facility: CLINIC | Age: 68
End: 2023-11-21
Payer: MEDICARE

## 2023-11-21 VITALS
DIASTOLIC BLOOD PRESSURE: 76 MMHG | HEART RATE: 108 BPM | TEMPERATURE: 96.8 F | SYSTOLIC BLOOD PRESSURE: 151 MMHG | WEIGHT: 122.4 LBS | RESPIRATION RATE: 18 BRPM | BODY MASS INDEX: 24.32 KG/M2 | OXYGEN SATURATION: 98 %

## 2023-11-21 DIAGNOSIS — C50.912 INVASIVE LOBULAR CARCINOMA OF LEFT BREAST IN FEMALE (MULTI): ICD-10-CM

## 2023-11-21 LAB
ALBUMIN SERPL BCP-MCNC: 3.7 G/DL (ref 3.4–5)
ALP SERPL-CCNC: 96 U/L (ref 33–136)
ALT SERPL W P-5'-P-CCNC: 33 U/L (ref 7–45)
ANION GAP SERPL CALC-SCNC: 11 MMOL/L (ref 10–20)
AST SERPL W P-5'-P-CCNC: 22 U/L (ref 9–39)
BASOPHILS # BLD MANUAL: 0.04 X10*3/UL (ref 0–0.1)
BASOPHILS NFR BLD MANUAL: 1 %
BILIRUB SERPL-MCNC: 0.3 MG/DL (ref 0–1.2)
BUN SERPL-MCNC: 24 MG/DL (ref 6–23)
CALCIUM SERPL-MCNC: 8.7 MG/DL (ref 8.6–10.3)
CHLORIDE SERPL-SCNC: 100 MMOL/L (ref 98–107)
CO2 SERPL-SCNC: 28 MMOL/L (ref 21–32)
CREAT SERPL-MCNC: 0.83 MG/DL (ref 0.5–1.05)
EOSINOPHIL # BLD MANUAL: 0.04 X10*3/UL (ref 0–0.7)
EOSINOPHIL NFR BLD MANUAL: 1 %
ERYTHROCYTE [DISTWIDTH] IN BLOOD BY AUTOMATED COUNT: 16 % (ref 11.5–14.5)
GFR SERPL CREATININE-BSD FRML MDRD: 77 ML/MIN/1.73M*2
GLUCOSE SERPL-MCNC: 111 MG/DL (ref 74–99)
HCT VFR BLD AUTO: 32.2 % (ref 36–46)
HGB BLD-MCNC: 10.4 G/DL (ref 12–16)
IMM GRANULOCYTES # BLD AUTO: 0.19 X10*3/UL (ref 0–0.7)
IMM GRANULOCYTES NFR BLD AUTO: 4.5 % (ref 0–0.9)
LYMPHOCYTES # BLD MANUAL: 1.18 X10*3/UL (ref 1.2–4.8)
LYMPHOCYTES NFR BLD MANUAL: 28 %
MCH RBC QN AUTO: 29 PG (ref 26–34)
MCHC RBC AUTO-ENTMCNC: 32.3 G/DL (ref 32–36)
MCV RBC AUTO: 90 FL (ref 80–100)
METAMYELOCYTES # BLD MANUAL: 0.13 X10*3/UL
METAMYELOCYTES NFR BLD MANUAL: 3 %
MONOCYTES # BLD MANUAL: 0.42 X10*3/UL (ref 0.1–1)
MONOCYTES NFR BLD MANUAL: 10 %
MYELOCYTES # BLD MANUAL: 0.04 X10*3/UL
MYELOCYTES NFR BLD MANUAL: 1 %
NEUTROPHILS # BLD MANUAL: 2.31 X10*3/UL (ref 1.2–7.7)
NEUTS BAND # BLD MANUAL: 0.67 X10*3/UL (ref 0–0.7)
NEUTS BAND NFR BLD MANUAL: 16 %
NEUTS SEG # BLD MANUAL: 1.64 X10*3/UL (ref 1.2–7)
NEUTS SEG NFR BLD MANUAL: 39 %
NRBC BLD-RTO: ABNORMAL /100{WBCS}
OVALOCYTES BLD QL SMEAR: ABNORMAL
PLATELET # BLD AUTO: 305 X10*3/UL (ref 150–450)
POLYCHROMASIA BLD QL SMEAR: ABNORMAL
POTASSIUM SERPL-SCNC: 3.7 MMOL/L (ref 3.5–5.3)
PROT SERPL-MCNC: 6.6 G/DL (ref 6.4–8.2)
RBC # BLD AUTO: 3.59 X10*6/UL (ref 4–5.2)
RBC MORPH BLD: ABNORMAL
SODIUM SERPL-SCNC: 135 MMOL/L (ref 136–145)
TOTAL CELLS COUNTED BLD: 100
VARIANT LYMPHS # BLD MANUAL: 0.04 X10*3/UL (ref 0–0.5)
VARIANT LYMPHS NFR BLD: 1 %
WBC # BLD AUTO: 4.2 X10*3/UL (ref 4.4–11.3)

## 2023-11-21 PROCEDURE — 96375 TX/PRO/DX INJ NEW DRUG ADDON: CPT | Mod: INF

## 2023-11-21 PROCEDURE — 2500000004 HC RX 250 GENERAL PHARMACY W/ HCPCS (ALT 636 FOR OP/ED): Performed by: STUDENT IN AN ORGANIZED HEALTH CARE EDUCATION/TRAINING PROGRAM

## 2023-11-21 PROCEDURE — 80053 COMPREHEN METABOLIC PANEL: CPT

## 2023-11-21 PROCEDURE — 96417 CHEMO IV INFUS EACH ADDL SEQ: CPT

## 2023-11-21 PROCEDURE — 85027 COMPLETE CBC AUTOMATED: CPT

## 2023-11-21 PROCEDURE — 85007 BL SMEAR W/DIFF WBC COUNT: CPT

## 2023-11-21 PROCEDURE — 96413 CHEMO IV INFUSION 1 HR: CPT

## 2023-11-21 RX ORDER — HEPARIN SODIUM,PORCINE/PF 10 UNIT/ML
50 SYRINGE (ML) INTRAVENOUS AS NEEDED
Status: CANCELLED | OUTPATIENT
Start: 2023-11-21

## 2023-11-21 RX ORDER — ALBUTEROL SULFATE 0.83 MG/ML
3 SOLUTION RESPIRATORY (INHALATION) AS NEEDED
Status: DISCONTINUED | OUTPATIENT
Start: 2023-11-21 | End: 2023-11-21 | Stop reason: HOSPADM

## 2023-11-21 RX ORDER — DEXAMETHASONE 0.5 MG/5ML
8 ELIXIR ORAL 2 TIMES DAILY
Qty: 1600 ML | Refills: 0 | Status: SHIPPED | OUTPATIENT
Start: 2023-11-21 | End: 2023-11-22 | Stop reason: WASHOUT

## 2023-11-21 RX ORDER — PROCHLORPERAZINE EDISYLATE 5 MG/ML
10 INJECTION INTRAMUSCULAR; INTRAVENOUS EVERY 6 HOURS PRN
Status: DISCONTINUED | OUTPATIENT
Start: 2023-11-21 | End: 2023-11-21 | Stop reason: HOSPADM

## 2023-11-21 RX ORDER — EPINEPHRINE 0.3 MG/.3ML
0.3 INJECTION SUBCUTANEOUS EVERY 5 MIN PRN
Status: DISCONTINUED | OUTPATIENT
Start: 2023-11-21 | End: 2023-11-21 | Stop reason: HOSPADM

## 2023-11-21 RX ORDER — DIPHENHYDRAMINE HCL 50 MG
50 CAPSULE ORAL ONCE
Status: CANCELLED
Start: 2023-12-05

## 2023-11-21 RX ORDER — DIPHENHYDRAMINE HYDROCHLORIDE 50 MG/ML
50 INJECTION INTRAMUSCULAR; INTRAVENOUS AS NEEDED
Status: DISCONTINUED | OUTPATIENT
Start: 2023-11-21 | End: 2023-11-21 | Stop reason: HOSPADM

## 2023-11-21 RX ORDER — DIPHENHYDRAMINE HYDROCHLORIDE 50 MG/ML
50 INJECTION, SOLUTION INTRAMUSCULAR; INTRAVENOUS ONCE
Status: CANCELLED
Start: 2023-11-27 | End: 2023-11-27

## 2023-11-21 RX ORDER — DIPHENHYDRAMINE HCL 50 MG
50 CAPSULE ORAL ONCE
Status: CANCELLED
Start: 2023-12-04

## 2023-11-21 RX ORDER — DEXAMETHASONE SODIUM PHOSPHATE 100 MG/10ML
10 INJECTION INTRAMUSCULAR; INTRAVENOUS ONCE
Status: COMPLETED | OUTPATIENT
Start: 2023-11-21 | End: 2023-11-21

## 2023-11-21 RX ORDER — DIPHENHYDRAMINE HYDROCHLORIDE 50 MG/ML
50 INJECTION, SOLUTION INTRAMUSCULAR; INTRAVENOUS ONCE
Status: CANCELLED
Start: 2023-12-11

## 2023-11-21 RX ORDER — DIPHENHYDRAMINE HYDROCHLORIDE 50 MG/ML
50 INJECTION, SOLUTION INTRAMUSCULAR; INTRAVENOUS ONCE
Status: CANCELLED
Start: 2023-12-04

## 2023-11-21 RX ORDER — FAMOTIDINE 10 MG/ML
20 INJECTION INTRAVENOUS ONCE AS NEEDED
Status: COMPLETED | OUTPATIENT
Start: 2023-11-21 | End: 2023-11-21

## 2023-11-21 RX ORDER — FAMOTIDINE 10 MG/ML
20 INJECTION INTRAVENOUS ONCE
Status: COMPLETED | OUTPATIENT
Start: 2023-11-21 | End: 2023-11-21

## 2023-11-21 RX ORDER — DIPHENHYDRAMINE HYDROCHLORIDE 50 MG/ML
50 INJECTION, SOLUTION INTRAMUSCULAR; INTRAVENOUS ONCE
Status: CANCELLED
Start: 2023-11-28

## 2023-11-21 RX ORDER — DIPHENHYDRAMINE HYDROCHLORIDE 50 MG/ML
50 INJECTION, SOLUTION INTRAMUSCULAR; INTRAVENOUS ONCE
Status: CANCELLED
Start: 2023-12-05

## 2023-11-21 RX ORDER — DIPHENHYDRAMINE HCL 50 MG
50 CAPSULE ORAL ONCE
Status: CANCELLED
Start: 2023-11-21

## 2023-11-21 RX ORDER — DIPHENHYDRAMINE HCL 50 MG
50 CAPSULE ORAL ONCE
Status: CANCELLED
Start: 2023-12-11

## 2023-11-21 RX ORDER — DIPHENHYDRAMINE HYDROCHLORIDE 50 MG/ML
50 INJECTION INTRAMUSCULAR; INTRAVENOUS ONCE
Status: COMPLETED | OUTPATIENT
Start: 2023-11-21 | End: 2023-11-21

## 2023-11-21 RX ORDER — HEPARIN 100 UNIT/ML
500 SYRINGE INTRAVENOUS AS NEEDED
Status: DISCONTINUED | OUTPATIENT
Start: 2023-11-21 | End: 2023-11-21 | Stop reason: HOSPADM

## 2023-11-21 RX ORDER — PROCHLORPERAZINE MALEATE 10 MG
10 TABLET ORAL EVERY 6 HOURS PRN
Status: DISCONTINUED | OUTPATIENT
Start: 2023-11-21 | End: 2023-11-21 | Stop reason: HOSPADM

## 2023-11-21 RX ORDER — DEXAMETHASONE 0.5 MG/5ML
4 ELIXIR ORAL EVERY 12 HOURS SCHEDULED
OUTPATIENT
Start: 2023-11-21

## 2023-11-21 RX ORDER — HEPARIN 100 UNIT/ML
500 SYRINGE INTRAVENOUS AS NEEDED
Status: CANCELLED | OUTPATIENT
Start: 2023-11-21

## 2023-11-21 RX ADMIN — FAMOTIDINE 20 MG: 10 INJECTION INTRAVENOUS at 12:18

## 2023-11-21 RX ADMIN — DIPHENHYDRAMINE HYDROCHLORIDE 50 MG: 50 INJECTION INTRAMUSCULAR; INTRAVENOUS at 12:21

## 2023-11-21 RX ADMIN — HEPARIN 500 UNITS: 100 SYRINGE at 15:25

## 2023-11-21 RX ADMIN — DEXAMETHASONE SODIUM PHOSPHATE 10 MG: 10 INJECTION INTRAMUSCULAR; INTRAVENOUS at 12:19

## 2023-11-21 RX ADMIN — TRASTUZUMAB-ANNS 111 MG: 150 INJECTION, POWDER, LYOPHILIZED, FOR SOLUTION INTRAVENOUS at 13:15

## 2023-11-21 RX ADMIN — METHYLPREDNISOLONE SODIUM SUCCINATE 40 MG: 40 INJECTION, POWDER, FOR SOLUTION INTRAMUSCULAR; INTRAVENOUS at 15:06

## 2023-11-21 RX ADMIN — FAMOTIDINE 20 MG: 10 INJECTION INTRAVENOUS at 15:07

## 2023-11-21 RX ADMIN — PACLITAXEL 90 MG: 6 INJECTION, SOLUTION INTRAVENOUS at 13:56

## 2023-11-21 ASSESSMENT — PATIENT HEALTH QUESTIONNAIRE - PHQ9
1. LITTLE INTEREST OR PLEASURE IN DOING THINGS: 0
4. FEELING TIRED OR HAVING LITTLE ENERGY: SEVERAL DAYS
1. LITTLE INTEREST OR PLEASURE IN DOING THINGS: NOT AT ALL
8. MOVING OR SPEAKING SO SLOWLY THAT OTHER PEOPLE COULD HAVE NOTICED. OR THE OPPOSITE, BEING SO FIGETY OR RESTLESS THAT YOU HAVE BEEN MOVING AROUND A LOT MORE THAN USUAL: NOT AT ALL
8. MOVING OR SPEAKING SO SLOWLY THAT OTHER PEOPLE COULD HAVE NOTICED. OR THE OPPOSITE, BEING SO FIGETY OR RESTLESS THAT YOU HAVE BEEN MOVING AROUND A LOT MORE THAN USUAL: 0
3. TROUBLE FALLING OR STAYING ASLEEP OR SLEEPING TOO MUCH: NOT AT ALL
8. MOVING OR SPEAKING SO SLOWLY THAT OTHER PEOPLE COULD HAVE NOTICED. OR THE OPPOSITE, BEING SO FIGETY OR RESTLESS THAT YOU HAVE BEEN MOVING AROUND A LOT MORE THAN USUAL: NOT AT ALL
10. IF YOU CHECKED OFF ANY PROBLEMS, HOW DIFFICULT HAVE THESE PROBLEMS MADE IT FOR YOU TO DO YOUR WORK, TAKE CARE OF THINGS AT HOME, OR GET ALONG WITH OTHER PEOPLE: NOT DIFFICULT AT ALL
SUM OF ALL RESPONSES TO PHQ QUESTIONS 1-9: 3
4. FEELING TIRED OR HAVING LITTLE ENERGY: SEVERAL DAYS
9. THOUGHTS THAT YOU WOULD BE BETTER OFF DEAD, OR OF HURTING YOURSELF: 0
10. IF YOU CHECKED OFF ANY PROBLEMS, HOW DIFFICULT HAVE THESE PROBLEMS MADE IT FOR YOU TO DO YOUR WORK, TAKE CARE OF THINGS AT HOME, OR GET ALONG WITH OTHER PEOPLE: NOT DIFFICULT AT ALL
7. TROUBLE CONCENTRATING ON THINGS, SUCH AS READING THE NEWSPAPER OR WATCHING TELEVISION: NOT AT ALL
9. THOUGHTS THAT YOU WOULD BE BETTER OFF DEAD, OR OF HURTING YOURSELF: NOT AT ALL
5. POOR APPETITE OR OVEREATING: 1
7. TROUBLE CONCENTRATING ON THINGS, SUCH AS READING THE NEWSPAPER OR WATCHING TELEVISION: NOT AT ALL
6. FEELING BAD ABOUT YOURSELF - OR THAT YOU ARE A FAILURE OR HAVE LET YOURSELF OR YOUR FAMILY DOWN: NOT AT ALL
2. FEELING DOWN, DEPRESSED, IRRITABLE, OR HOPELESS: SEVERAL DAYS
7. TROUBLE CONCENTRATING ON THINGS, SUCH AS READING THE NEWSPAPER OR WATCHING TELEVISION: NOT AT ALL
2. FEELING DOWN, DEPRESSED OR HOPELESS: SEVERAL DAYS
8. MOVING OR SPEAKING SO SLOWLY THAT OTHER PEOPLE COULD HAVE NOTICED. OR THE OPPOSITE, BEING SO FIGETY OR RESTLESS THAT YOU HAVE BEEN MOVING AROUND A LOT MORE THAN USUAL: NOT AT ALL
3. TROUBLE FALLING OR STAYING ASLEEP OR SLEEPING TOO MUCH: 0
SUM OF ALL RESPONSES TO PHQ QUESTIONS 1-9: 3
9. THOUGHTS THAT YOU WOULD BE BETTER OFF DEAD, OR OF HURTING YOURSELF: NOT AT ALL
5. POOR APPETITE OR OVEREATING: SEVERAL DAYS
5. POOR APPETITE OR OVEREATING: 1
5. POOR APPETITE OR OVEREATING: SEVERAL DAYS
3. TROUBLE FALLING OR STAYING ASLEEP OR SLEEPING TOO MUCH: 0
1. LITTLE INTEREST OR PLEASURE IN DOING THINGS: NOT AT ALL
9. THOUGHTS THAT YOU WOULD BE BETTER OFF DEAD, OR OF HURTING YOURSELF: 0
6. FEELING BAD ABOUT YOURSELF - OR THAT YOU ARE A FAILURE OR HAVE LET YOURSELF OR YOUR FAMILY DOWN: NOT AT ALL
9. THOUGHTS THAT YOU WOULD BE BETTER OFF DEAD, OR OF HURTING YOURSELF: NOT AT ALL
3. TROUBLE FALLING OR STAYING ASLEEP OR SLEEPING TOO MUCH: NOT AT ALL
2. FEELING DOWN, DEPRESSED, IRRITABLE, OR HOPELESS: 1
7. TROUBLE CONCENTRATING ON THINGS, SUCH AS READING THE NEWSPAPER OR WATCHING TELEVISION: 0
2. FEELING DOWN, DEPRESSED OR HOPELESS: SEVERAL DAYS
5. POOR APPETITE OR OVEREATING: SEVERAL DAYS
3. TROUBLE FALLING OR STAYING ASLEEP OR SLEEPING TOO MUCH: NOT AT ALL
7. TROUBLE CONCENTRATING ON THINGS, SUCH AS READING THE NEWSPAPER OR WATCHING TELEVISION: NOT AT ALL
2. FEELING DOWN, DEPRESSED, IRRITABLE, OR HOPELESS: 1
9. THOUGHTS THAT YOU WOULD BE BETTER OFF DEAD, OR OF HURTING YOURSELF: NOT AT ALL
8. MOVING OR SPEAKING SO SLOWLY THAT OTHER PEOPLE COULD HAVE NOTICED. OR THE OPPOSITE, BEING SO FIGETY OR RESTLESS THAT YOU HAVE BEEN MOVING AROUND A LOT MORE THAN USUAL: NOT AT ALL
5. POOR APPETITE OR OVEREATING: SEVERAL DAYS
10. IF YOU CHECKED OFF ANY PROBLEMS, HOW DIFFICULT HAVE THESE PROBLEMS MADE IT FOR YOU TO DO YOUR WORK, TAKE CARE OF THINGS AT HOME, OR GET ALONG WITH OTHER PEOPLE: NOT DIFFICULT AT ALL
1. LITTLE INTEREST OR PLEASURE IN DOING THINGS: NOT AT ALL
4. FEELING TIRED OR HAVING LITTLE ENERGY: 1
1. LITTLE INTEREST OR PLEASURE IN DOING THINGS: NOT AT ALL
8. MOVING OR SPEAKING SO SLOWLY THAT OTHER PEOPLE COULD HAVE NOTICED. OR THE OPPOSITE, BEING SO FIGETY OR RESTLESS THAT YOU HAVE BEEN MOVING AROUND A LOT MORE THAN USUAL: 0
SUM OF ALL RESPONSES TO PHQ QUESTIONS 1-9: 3
6. FEELING BAD ABOUT YOURSELF - OR THAT YOU ARE A FAILURE OR HAVE LET YOURSELF OR YOUR FAMILY DOWN: 0
SUM OF ALL RESPONSES TO PHQ QUESTIONS 1-9: 3
6. FEELING BAD ABOUT YOURSELF - OR THAT YOU ARE A FAILURE OR HAVE LET YOURSELF OR YOUR FAMILY DOWN: NOT AT ALL
6. FEELING BAD ABOUT YOURSELF - OR THAT YOU ARE A FAILURE OR HAVE LET YOURSELF OR YOUR FAMILY DOWN: 0
4. FEELING TIRED OR HAVING LITTLE ENERGY: SEVERAL DAYS
4. FEELING TIRED OR HAVING LITTLE ENERGY: SEVERAL DAYS
7. TROUBLE CONCENTRATING ON THINGS, SUCH AS READING THE NEWSPAPER OR WATCHING TELEVISION: 0
6. FEELING BAD ABOUT YOURSELF - OR THAT YOU ARE A FAILURE OR HAVE LET YOURSELF OR YOUR FAMILY DOWN: NOT AT ALL
4. FEELING TIRED OR HAVING LITTLE ENERGY: 1
2. FEELING DOWN, DEPRESSED, IRRITABLE, OR HOPELESS: SEVERAL DAYS
10. IF YOU CHECKED OFF ANY PROBLEMS, HOW DIFFICULT HAVE THESE PROBLEMS MADE IT FOR YOU TO DO YOUR WORK, TAKE CARE OF THINGS AT HOME, OR GET ALONG WITH OTHER PEOPLE: NOT DIFFICULT AT ALL
3. TROUBLE FALLING OR STAYING ASLEEP OR SLEEPING TOO MUCH: NOT AT ALL
1. LITTLE INTEREST OR PLEASURE IN DOING THINGS: 0

## 2023-11-21 ASSESSMENT — PAIN SCALES - GENERAL: PAINLEVEL: 0-NO PAIN

## 2023-11-21 NOTE — SIGNIFICANT EVENT
11/21/23 1202   Prechemo Checklist   Has the patient been in the hospital, ED, or urgent care since last date of service No   Chemo/Immuno Consent Signed Yes   Protocol/Indications Verified Yes   Confirmed to previous date/time of medication Yes   Compared to previous dose Yes   All medications are dated accurately Yes   Pregnancy Test Negative Not applicable   Parameters Met Yes   BSA/Weight-Height Verified Yes   Dose Calculations Verified Yes

## 2023-11-21 NOTE — PROGRESS NOTES
Adverse Event Note     Name:Zina Hernandez  : 1955  MRN: [id number]      Adverse Event:chest and back pain  Medication: Paclitaxel  Administered Date/Time: 23 1356  Reactions/Symptoms Started Time: 1451   Symptoms: (check all that apply)  [x] Back Pain   [] Erythema Face     [] Hypotension     [] Rash/Rigors        [] Other  [] Bleeding    [] Erythema Hands  [] Itching               [] Swelling/Edema [] Unknown  [x] Chest Pain [] Hives/Urticaria     [] Low platelet Ct  [] Syncope  [] Cytopenia  [] Hypertension       [] Neutropenia         Severity: ModerateModerate   Provider Notified: Yesyes   Medications Given(Add all medications to the chart via , or treatment plan)  [] Acetaminophen-Tylenol       [x] Famotidine-Pepcid [] Nitroglycerine-NTG  [] Albuterol                               [] Hydrocortisone      [] Ondansetron-Zofran  [] Diphenhydramine-Benadryl [] Lorazepam-Ativan  [] Naloxone-Narcan  [] Epinephrine-Epi                    [x] Methylprednisone   Additional Details/ Comments:   Pt started to feel chest and back pain 6/10 @ 1451.   MD at bedside 1452.   Solumedrol 40 mg and N/S given at 1452  /84, , 96% RA  Pepcid 20 mg Given 1455  Chest and back pain 2/10 1456  160/86, 98 HR, 97% RA 1456  157/81 103 HR 93% RA 1500  163/97, 94 HR, 96% RA 1504  Per Dr Amin ok to not continue with the rest of Paclitaxel. About 5 mins left in bag.   Pt ok to leave around 1520 per Dr Amin  1526 131/73, 88 HR, 94% RA. Chest pain completely gone

## 2023-11-22 ENCOUNTER — TELEPHONE (OUTPATIENT)
Dept: ADMISSION | Facility: HOSPITAL | Age: 68
End: 2023-11-22
Payer: MEDICARE

## 2023-11-22 DIAGNOSIS — C50.912 INVASIVE LOBULAR CARCINOMA OF LEFT BREAST IN FEMALE (MULTI): Primary | ICD-10-CM

## 2023-11-22 RX ORDER — DEXAMETHASONE 0.5 MG/5ML
8 ELIXIR ORAL 2 TIMES DAILY
Qty: 160 ML | Refills: 0 | Status: SHIPPED | OUTPATIENT
Start: 2023-11-22 | End: 2024-06-07 | Stop reason: WASHOUT

## 2023-11-22 NOTE — TELEPHONE ENCOUNTER
Pharmacy calling for clarification on liquid Dexamethasone rx they received.  Secure Chat sent to team.  Dr. Amin will resubmit rx as it is only supposed to be for the day before chemo.   No further questions or concerns at this time.

## 2023-11-27 ENCOUNTER — APPOINTMENT (OUTPATIENT)
Dept: HEMATOLOGY/ONCOLOGY | Facility: CLINIC | Age: 68
End: 2023-11-27
Payer: MEDICARE

## 2023-11-27 ENCOUNTER — TELEPHONE (OUTPATIENT)
Dept: ADMISSION | Facility: HOSPITAL | Age: 68
End: 2023-11-27
Payer: MEDICARE

## 2023-11-27 NOTE — TELEPHONE ENCOUNTER
The patient wants to clarify that it is correct that she is supposed to take 80 mL of the dexamethasone BID today (total 160 mL) her treatment is tomorrow and she is concerned with the volume.    Per the script 0.5mg/5mL, and the script is for 8mg, 80mL would equal the 8mg. Routing to team to confirm.

## 2023-11-27 NOTE — TELEPHONE ENCOUNTER
"Per Dr. Amin \"8mg is equivalent to 80 ml, please tell her to take 40ml instead of 80ml\", confirmed that she will be taking 4mg BID (40mL) BID, she is agreeable to this and will do just that. No further questions at this time   "

## 2023-11-28 ENCOUNTER — INFUSION (OUTPATIENT)
Dept: HEMATOLOGY/ONCOLOGY | Facility: CLINIC | Age: 68
End: 2023-11-28
Payer: MEDICARE

## 2023-11-28 VITALS
SYSTOLIC BLOOD PRESSURE: 156 MMHG | OXYGEN SATURATION: 96 % | RESPIRATION RATE: 17 BRPM | BODY MASS INDEX: 24.52 KG/M2 | TEMPERATURE: 98.4 F | WEIGHT: 123.4 LBS | HEART RATE: 106 BPM | DIASTOLIC BLOOD PRESSURE: 76 MMHG

## 2023-11-28 DIAGNOSIS — C50.912 INVASIVE LOBULAR CARCINOMA OF LEFT BREAST IN FEMALE (MULTI): ICD-10-CM

## 2023-11-28 LAB
ALBUMIN SERPL BCP-MCNC: 3.7 G/DL (ref 3.4–5)
ALP SERPL-CCNC: 76 U/L (ref 33–136)
ALT SERPL W P-5'-P-CCNC: 22 U/L (ref 7–45)
ANION GAP SERPL CALC-SCNC: 13 MMOL/L (ref 10–20)
AST SERPL W P-5'-P-CCNC: 20 U/L (ref 9–39)
BASOPHILS # BLD AUTO: 0.04 X10*3/UL (ref 0–0.1)
BASOPHILS NFR BLD AUTO: 0.4 %
BILIRUB SERPL-MCNC: 0.2 MG/DL (ref 0–1.2)
BUN SERPL-MCNC: 29 MG/DL (ref 6–23)
CALCIUM SERPL-MCNC: 9.2 MG/DL (ref 8.6–10.3)
CHLORIDE SERPL-SCNC: 100 MMOL/L (ref 98–107)
CO2 SERPL-SCNC: 28 MMOL/L (ref 21–32)
CREAT SERPL-MCNC: 0.87 MG/DL (ref 0.5–1.05)
EOSINOPHIL # BLD AUTO: 0.01 X10*3/UL (ref 0–0.7)
EOSINOPHIL NFR BLD AUTO: 0.1 %
ERYTHROCYTE [DISTWIDTH] IN BLOOD BY AUTOMATED COUNT: 16.4 % (ref 11.5–14.5)
GFR SERPL CREATININE-BSD FRML MDRD: 73 ML/MIN/1.73M*2
GLUCOSE SERPL-MCNC: 150 MG/DL (ref 74–99)
HCT VFR BLD AUTO: 31.3 % (ref 36–46)
HGB BLD-MCNC: 10 G/DL (ref 12–16)
IMM GRANULOCYTES # BLD AUTO: 0.37 X10*3/UL (ref 0–0.7)
IMM GRANULOCYTES NFR BLD AUTO: 4 % (ref 0–0.9)
LYMPHOCYTES # BLD AUTO: 1.16 X10*3/UL (ref 1.2–4.8)
LYMPHOCYTES NFR BLD AUTO: 12.6 %
MCH RBC QN AUTO: 28.9 PG (ref 26–34)
MCHC RBC AUTO-ENTMCNC: 31.9 G/DL (ref 32–36)
MCV RBC AUTO: 91 FL (ref 80–100)
MONOCYTES # BLD AUTO: 0.84 X10*3/UL (ref 0.1–1)
MONOCYTES NFR BLD AUTO: 9.2 %
NEUTROPHILS # BLD AUTO: 6.75 X10*3/UL (ref 1.2–7.7)
NEUTROPHILS NFR BLD AUTO: 73.7 %
NRBC BLD-RTO: ABNORMAL /100{WBCS}
PLATELET # BLD AUTO: 362 X10*3/UL (ref 150–450)
POTASSIUM SERPL-SCNC: 3.7 MMOL/L (ref 3.5–5.3)
PROT SERPL-MCNC: 6.7 G/DL (ref 6.4–8.2)
RBC # BLD AUTO: 3.46 X10*6/UL (ref 4–5.2)
SODIUM SERPL-SCNC: 137 MMOL/L (ref 136–145)
WBC # BLD AUTO: 9.2 X10*3/UL (ref 4.4–11.3)

## 2023-11-28 PROCEDURE — 36593 DECLOT VASCULAR DEVICE: CPT

## 2023-11-28 PROCEDURE — 96417 CHEMO IV INFUS EACH ADDL SEQ: CPT

## 2023-11-28 PROCEDURE — 96413 CHEMO IV INFUSION 1 HR: CPT

## 2023-11-28 PROCEDURE — 2500000004 HC RX 250 GENERAL PHARMACY W/ HCPCS (ALT 636 FOR OP/ED): Performed by: STUDENT IN AN ORGANIZED HEALTH CARE EDUCATION/TRAINING PROGRAM

## 2023-11-28 PROCEDURE — 96375 TX/PRO/DX INJ NEW DRUG ADDON: CPT | Mod: INF

## 2023-11-28 PROCEDURE — 82040 ASSAY OF SERUM ALBUMIN: CPT

## 2023-11-28 PROCEDURE — 85025 COMPLETE CBC W/AUTO DIFF WBC: CPT

## 2023-11-28 RX ORDER — HEPARIN 100 UNIT/ML
500 SYRINGE INTRAVENOUS AS NEEDED
Status: CANCELLED | OUTPATIENT
Start: 2023-11-28

## 2023-11-28 RX ORDER — DIPHENHYDRAMINE HYDROCHLORIDE 50 MG/ML
50 INJECTION INTRAMUSCULAR; INTRAVENOUS AS NEEDED
Status: DISCONTINUED | OUTPATIENT
Start: 2023-11-28 | End: 2023-11-28 | Stop reason: HOSPADM

## 2023-11-28 RX ORDER — DIPHENHYDRAMINE HYDROCHLORIDE 50 MG/ML
50 INJECTION INTRAMUSCULAR; INTRAVENOUS ONCE
Status: CANCELLED
Start: 2023-11-28 | End: 2023-11-28

## 2023-11-28 RX ORDER — EPINEPHRINE 0.3 MG/.3ML
0.3 INJECTION SUBCUTANEOUS EVERY 5 MIN PRN
Status: DISCONTINUED | OUTPATIENT
Start: 2023-11-28 | End: 2023-11-28 | Stop reason: HOSPADM

## 2023-11-28 RX ORDER — DIPHENHYDRAMINE HYDROCHLORIDE 50 MG/ML
50 INJECTION INTRAMUSCULAR; INTRAVENOUS ONCE
Status: CANCELLED
Start: 2023-11-28

## 2023-11-28 RX ORDER — DEXAMETHASONE SODIUM PHOSPHATE 100 MG/10ML
10 INJECTION INTRAMUSCULAR; INTRAVENOUS ONCE
Status: COMPLETED | OUTPATIENT
Start: 2023-11-28 | End: 2023-11-28

## 2023-11-28 RX ORDER — FAMOTIDINE 10 MG/ML
20 INJECTION INTRAVENOUS ONCE AS NEEDED
Status: DISCONTINUED | OUTPATIENT
Start: 2023-11-28 | End: 2023-11-28 | Stop reason: HOSPADM

## 2023-11-28 RX ORDER — DIPHENHYDRAMINE HYDROCHLORIDE 50 MG/ML
50 INJECTION INTRAMUSCULAR; INTRAVENOUS ONCE
Status: COMPLETED | OUTPATIENT
Start: 2023-11-28 | End: 2023-11-28

## 2023-11-28 RX ORDER — DIPHENHYDRAMINE HYDROCHLORIDE 50 MG/ML
50 INJECTION INTRAMUSCULAR; INTRAVENOUS ONCE
Status: CANCELLED
Start: 2023-12-04 | End: 2023-12-04

## 2023-11-28 RX ORDER — HEPARIN 100 UNIT/ML
500 SYRINGE INTRAVENOUS AS NEEDED
Status: DISCONTINUED | OUTPATIENT
Start: 2023-11-28 | End: 2023-11-28 | Stop reason: HOSPADM

## 2023-11-28 RX ORDER — PROCHLORPERAZINE MALEATE 10 MG
10 TABLET ORAL EVERY 6 HOURS PRN
Status: DISCONTINUED | OUTPATIENT
Start: 2023-11-28 | End: 2023-11-28 | Stop reason: HOSPADM

## 2023-11-28 RX ORDER — PROCHLORPERAZINE EDISYLATE 5 MG/ML
10 INJECTION INTRAMUSCULAR; INTRAVENOUS EVERY 6 HOURS PRN
Status: DISCONTINUED | OUTPATIENT
Start: 2023-11-28 | End: 2023-11-28 | Stop reason: HOSPADM

## 2023-11-28 RX ORDER — HEPARIN SODIUM,PORCINE/PF 10 UNIT/ML
50 SYRINGE (ML) INTRAVENOUS AS NEEDED
Status: CANCELLED | OUTPATIENT
Start: 2023-11-28

## 2023-11-28 RX ORDER — FAMOTIDINE 10 MG/ML
20 INJECTION INTRAVENOUS ONCE
Status: COMPLETED | OUTPATIENT
Start: 2023-11-28 | End: 2023-11-28

## 2023-11-28 RX ORDER — ALBUTEROL SULFATE 0.83 MG/ML
3 SOLUTION RESPIRATORY (INHALATION) AS NEEDED
Status: DISCONTINUED | OUTPATIENT
Start: 2023-11-28 | End: 2023-11-28 | Stop reason: HOSPADM

## 2023-11-28 RX ADMIN — DIPHENHYDRAMINE HYDROCHLORIDE 50 MG: 50 INJECTION INTRAMUSCULAR; INTRAVENOUS at 14:38

## 2023-11-28 RX ADMIN — PACLITAXEL 90 MG: 6 INJECTION, SOLUTION, CONCENTRATE INTRAVENOUS at 15:38

## 2023-11-28 RX ADMIN — TRASTUZUMAB-ANNS 111 MG: 150 INJECTION, POWDER, LYOPHILIZED, FOR SOLUTION INTRAVENOUS at 14:58

## 2023-11-28 RX ADMIN — DEXAMETHASONE SODIUM PHOSPHATE 10 MG: 10 INJECTION INTRAMUSCULAR; INTRAVENOUS at 14:38

## 2023-11-28 RX ADMIN — FAMOTIDINE 20 MG: 10 INJECTION INTRAVENOUS at 14:38

## 2023-11-28 RX ADMIN — ALTEPLASE 2 MG: 2.2 INJECTION, POWDER, LYOPHILIZED, FOR SOLUTION INTRAVENOUS at 12:26

## 2023-11-28 ASSESSMENT — PAIN SCALES - GENERAL: PAINLEVEL: 0-NO PAIN

## 2023-11-29 ENCOUNTER — TELEPHONE (OUTPATIENT)
Dept: ADMISSION | Facility: HOSPITAL | Age: 68
End: 2023-11-29
Payer: MEDICARE

## 2023-11-29 DIAGNOSIS — Z17.0 MALIGNANT NEOPLASM OF UPPER-OUTER QUADRANT OF LEFT BREAST IN FEMALE, ESTROGEN RECEPTOR POSITIVE (MULTI): Primary | ICD-10-CM

## 2023-11-29 DIAGNOSIS — C50.912 INVASIVE LOBULAR CARCINOMA OF LEFT BREAST IN FEMALE (MULTI): ICD-10-CM

## 2023-11-29 DIAGNOSIS — C50.912 INVASIVE LOBULAR CARCINOMA OF LEFT BREAST IN FEMALE (MULTI): Primary | ICD-10-CM

## 2023-11-29 DIAGNOSIS — C50.412 MALIGNANT NEOPLASM OF UPPER-OUTER QUADRANT OF LEFT BREAST IN FEMALE, ESTROGEN RECEPTOR POSITIVE (MULTI): Primary | ICD-10-CM

## 2023-11-29 RX ORDER — DEXAMETHASONE 0.5 MG/5ML
4 ELIXIR ORAL 2 TIMES DAILY
Qty: 1600 ML | Refills: 0 | Status: SHIPPED | OUTPATIENT
Start: 2023-11-29 | End: 2024-06-07 | Stop reason: WASHOUT

## 2023-11-29 RX ORDER — DEXAMETHASONE 0.5 MG/5ML
4 ELIXIR ORAL 2 TIMES DAILY
Qty: 1600 ML | Refills: 0 | Status: SHIPPED | OUTPATIENT
Start: 2023-11-29 | End: 2023-11-29 | Stop reason: SDUPTHER

## 2023-11-29 NOTE — TELEPHONE ENCOUNTER
Pt requesting a refill of dexamethasone 0.5mg/5mL, 40mL BID the day prior to infusion.  Pt reports that the medication is working well and she has not had further chest pain with her treatment.  [facility] in [place].

## 2023-11-29 NOTE — TELEPHONE ENCOUNTER
Patient wanted to make Dr. Amin aware that her Onco Cardiology is not until 1/15 in [place]. She does not want to come to main campus and that is the soonest available.

## 2023-12-01 ENCOUNTER — NURSE TRIAGE (OUTPATIENT)
Dept: ADMISSION | Facility: HOSPITAL | Age: 68
End: 2023-12-01
Payer: MEDICARE

## 2023-12-01 DIAGNOSIS — C50.912 INVASIVE LOBULAR CARCINOMA OF LEFT BREAST IN FEMALE (MULTI): Primary | ICD-10-CM

## 2023-12-01 NOTE — TELEPHONE ENCOUNTER
"Pt states she noticed a white bump- not fluid filled - on her gum below her molar today. Approx 1/2 the size of a pea.   Gum is sore.   Pt also noticed bilat ankle edema over past few days with bilat calf \"tightness\"- denies redness/warmth of calves or tender to touch.   No bleeding noted. No fever. No white patches/coating on tongue or in mouth/throat. No n/v/c/d. No headache, congestion, cough.  Pt has been using warm water/baking soda/salt rinse  1 x daily for past week.   Infusion scheduled 12/5 with [facility] 12/12.     "

## 2023-12-04 ENCOUNTER — APPOINTMENT (OUTPATIENT)
Dept: HEMATOLOGY/ONCOLOGY | Facility: CLINIC | Age: 68
End: 2023-12-04
Payer: MEDICARE

## 2023-12-04 ENCOUNTER — APPOINTMENT (OUTPATIENT)
Dept: CARDIOLOGY | Facility: CLINIC | Age: 68
End: 2023-12-04
Payer: MEDICARE

## 2023-12-05 ENCOUNTER — INFUSION (OUTPATIENT)
Dept: HEMATOLOGY/ONCOLOGY | Facility: CLINIC | Age: 68
End: 2023-12-05
Payer: MEDICARE

## 2023-12-05 VITALS
BODY MASS INDEX: 24.8 KG/M2 | TEMPERATURE: 98.2 F | HEIGHT: 59 IN | SYSTOLIC BLOOD PRESSURE: 131 MMHG | RESPIRATION RATE: 16 BRPM | OXYGEN SATURATION: 97 % | HEART RATE: 97 BPM | DIASTOLIC BLOOD PRESSURE: 73 MMHG | WEIGHT: 123 LBS

## 2023-12-05 DIAGNOSIS — C50.912 INVASIVE LOBULAR CARCINOMA OF LEFT BREAST IN FEMALE (MULTI): ICD-10-CM

## 2023-12-05 DIAGNOSIS — C50.912 INVASIVE LOBULAR CARCINOMA OF LEFT BREAST IN FEMALE (MULTI): Primary | ICD-10-CM

## 2023-12-05 LAB
ALBUMIN SERPL BCP-MCNC: 3.9 G/DL (ref 3.4–5)
ALP SERPL-CCNC: 70 U/L (ref 33–136)
ALT SERPL W P-5'-P-CCNC: 24 U/L (ref 7–45)
ANION GAP SERPL CALC-SCNC: 12 MMOL/L (ref 10–20)
AST SERPL W P-5'-P-CCNC: 20 U/L (ref 9–39)
BASOPHILS # BLD AUTO: 0.02 X10*3/UL (ref 0–0.1)
BASOPHILS NFR BLD AUTO: 0.3 %
BILIRUB SERPL-MCNC: 0.2 MG/DL (ref 0–1.2)
BUN SERPL-MCNC: 28 MG/DL (ref 6–23)
CALCIUM SERPL-MCNC: 9.2 MG/DL (ref 8.6–10.3)
CHLORIDE SERPL-SCNC: 102 MMOL/L (ref 98–107)
CO2 SERPL-SCNC: 28 MMOL/L (ref 21–32)
CREAT SERPL-MCNC: 0.8 MG/DL (ref 0.5–1.05)
EOSINOPHIL # BLD AUTO: 0 X10*3/UL (ref 0–0.7)
EOSINOPHIL NFR BLD AUTO: 0 %
ERYTHROCYTE [DISTWIDTH] IN BLOOD BY AUTOMATED COUNT: 16.2 % (ref 11.5–14.5)
GFR SERPL CREATININE-BSD FRML MDRD: 80 ML/MIN/1.73M*2
GLUCOSE SERPL-MCNC: 118 MG/DL (ref 74–99)
HCT VFR BLD AUTO: 30.6 % (ref 36–46)
HGB BLD-MCNC: 9.9 G/DL (ref 12–16)
IMM GRANULOCYTES # BLD AUTO: 0.05 X10*3/UL (ref 0–0.7)
IMM GRANULOCYTES NFR BLD AUTO: 0.8 % (ref 0–0.9)
LYMPHOCYTES # BLD AUTO: 0.91 X10*3/UL (ref 1.2–4.8)
LYMPHOCYTES NFR BLD AUTO: 13.9 %
MCH RBC QN AUTO: 29 PG (ref 26–34)
MCHC RBC AUTO-ENTMCNC: 32.4 G/DL (ref 32–36)
MCV RBC AUTO: 90 FL (ref 80–100)
MONOCYTES # BLD AUTO: 0.57 X10*3/UL (ref 0.1–1)
MONOCYTES NFR BLD AUTO: 8.7 %
NEUTROPHILS # BLD AUTO: 5.01 X10*3/UL (ref 1.2–7.7)
NEUTROPHILS NFR BLD AUTO: 76.3 %
NRBC BLD-RTO: ABNORMAL /100{WBCS}
PLATELET # BLD AUTO: 262 X10*3/UL (ref 150–450)
POTASSIUM SERPL-SCNC: 3.7 MMOL/L (ref 3.5–5.3)
PROT SERPL-MCNC: 6.7 G/DL (ref 6.4–8.2)
RBC # BLD AUTO: 3.41 X10*6/UL (ref 4–5.2)
SODIUM SERPL-SCNC: 138 MMOL/L (ref 136–145)
WBC # BLD AUTO: 6.6 X10*3/UL (ref 4.4–11.3)

## 2023-12-05 PROCEDURE — 96413 CHEMO IV INFUSION 1 HR: CPT

## 2023-12-05 PROCEDURE — 80053 COMPREHEN METABOLIC PANEL: CPT

## 2023-12-05 PROCEDURE — 96375 TX/PRO/DX INJ NEW DRUG ADDON: CPT | Mod: INF

## 2023-12-05 PROCEDURE — 2500000004 HC RX 250 GENERAL PHARMACY W/ HCPCS (ALT 636 FOR OP/ED): Performed by: STUDENT IN AN ORGANIZED HEALTH CARE EDUCATION/TRAINING PROGRAM

## 2023-12-05 PROCEDURE — 96417 CHEMO IV INFUS EACH ADDL SEQ: CPT

## 2023-12-05 PROCEDURE — 85025 COMPLETE CBC W/AUTO DIFF WBC: CPT

## 2023-12-05 PROCEDURE — 36593 DECLOT VASCULAR DEVICE: CPT

## 2023-12-05 RX ORDER — ALBUTEROL SULFATE 0.83 MG/ML
3 SOLUTION RESPIRATORY (INHALATION) AS NEEDED
Status: DISCONTINUED | OUTPATIENT
Start: 2023-12-05 | End: 2023-12-05 | Stop reason: HOSPADM

## 2023-12-05 RX ORDER — PROCHLORPERAZINE MALEATE 10 MG
10 TABLET ORAL EVERY 6 HOURS PRN
Status: CANCELLED | OUTPATIENT
Start: 2023-12-26

## 2023-12-05 RX ORDER — ALBUTEROL SULFATE 0.83 MG/ML
3 SOLUTION RESPIRATORY (INHALATION) AS NEEDED
Status: CANCELLED | OUTPATIENT
Start: 2023-12-19

## 2023-12-05 RX ORDER — EPINEPHRINE 0.3 MG/.3ML
0.3 INJECTION SUBCUTANEOUS EVERY 5 MIN PRN
Status: DISCONTINUED | OUTPATIENT
Start: 2023-12-05 | End: 2023-12-05 | Stop reason: HOSPADM

## 2023-12-05 RX ORDER — DEXAMETHASONE SODIUM PHOSPHATE 100 MG/10ML
10 INJECTION INTRAMUSCULAR; INTRAVENOUS ONCE
Status: COMPLETED | OUTPATIENT
Start: 2023-12-05 | End: 2023-12-05

## 2023-12-05 RX ORDER — ALBUTEROL SULFATE 0.83 MG/ML
3 SOLUTION RESPIRATORY (INHALATION) AS NEEDED
Status: CANCELLED | OUTPATIENT
Start: 2024-01-02

## 2023-12-05 RX ORDER — EPINEPHRINE 0.3 MG/.3ML
0.3 INJECTION SUBCUTANEOUS EVERY 5 MIN PRN
Status: CANCELLED | OUTPATIENT
Start: 2024-01-02

## 2023-12-05 RX ORDER — HEPARIN 100 UNIT/ML
500 SYRINGE INTRAVENOUS AS NEEDED
Status: CANCELLED | OUTPATIENT
Start: 2023-12-05

## 2023-12-05 RX ORDER — FAMOTIDINE 10 MG/ML
20 INJECTION INTRAVENOUS ONCE AS NEEDED
Status: CANCELLED | OUTPATIENT
Start: 2023-12-26

## 2023-12-05 RX ORDER — PROCHLORPERAZINE MALEATE 10 MG
10 TABLET ORAL EVERY 6 HOURS PRN
Status: CANCELLED | OUTPATIENT
Start: 2023-12-19

## 2023-12-05 RX ORDER — EPINEPHRINE 0.3 MG/.3ML
0.3 INJECTION SUBCUTANEOUS EVERY 5 MIN PRN
Status: CANCELLED | OUTPATIENT
Start: 2023-12-19

## 2023-12-05 RX ORDER — EPINEPHRINE 0.3 MG/.3ML
0.3 INJECTION SUBCUTANEOUS EVERY 5 MIN PRN
Status: CANCELLED | OUTPATIENT
Start: 2023-12-26

## 2023-12-05 RX ORDER — DIPHENHYDRAMINE HYDROCHLORIDE 50 MG/ML
50 INJECTION INTRAMUSCULAR; INTRAVENOUS AS NEEDED
Status: CANCELLED | OUTPATIENT
Start: 2024-01-02

## 2023-12-05 RX ORDER — FAMOTIDINE 10 MG/ML
20 INJECTION INTRAVENOUS ONCE
Status: CANCELLED | OUTPATIENT
Start: 2024-01-02

## 2023-12-05 RX ORDER — DIPHENHYDRAMINE HYDROCHLORIDE 50 MG/ML
50 INJECTION INTRAMUSCULAR; INTRAVENOUS ONCE
Status: CANCELLED
Start: 2024-01-02 | End: 2024-01-02

## 2023-12-05 RX ORDER — DEXAMETHASONE SODIUM PHOSPHATE 100 MG/10ML
10 INJECTION INTRAMUSCULAR; INTRAVENOUS ONCE
Status: CANCELLED | OUTPATIENT
Start: 2023-12-26

## 2023-12-05 RX ORDER — PROCHLORPERAZINE EDISYLATE 5 MG/ML
10 INJECTION INTRAMUSCULAR; INTRAVENOUS EVERY 6 HOURS PRN
Status: CANCELLED | OUTPATIENT
Start: 2023-12-26

## 2023-12-05 RX ORDER — DIPHENHYDRAMINE HYDROCHLORIDE 50 MG/ML
50 INJECTION INTRAMUSCULAR; INTRAVENOUS ONCE
Status: CANCELLED
Start: 2023-12-19 | End: 2023-12-19

## 2023-12-05 RX ORDER — DIPHENHYDRAMINE HYDROCHLORIDE 50 MG/ML
50 INJECTION INTRAMUSCULAR; INTRAVENOUS AS NEEDED
Status: DISCONTINUED | OUTPATIENT
Start: 2023-12-05 | End: 2023-12-05 | Stop reason: HOSPADM

## 2023-12-05 RX ORDER — DIPHENHYDRAMINE HYDROCHLORIDE 50 MG/ML
50 INJECTION INTRAMUSCULAR; INTRAVENOUS ONCE
Status: CANCELLED
Start: 2023-12-25 | End: 2023-12-25

## 2023-12-05 RX ORDER — PROCHLORPERAZINE MALEATE 10 MG
10 TABLET ORAL EVERY 6 HOURS PRN
Status: DISCONTINUED | OUTPATIENT
Start: 2023-12-05 | End: 2023-12-05 | Stop reason: HOSPADM

## 2023-12-05 RX ORDER — DIPHENHYDRAMINE HYDROCHLORIDE 50 MG/ML
50 INJECTION INTRAMUSCULAR; INTRAVENOUS ONCE
Status: CANCELLED
Start: 2023-12-18 | End: 2023-12-18

## 2023-12-05 RX ORDER — FAMOTIDINE 10 MG/ML
20 INJECTION INTRAVENOUS ONCE AS NEEDED
Status: CANCELLED | OUTPATIENT
Start: 2023-12-19

## 2023-12-05 RX ORDER — HEPARIN 100 UNIT/ML
500 SYRINGE INTRAVENOUS AS NEEDED
Status: DISCONTINUED | OUTPATIENT
Start: 2023-12-05 | End: 2023-12-05 | Stop reason: HOSPADM

## 2023-12-05 RX ORDER — FAMOTIDINE 10 MG/ML
20 INJECTION INTRAVENOUS ONCE
Status: COMPLETED | OUTPATIENT
Start: 2023-12-05 | End: 2023-12-05

## 2023-12-05 RX ORDER — DIPHENHYDRAMINE HYDROCHLORIDE 50 MG/ML
50 INJECTION INTRAMUSCULAR; INTRAVENOUS ONCE
Status: CANCELLED
Start: 2023-12-26 | End: 2023-12-26

## 2023-12-05 RX ORDER — PROCHLORPERAZINE MALEATE 10 MG
10 TABLET ORAL EVERY 6 HOURS PRN
Status: CANCELLED | OUTPATIENT
Start: 2024-01-02

## 2023-12-05 RX ORDER — DEXAMETHASONE SODIUM PHOSPHATE 100 MG/10ML
10 INJECTION INTRAMUSCULAR; INTRAVENOUS ONCE
Status: CANCELLED | OUTPATIENT
Start: 2023-12-19

## 2023-12-05 RX ORDER — FAMOTIDINE 10 MG/ML
20 INJECTION INTRAVENOUS ONCE AS NEEDED
Status: DISCONTINUED | OUTPATIENT
Start: 2023-12-05 | End: 2023-12-05 | Stop reason: HOSPADM

## 2023-12-05 RX ORDER — FAMOTIDINE 10 MG/ML
20 INJECTION INTRAVENOUS ONCE
Status: CANCELLED | OUTPATIENT
Start: 2023-12-19

## 2023-12-05 RX ORDER — FAMOTIDINE 10 MG/ML
20 INJECTION INTRAVENOUS ONCE AS NEEDED
Status: CANCELLED | OUTPATIENT
Start: 2024-01-02

## 2023-12-05 RX ORDER — DIPHENHYDRAMINE HYDROCHLORIDE 50 MG/ML
50 INJECTION INTRAMUSCULAR; INTRAVENOUS ONCE
Status: CANCELLED
Start: 2023-12-12 | End: 2023-12-12

## 2023-12-05 RX ORDER — ALBUTEROL SULFATE 0.83 MG/ML
3 SOLUTION RESPIRATORY (INHALATION) AS NEEDED
Status: CANCELLED | OUTPATIENT
Start: 2023-12-26

## 2023-12-05 RX ORDER — PROCHLORPERAZINE EDISYLATE 5 MG/ML
10 INJECTION INTRAMUSCULAR; INTRAVENOUS EVERY 6 HOURS PRN
Status: CANCELLED | OUTPATIENT
Start: 2024-01-02

## 2023-12-05 RX ORDER — DIPHENHYDRAMINE HYDROCHLORIDE 50 MG/ML
50 INJECTION INTRAMUSCULAR; INTRAVENOUS ONCE
Status: CANCELLED
Start: 2023-12-11 | End: 2023-12-11

## 2023-12-05 RX ORDER — DIPHENHYDRAMINE HYDROCHLORIDE 50 MG/ML
50 INJECTION INTRAMUSCULAR; INTRAVENOUS ONCE
Status: CANCELLED
Start: 2023-12-05 | End: 2023-12-05

## 2023-12-05 RX ORDER — DIPHENHYDRAMINE HYDROCHLORIDE 50 MG/ML
50 INJECTION INTRAMUSCULAR; INTRAVENOUS AS NEEDED
Status: CANCELLED | OUTPATIENT
Start: 2023-12-26

## 2023-12-05 RX ORDER — DIPHENHYDRAMINE HYDROCHLORIDE 50 MG/ML
50 INJECTION INTRAMUSCULAR; INTRAVENOUS ONCE
Status: CANCELLED
Start: 2024-01-01 | End: 2024-01-01

## 2023-12-05 RX ORDER — PROCHLORPERAZINE EDISYLATE 5 MG/ML
10 INJECTION INTRAMUSCULAR; INTRAVENOUS EVERY 6 HOURS PRN
Status: DISCONTINUED | OUTPATIENT
Start: 2023-12-05 | End: 2023-12-05 | Stop reason: HOSPADM

## 2023-12-05 RX ORDER — FAMOTIDINE 10 MG/ML
20 INJECTION INTRAVENOUS ONCE
Status: CANCELLED | OUTPATIENT
Start: 2023-12-26

## 2023-12-05 RX ORDER — HEPARIN SODIUM,PORCINE/PF 10 UNIT/ML
50 SYRINGE (ML) INTRAVENOUS AS NEEDED
Status: CANCELLED | OUTPATIENT
Start: 2023-12-05

## 2023-12-05 RX ORDER — DIPHENHYDRAMINE HYDROCHLORIDE 50 MG/ML
50 INJECTION INTRAMUSCULAR; INTRAVENOUS AS NEEDED
Status: CANCELLED | OUTPATIENT
Start: 2023-12-19

## 2023-12-05 RX ORDER — DIPHENHYDRAMINE HYDROCHLORIDE 50 MG/ML
50 INJECTION INTRAMUSCULAR; INTRAVENOUS ONCE
Status: COMPLETED | OUTPATIENT
Start: 2023-12-05 | End: 2023-12-05

## 2023-12-05 RX ORDER — PROCHLORPERAZINE EDISYLATE 5 MG/ML
10 INJECTION INTRAMUSCULAR; INTRAVENOUS EVERY 6 HOURS PRN
Status: CANCELLED | OUTPATIENT
Start: 2023-12-19

## 2023-12-05 RX ORDER — DEXAMETHASONE SODIUM PHOSPHATE 100 MG/10ML
10 INJECTION INTRAMUSCULAR; INTRAVENOUS ONCE
Status: CANCELLED | OUTPATIENT
Start: 2024-01-02

## 2023-12-05 RX ADMIN — TRASTUZUMAB-ANNS 111 MG: 150 INJECTION, POWDER, LYOPHILIZED, FOR SOLUTION INTRAVENOUS at 13:18

## 2023-12-05 RX ADMIN — FAMOTIDINE 20 MG: 10 INJECTION INTRAVENOUS at 12:57

## 2023-12-05 RX ADMIN — DEXAMETHASONE SODIUM PHOSPHATE 10 MG: 10 INJECTION INTRAMUSCULAR; INTRAVENOUS at 12:57

## 2023-12-05 RX ADMIN — HEPARIN 500 UNITS: 100 SYRINGE at 15:32

## 2023-12-05 RX ADMIN — DIPHENHYDRAMINE HYDROCHLORIDE 50 MG: 50 INJECTION INTRAMUSCULAR; INTRAVENOUS at 12:58

## 2023-12-05 RX ADMIN — ALTEPLASE 2 MG: 2.2 INJECTION, POWDER, LYOPHILIZED, FOR SOLUTION INTRAVENOUS at 11:53

## 2023-12-05 RX ADMIN — PACLITAXEL 90 MG: 6 INJECTION, SOLUTION, CONCENTRATE INTRAVENOUS at 14:04

## 2023-12-05 ASSESSMENT — PAIN SCALES - GENERAL: PAINLEVEL: 0-NO PAIN

## 2023-12-11 ENCOUNTER — APPOINTMENT (OUTPATIENT)
Dept: HEMATOLOGY/ONCOLOGY | Facility: CLINIC | Age: 68
End: 2023-12-11
Payer: MEDICARE

## 2023-12-12 ENCOUNTER — OFFICE VISIT (OUTPATIENT)
Dept: HEMATOLOGY/ONCOLOGY | Facility: CLINIC | Age: 68
End: 2023-12-12
Payer: MEDICARE

## 2023-12-12 ENCOUNTER — INFUSION (OUTPATIENT)
Dept: HEMATOLOGY/ONCOLOGY | Facility: CLINIC | Age: 68
End: 2023-12-12
Payer: MEDICARE

## 2023-12-12 VITALS
DIASTOLIC BLOOD PRESSURE: 74 MMHG | BODY MASS INDEX: 24.57 KG/M2 | TEMPERATURE: 98.4 F | RESPIRATION RATE: 16 BRPM | HEART RATE: 85 BPM | SYSTOLIC BLOOD PRESSURE: 134 MMHG | WEIGHT: 123.68 LBS | OXYGEN SATURATION: 96 %

## 2023-12-12 DIAGNOSIS — C50.912 INVASIVE LOBULAR CARCINOMA OF LEFT BREAST IN FEMALE (MULTI): ICD-10-CM

## 2023-12-12 LAB
ALBUMIN SERPL BCP-MCNC: 4.1 G/DL (ref 3.4–5)
ALP SERPL-CCNC: 75 U/L (ref 33–136)
ALT SERPL W P-5'-P-CCNC: 37 U/L (ref 7–45)
ANION GAP SERPL CALC-SCNC: 13 MMOL/L (ref 10–20)
AST SERPL W P-5'-P-CCNC: 31 U/L (ref 9–39)
BASOPHILS # BLD AUTO: 0.02 X10*3/UL (ref 0–0.1)
BASOPHILS NFR BLD AUTO: 0.3 %
BILIRUB SERPL-MCNC: 0.3 MG/DL (ref 0–1.2)
BUN SERPL-MCNC: 24 MG/DL (ref 6–23)
CALCIUM SERPL-MCNC: 9.4 MG/DL (ref 8.6–10.3)
CHLORIDE SERPL-SCNC: 101 MMOL/L (ref 98–107)
CO2 SERPL-SCNC: 27 MMOL/L (ref 21–32)
CREAT SERPL-MCNC: 0.85 MG/DL (ref 0.5–1.05)
EOSINOPHIL # BLD AUTO: 0.01 X10*3/UL (ref 0–0.7)
EOSINOPHIL NFR BLD AUTO: 0.2 %
ERYTHROCYTE [DISTWIDTH] IN BLOOD BY AUTOMATED COUNT: 16.8 % (ref 11.5–14.5)
GFR SERPL CREATININE-BSD FRML MDRD: 75 ML/MIN/1.73M*2
GLUCOSE SERPL-MCNC: 110 MG/DL (ref 74–99)
HCT VFR BLD AUTO: 31.9 % (ref 36–46)
HGB BLD-MCNC: 10.3 G/DL (ref 12–16)
IMM GRANULOCYTES # BLD AUTO: 0.12 X10*3/UL (ref 0–0.7)
IMM GRANULOCYTES NFR BLD AUTO: 1.9 % (ref 0–0.9)
LYMPHOCYTES # BLD AUTO: 1.37 X10*3/UL (ref 1.2–4.8)
LYMPHOCYTES NFR BLD AUTO: 22.2 %
MCH RBC QN AUTO: 29.2 PG (ref 26–34)
MCHC RBC AUTO-ENTMCNC: 32.3 G/DL (ref 32–36)
MCV RBC AUTO: 90 FL (ref 80–100)
MONOCYTES # BLD AUTO: 0.8 X10*3/UL (ref 0.1–1)
MONOCYTES NFR BLD AUTO: 13 %
NEUTROPHILS # BLD AUTO: 3.85 X10*3/UL (ref 1.2–7.7)
NEUTROPHILS NFR BLD AUTO: 62.4 %
NRBC BLD-RTO: ABNORMAL /100{WBCS}
PLATELET # BLD AUTO: 270 X10*3/UL (ref 150–450)
POTASSIUM SERPL-SCNC: 3.8 MMOL/L (ref 3.5–5.3)
PROT SERPL-MCNC: 6.9 G/DL (ref 6.4–8.2)
RBC # BLD AUTO: 3.53 X10*6/UL (ref 4–5.2)
SODIUM SERPL-SCNC: 137 MMOL/L (ref 136–145)
WBC # BLD AUTO: 6.2 X10*3/UL (ref 4.4–11.3)

## 2023-12-12 PROCEDURE — 82310 ASSAY OF CALCIUM: CPT

## 2023-12-12 PROCEDURE — 96413 CHEMO IV INFUSION 1 HR: CPT

## 2023-12-12 PROCEDURE — 2500000004 HC RX 250 GENERAL PHARMACY W/ HCPCS (ALT 636 FOR OP/ED): Mod: JW | Performed by: STUDENT IN AN ORGANIZED HEALTH CARE EDUCATION/TRAINING PROGRAM

## 2023-12-12 PROCEDURE — 96415 CHEMO IV INFUSION ADDL HR: CPT

## 2023-12-12 PROCEDURE — 99214 OFFICE O/P EST MOD 30 MIN: CPT | Mod: 25 | Performed by: STUDENT IN AN ORGANIZED HEALTH CARE EDUCATION/TRAINING PROGRAM

## 2023-12-12 PROCEDURE — 99214 OFFICE O/P EST MOD 30 MIN: CPT | Performed by: STUDENT IN AN ORGANIZED HEALTH CARE EDUCATION/TRAINING PROGRAM

## 2023-12-12 PROCEDURE — 1036F TOBACCO NON-USER: CPT | Performed by: STUDENT IN AN ORGANIZED HEALTH CARE EDUCATION/TRAINING PROGRAM

## 2023-12-12 PROCEDURE — 96375 TX/PRO/DX INJ NEW DRUG ADDON: CPT | Mod: INF

## 2023-12-12 PROCEDURE — 96417 CHEMO IV INFUS EACH ADDL SEQ: CPT

## 2023-12-12 PROCEDURE — 85025 COMPLETE CBC W/AUTO DIFF WBC: CPT

## 2023-12-12 PROCEDURE — 1159F MED LIST DOCD IN RCRD: CPT | Performed by: STUDENT IN AN ORGANIZED HEALTH CARE EDUCATION/TRAINING PROGRAM

## 2023-12-12 PROCEDURE — 1126F AMNT PAIN NOTED NONE PRSNT: CPT | Performed by: STUDENT IN AN ORGANIZED HEALTH CARE EDUCATION/TRAINING PROGRAM

## 2023-12-12 PROCEDURE — 2500000004 HC RX 250 GENERAL PHARMACY W/ HCPCS (ALT 636 FOR OP/ED): Performed by: STUDENT IN AN ORGANIZED HEALTH CARE EDUCATION/TRAINING PROGRAM

## 2023-12-12 RX ORDER — DEXAMETHASONE SODIUM PHOSPHATE 100 MG/10ML
10 INJECTION INTRAMUSCULAR; INTRAVENOUS ONCE
Status: COMPLETED | OUTPATIENT
Start: 2023-12-12 | End: 2023-12-12

## 2023-12-12 RX ORDER — PROCHLORPERAZINE EDISYLATE 5 MG/ML
10 INJECTION INTRAMUSCULAR; INTRAVENOUS EVERY 6 HOURS PRN
Status: DISCONTINUED | OUTPATIENT
Start: 2023-12-12 | End: 2023-12-12 | Stop reason: HOSPADM

## 2023-12-12 RX ORDER — ALBUTEROL SULFATE 0.83 MG/ML
3 SOLUTION RESPIRATORY (INHALATION) AS NEEDED
Status: DISCONTINUED | OUTPATIENT
Start: 2023-12-12 | End: 2023-12-12 | Stop reason: HOSPADM

## 2023-12-12 RX ORDER — HEPARIN 100 UNIT/ML
500 SYRINGE INTRAVENOUS AS NEEDED
Status: CANCELLED | OUTPATIENT
Start: 2023-12-12

## 2023-12-12 RX ORDER — PROCHLORPERAZINE MALEATE 10 MG
10 TABLET ORAL EVERY 6 HOURS PRN
Status: DISCONTINUED | OUTPATIENT
Start: 2023-12-12 | End: 2023-12-12 | Stop reason: HOSPADM

## 2023-12-12 RX ORDER — HEPARIN 100 UNIT/ML
500 SYRINGE INTRAVENOUS AS NEEDED
Status: DISCONTINUED | OUTPATIENT
Start: 2023-12-12 | End: 2023-12-12 | Stop reason: HOSPADM

## 2023-12-12 RX ORDER — DIPHENHYDRAMINE HYDROCHLORIDE 50 MG/ML
50 INJECTION INTRAMUSCULAR; INTRAVENOUS AS NEEDED
Status: DISCONTINUED | OUTPATIENT
Start: 2023-12-12 | End: 2023-12-12 | Stop reason: HOSPADM

## 2023-12-12 RX ORDER — HEPARIN SODIUM,PORCINE/PF 10 UNIT/ML
50 SYRINGE (ML) INTRAVENOUS AS NEEDED
Status: CANCELLED | OUTPATIENT
Start: 2023-12-12

## 2023-12-12 RX ORDER — FAMOTIDINE 10 MG/ML
20 INJECTION INTRAVENOUS ONCE
Status: COMPLETED | OUTPATIENT
Start: 2023-12-12 | End: 2023-12-12

## 2023-12-12 RX ORDER — FAMOTIDINE 10 MG/ML
20 INJECTION INTRAVENOUS ONCE AS NEEDED
Status: DISCONTINUED | OUTPATIENT
Start: 2023-12-12 | End: 2023-12-12 | Stop reason: HOSPADM

## 2023-12-12 RX ORDER — DIPHENHYDRAMINE HYDROCHLORIDE 50 MG/ML
50 INJECTION INTRAMUSCULAR; INTRAVENOUS ONCE
Status: COMPLETED | OUTPATIENT
Start: 2023-12-12 | End: 2023-12-12

## 2023-12-12 RX ORDER — EPINEPHRINE 0.3 MG/.3ML
0.3 INJECTION SUBCUTANEOUS EVERY 5 MIN PRN
Status: DISCONTINUED | OUTPATIENT
Start: 2023-12-12 | End: 2023-12-12 | Stop reason: HOSPADM

## 2023-12-12 RX ADMIN — DEXAMETHASONE SODIUM PHOSPHATE 10 MG: 10 INJECTION INTRAMUSCULAR; INTRAVENOUS at 12:15

## 2023-12-12 RX ADMIN — PACLITAXEL 90 MG: 6 INJECTION, SOLUTION, CONCENTRATE INTRAVENOUS at 13:48

## 2023-12-12 RX ADMIN — DIPHENHYDRAMINE HYDROCHLORIDE 50 MG: 50 INJECTION INTRAMUSCULAR; INTRAVENOUS at 12:15

## 2023-12-12 RX ADMIN — HEPARIN 500 UNITS: 100 SYRINGE at 15:39

## 2023-12-12 RX ADMIN — FAMOTIDINE 20 MG: 10 INJECTION INTRAVENOUS at 12:15

## 2023-12-12 RX ADMIN — TRASTUZUMAB-ANNS 111 MG: 150 INJECTION, POWDER, LYOPHILIZED, FOR SOLUTION INTRAVENOUS at 12:53

## 2023-12-12 ASSESSMENT — PAIN SCALES - GENERAL: PAINLEVEL: 0-NO PAIN

## 2023-12-12 ASSESSMENT — ENCOUNTER SYMPTOMS
DEPRESSION: 0
LOSS OF SENSATION IN FEET: 0
OCCASIONAL FEELINGS OF UNSTEADINESS: 0

## 2023-12-12 NOTE — PROGRESS NOTES
Patient ID: Zina Hernandez is a 68 y.o. female.    The patient presents to clinic today for her history of breast cancer.     Cancer Staging   No matching staging information was found for the patient.      Diagnostic/Therapeutic History:    The patient presents to clinic today for her history of breast cancer.     Diagnostic/Therapeutic History:  Cancer History:          Breast         AJCC Edition: 8th (AJCC), Diagnosis Date: 30-Aug-2023, IA, pT1c pN0 cM0 G2     Treatment Synopsis:    - On 6/14/2023 screening mammogram she had 2 adjacent irregular spiculated masses in the upper outer left breast at middle depth.  No suspicious masses or calcification  in the right breast.  BI-RADS Category 0     -On 6/28/2023 she had targeted ultrasound that showed at 1:00, 4 cm from the nipple an irregular hypoechoic mass measuring 3 x 5 x 4 mm this corresponds to the mammographic finding.  Left axilla showed 4 morphologically normal lymph nodes without lymphadenopathy      -On 7/14/2023 she had left breast mass ultrasound guided core needle  invasive lobular carcinoma, grade 2 with  ER 95%, AK 20%, HER2 positive 3+, also had lobular carcinoma in situ     -On 8/30/2023 Dr. Blanchard performed left partial mastectomy with sentinel lymph node biopsy (0/1) 2 foci of cancer largest 1 measuring 13 mm, second 1 measuring 7 mm, margins were negative final stage [id number]     History of Present Illness (HPI)/Interval History:    No fever, no chills   Appetite okay  No chest pain, no shortness of breath, no nausea, no vomiting   No urinary symptoms  No numbness or tingling        14 point review of system otherwise unremarkable     PMH: as above     Meds: allergies reviewed      Reproductive history: Menarche at 13, AFLB: 27,  menopause at 50, no HRT     Family history maternal grandmother with uterine cancer in her 60s    She denies any fevers or chills.      Review of Systems:  14-point ROS otherwise negative, as per HPI.    Past Medical  History:   Diagnosis Date    Cancer (CMS/HCC)     Migraine        Past Surgical History:   Procedure Laterality Date    BREAST LUMPECTOMY         Social History     Socioeconomic History    Marital status:      Spouse name: None    Number of children: None    Years of education: None    Highest education level: None   Occupational History    None   Tobacco Use    Smoking status: Never    Smokeless tobacco: Never   Substance and Sexual Activity    Alcohol use: Never    Drug use: Never    Sexual activity: None   Other Topics Concern    None   Social History Narrative    None     Social Determinants of Health     Financial Resource Strain: Not on file   Food Insecurity: Not on file   Transportation Needs: Not on file   Physical Activity: Not on file   Stress: Not on file   Social Connections: Not on file   Intimate Partner Violence: Not on file   Housing Stability: Not on file       Allergies   Allergen Reactions    Paclitaxel Other     Back pain and chest pain.     Tamiflu [Oseltamivir] Unknown         Current Outpatient Medications:     dexAMETHasone 0.5 mg/5 mL oral liquid, Take 80 mL (8 mg) by mouth 2 times a day for 1 day., Disp: 160 mL, Rfl: 0    dexAMETHasone 0.5 mg/5 mL oral liquid, Take 40 mL (4 mg) by mouth 2 times a day for 20 days., Disp: 1600 mL, Rfl: 0    loperamide (Imodium A-D) 1 mg/7.5 mL liquid, Take 15 mL (2 mg) by mouth., Disp: , Rfl:     magic mouthwash (lidocaine, diphenhydrAMINE, Maalox 1:1:1), Swish and spit 5 mL every 6 hours if needed for mucositis., Disp: 178 mL, Rfl: 1     Objective    BSA: 1.53 meters squared  /74 (BP Location: Left arm, Patient Position: Sitting)   Pulse 85   Temp 36.9 °C (98.4 °F) (Temporal)   Resp 16   Wt 56.1 kg (123 lb 10.9 oz)   SpO2 96%   BMI 24.57 kg/m²     ECOG Performance Status: 0    Physical Exam    Constitutional: Well developed, awake/alert/oriented  x3, no distress, alert and cooperative   Eyes: PERRL, EOMI, clear sclera   ENMT: mucous  membranes moist, no apparent injury,  no lesions seen   Head/Neck: Neck supple, no apparent injury, thyroid  without mass or tenderness, No JVD, trachea midline, no bruits   Respiratory/Thorax: Patent airways, CTAB, normal  breath sounds with good chest expansion, thorax symmetric   Cardiovascular: Regular, rate and rhythm, no murmurs,  2+ equal pulses of the extremities, normal S 1and S 2   Gastrointestinal: Nondistended, soft, non-tender,  no rebound tenderness or guarding, no masses palpable, no organomegaly, +BS, no bruits   Extremities: normal extremities, no cyanosis edema,  contusions or wounds, no clubbing   Breast: Left surgical incision healing well, +seroma,  no new nodules or lymphadenopathy.  No right breast nodules or lymphadenopathy        Laboratory Data:  Lab Results   Component Value Date    WBC 6.6 12/05/2023    HGB 9.9 (L) 12/05/2023    HCT 30.6 (L) 12/05/2023    MCV 90 12/05/2023     12/05/2023    ANC 2.31 11/21/2023       Chemistry    Lab Results   Component Value Date/Time     12/05/2023 1132    K 3.7 12/05/2023 1132     12/05/2023 1132    CO2 28 12/05/2023 1132    BUN 28 (H) 12/05/2023 1132    CREATININE 0.80 12/05/2023 1132    Lab Results   Component Value Date/Time    CALCIUM 9.2 12/05/2023 1132    ALKPHOS 70 12/05/2023 1132    AST 20 12/05/2023 1132    ALT 24 12/05/2023 1132    BILITOT 0.2 12/05/2023 1132             Radiology:  Onco-Echo Complete (Strain & 3D)      [facility], [address], [place] [address]Vff [phone] and                                  Fax [phone]    TRANSTHORACIC ECHOCARDIOGRAM REPORT       Patient Name:      HUNTER Neri Physician:    [id number] John Garcia DO  Study Date:        10/13/2023           Ordering Provider:    [id number] GÉNESIS WOODS  MRN/PID:           [id number]              Fellow:  Accession#:        [id number]         Nurse:  Date of Birth/Age: 1955 / 68 years Sonographer:          Lindsay Marshall RDCS, RCS  Gender:            F                    Additional Staff:  Height:            149.86 cm            Admit Date:           10/13/2023  Weight:            55.34 kg             Admission Status:     Outpatient  BSA:               1.49 m2              Encounter#:           [id number]                                          Department [facility]  [facility]  Blood Pressure: 120 /83 mmHg    Study Type:    ONCO-ECHO COMPLETE (STRAIN AND 3D)  Diagnosis/ICD: Other long term (current) drug therapy-Z79.899; Encounter for                 monitoring cardiotoxic drug therapy-Z51.81; Malignant neoplasm of                 unspecified site of left female breast-C50.912  Indication:    Cardiotoxic chemo, breast cancer (L)  CPT Code:      Echo Complete w Full Doppler-60298; Myocardial Strain                 Imaging-27177   Study Detail: The following Echo studies were performed: 2D, M-Mode, Doppler,                color flow and Strain.       PHYSICIAN INTERPRETATION:  Left Ventricle: The left ventricular systolic function is normal, with an estimated ejection fraction of 65-70%. There are no regional wall motion abnormalities. The left ventricular cavity size is normal. Spectral Doppler shows a normal pattern of left ventricular diastolic filling. Strain values are normal, which imply normal myocardial function.  Left Atrium: The left atrium is normal in size.  Right Ventricle: The right ventricle is normal in size. There is normal right ventricular global systolic function.  Right Atrium: The right atrium is normal in size.  Aortic Valve: The aortic valve is trileaflet. There is mild aortic valve thickening. There is trivial aortic valve regurgitation. The peak instantaneous gradient of the aortic valve is 5.6 mmHg. The mean  gradient of the aortic valve is 2.7 mmHg.  Mitral Valve: The mitral valve is mildly thickened. There is mild mitral valve regurgitation.  Tricuspid Valve: The tricuspid valve is structurally normal. There is trace to mild tricuspid regurgitation. The Doppler estimated RVSP is within normal limits at 18.5 mmHg.  Pulmonic Valve: The pulmonic valve is structurally normal. There is mild pulmonic valve regurgitation.  Pericardium: There is no pericardial effusion noted.  Aorta: The aortic root is normal.  Pulmonary Artery: The main pulmonary artery is normal in size, and position, with normal bifurcation into the left and right pulmonary arteries.  Systemic Veins: The inferior vena cava size appears small.     Onco-Cardiology Measurements:  Current Measurements  2D EF (Biplane)                     67%  Global Longitudinal Strain (GLS) -20.7%    GLS Tracking Quality: Fair       CONCLUSIONS:   1. Left ventricular systolic function is normal with a 65-70% estimated ejection fraction.   2. RVSP within normal limits.   3. Strain values are normal, which imply normal myocardial function.    QUANTITATIVE DATA SUMMARY:  2D MEASUREMENTS:                           Normal Ranges:  LAs:           3.06 cm   (2.7-4.0cm)  RVIDd:         2.64 cm   (0.9-3.6cm)  IVSd:          0.93 cm   (0.6-1.1cm)  LVPWd:         0.79 cm   (0.6-1.1cm)  LVIDd:         3.82 cm   (3.9-5.9cm)  LVIDs:         2.51 cm  LV Mass Index: 63.9 g/m2  LV % FS        34.3 %    LA VOLUME:                              Normal Ranges:  LA Area A4C:     14.4 cm2  LA Area A2C:     14.3 cm2  LA Volume Index: 27.1 ml/m2  LA Vol A4C:      39.1 ml  LA Vol A2C:      32.1 ml    RA VOLUME BY A/L METHOD:                        Normal Ranges:  RA Area A4C: 11.9 cm2    M-MODE MEASUREMENTS:                   Normal Ranges:  Ao Root: 3.40 cm (2.0-3.7cm)  AoV Exc: 1.86 cm (1.5-2.5cm)    AORTA MEASUREMENTS:                     Normal Ranges:  AoV Exc:   1.86 cm (1.5-2.5cm)  Asc Ao, d:  3.00 cm (2.1-3.4cm)    LV SYSTOLIC FUNCTION BY 2D PLANIMETRY (MOD):                      Normal Ranges:  EF-A4C View: 66.2 % (>=55%)  EF-A2C View: 65.6 %  EF-Biplane:  67.0 %    LV DIASTOLIC FUNCTION:                         Normal Ranges:  MV Peak E:    0.93 m/s (0.7-1.2 m/s)  MV Peak A:    0.75 m/s (0.42-0.7 m/s)  E/A Ratio:    1.24     (1.0-2.2)  MV lateral e' 0.11 m/s  MV medial e'  0.10 m/s    MITRAL VALVE:                  Normal Ranges:  MV DT: 197 msec (150-240msec)    AORTIC VALVE:                                    Normal Ranges:  AoV Vmax:                1.18 m/s (<=1.7m/s)  AoV Peak P.6 mmHg (<20mmHg)  AoV Mean P.7 mmHg (1.7-11.5mmHg)  LVOT Max Steve:            0.83 m/s (<=1.1m/s)  AoV VTI:                 23.52 cm (18-25cm)  LVOT VTI:                16.63 cm  LVOT Diameter:           1.95 cm  (1.8-2.4cm)  AoV Area, VTI:           2.12 cm2 (2.5-5.5cm2)  AoV Area,Vmax:           2.11 cm2 (2.5-4.5cm2)  AoV Dimensionless Index: 0.71       RIGHT VENTRICLE:  RV Basal 3.00 cm  RV Mid   2.60 cm  RV Major 5.3 cm  TAPSE:   17.0 mm  RV s'    0.13 m/s    TRICUSPID VALVE/RVSP:                              Normal Ranges:  Peak TR Velocity: 1.97 m/s  RV Syst Pressure: 18.5 mmHg (< 30mmHg)  IVC Diam:         1.10 cm    AORTA:  Asc Ao Diam 3.00 cm       [id number] John Garcia DO  Electronically signed on 10/13/2023 at 5:05:31 PM       ** Final **       BI mammo bilateral screening tomosynthesis 2023    Narrative  Interpreted By:  ENZO MANUEL MD  MRN: [id number]  Patient Name: SPARINO, HUNTER    STUDY:  DIGITAL MAMM SCREENING W/ RUBI;  2023 8:00 am    ACCESSION NUMBER(S):  [id number]    ORDERING CLINICIAN:  KATIE HINOJOSA    INDICATION:  Screening.    COMPARISON:  2021, 2020, 2019    FINDINGS:  2D and tomosynthesis images were reviewed at 1 mm slice thickness.    There are areas of scattered fibroglandular tissue.  There are 2  adjacent irregular  spiculated equal density masses in the upper outer  left breast at middle depth. No suspicious masses or calcifications  are identified in the right breast.    Impression  No mammographic evidence of malignancy in the right breast. Left  breast mass x2.    BI-RADS CATEGORY:    Category: 0 - Incomplete; Need Additional Imaging Evaluation and/or  Prior Mammograms for Comparison.  Recommendation: Ultrasound Recommended.    For any future breast imaging appointments, please call [phone]  ([phone]).    Patient letter sent SADEVAL          Assessment/Plan:    68-year-old female previously healthy found to have on screening mammogram 2 adjacent irregular spiculated masses in the upper outer left breast at middle depth with  US showing a 5mm mass with left axilla showing 4 negative LN s/p  guided core needle  invasive lobular carcinoma, grade 2 with  ER 95%, MD 20%, HER2 positive 3+, also had lobular carcinoma in situ. Dr. Blanchard performed left partial mastectomy with sentinel  lymph node biopsy (0/1) 2 foci of cancer largest 1 measuring 13 mm, second 1 measuring 7 mm, margins were negative final stage PT1CN0. She is presenting to discuss adjuvant treatment options.      I reviewed with her the events that led to her diagnosis of breast cancer. We reviewed all the procedures and diagnostic imaging she underwent thus far. I discussed the features of her breast cancer that include the size, grade, lymph node status and  hormone receptor/ her2-ying status.     Based on APT trial recommended to proceed with THx12 weeks followed by radiation followed by hormonal therapy and herceptin to complete for a total of 1 year     Based on the updated 10 year results of the APT trial published in the lancet in March of this year:      410 patients were enrolled and 406 were given adjuvant paclitaxel and trastuzumab and included in the analysis. Mean age at enrolment was 55 years (SD 10·5), 405 (99·8%) of 406 patients were female and one  (0·2%) was male, 350 (86·2%) were  White, 28 (6·9%) were Black or , and 272 (67·0%) had hormone receptor-positive disease. After a median follow-up of 10·8 years (IQR 7·1-11·4), among 406 patients included in the analysis population, we observed 31  invasive disease-free survival events, of which six (19·4%) were locoregional ipsilateral recurrences, nine (29·0%) were new contralateral breast cancers, six (19·4%) were distant recurrences, and ten (32·3%) were all-cause deaths.  10-year invasive disease-free survival was 91·3% (95% CI 88·3-94·4), 10-year recurrence-free interval was 96·3% (95% CI 94·3-98·3), 10-year overall survival was 94·3% (95% CI 91·8-96·8), and 10-year breast  cancer-specific survival was 98·8% (95% CI 97·6-100)       Disposition.  - Echocardiogram looked good- referral to Onc-cards in [place]  - proceed with taxol today- last taxol herceptin on 01/02  - Radiation therapy after chemotherapy    RTC on 01/16/2023        Cony Amin MD  Hematology and Medical Oncology  [facility]

## 2023-12-12 NOTE — SIGNIFICANT EVENT
12/12/23 1152   Prechemo Checklist   Has the patient been in the hospital, ED, or urgent care since last date of service No   Chemo/Immuno Consent Signed Yes   Protocol/Indications Verified Yes   Confirmed to previous date/time of medication Yes   Compared to previous dose Yes   All medications are dated accurately Yes   Pregnancy Test Negative Not applicable   Parameters Met Yes   BSA/Weight-Height Verified Yes   Dose Calculations Verified Yes

## 2023-12-18 ENCOUNTER — APPOINTMENT (OUTPATIENT)
Dept: HEMATOLOGY/ONCOLOGY | Facility: CLINIC | Age: 68
End: 2023-12-18
Payer: MEDICARE

## 2023-12-19 ENCOUNTER — INFUSION (OUTPATIENT)
Dept: HEMATOLOGY/ONCOLOGY | Facility: CLINIC | Age: 68
End: 2023-12-19
Payer: MEDICARE

## 2023-12-19 VITALS
TEMPERATURE: 98.1 F | SYSTOLIC BLOOD PRESSURE: 145 MMHG | HEART RATE: 87 BPM | RESPIRATION RATE: 18 BRPM | DIASTOLIC BLOOD PRESSURE: 77 MMHG | WEIGHT: 124.12 LBS | OXYGEN SATURATION: 98 % | BODY MASS INDEX: 24.66 KG/M2

## 2023-12-19 DIAGNOSIS — C50.912 INVASIVE LOBULAR CARCINOMA OF LEFT BREAST IN FEMALE (MULTI): ICD-10-CM

## 2023-12-19 LAB
ALBUMIN SERPL BCP-MCNC: 4.1 G/DL (ref 3.4–5)
ALP SERPL-CCNC: 73 U/L (ref 33–136)
ALT SERPL W P-5'-P-CCNC: 34 U/L (ref 7–45)
ANION GAP SERPL CALC-SCNC: 13 MMOL/L (ref 10–20)
AST SERPL W P-5'-P-CCNC: 27 U/L (ref 9–39)
BASOPHILS # BLD AUTO: 0.01 X10*3/UL (ref 0–0.1)
BASOPHILS NFR BLD AUTO: 0.2 %
BILIRUB SERPL-MCNC: 0.3 MG/DL (ref 0–1.2)
BUN SERPL-MCNC: 18 MG/DL (ref 6–23)
CALCIUM SERPL-MCNC: 9.5 MG/DL (ref 8.6–10.3)
CHLORIDE SERPL-SCNC: 102 MMOL/L (ref 98–107)
CO2 SERPL-SCNC: 27 MMOL/L (ref 21–32)
CREAT SERPL-MCNC: 0.8 MG/DL (ref 0.5–1.05)
EOSINOPHIL # BLD AUTO: 0 X10*3/UL (ref 0–0.7)
EOSINOPHIL NFR BLD AUTO: 0 %
ERYTHROCYTE [DISTWIDTH] IN BLOOD BY AUTOMATED COUNT: 16.9 % (ref 11.5–14.5)
GFR SERPL CREATININE-BSD FRML MDRD: 80 ML/MIN/1.73M*2
GLUCOSE SERPL-MCNC: 103 MG/DL (ref 74–99)
HCT VFR BLD AUTO: 32.1 % (ref 36–46)
HGB BLD-MCNC: 10.5 G/DL (ref 12–16)
IMM GRANULOCYTES # BLD AUTO: 0.08 X10*3/UL (ref 0–0.7)
IMM GRANULOCYTES NFR BLD AUTO: 1.4 % (ref 0–0.9)
LYMPHOCYTES # BLD AUTO: 1 X10*3/UL (ref 1.2–4.8)
LYMPHOCYTES NFR BLD AUTO: 17.3 %
MCH RBC QN AUTO: 29.5 PG (ref 26–34)
MCHC RBC AUTO-ENTMCNC: 32.7 G/DL (ref 32–36)
MCV RBC AUTO: 90 FL (ref 80–100)
MONOCYTES # BLD AUTO: 0.44 X10*3/UL (ref 0.1–1)
MONOCYTES NFR BLD AUTO: 7.6 %
NEUTROPHILS # BLD AUTO: 4.26 X10*3/UL (ref 1.2–7.7)
NEUTROPHILS NFR BLD AUTO: 73.5 %
NRBC BLD-RTO: ABNORMAL /100{WBCS}
PLATELET # BLD AUTO: 279 X10*3/UL (ref 150–450)
POTASSIUM SERPL-SCNC: 3.8 MMOL/L (ref 3.5–5.3)
PROT SERPL-MCNC: 7 G/DL (ref 6.4–8.2)
RBC # BLD AUTO: 3.56 X10*6/UL (ref 4–5.2)
SODIUM SERPL-SCNC: 138 MMOL/L (ref 136–145)
WBC # BLD AUTO: 5.8 X10*3/UL (ref 4.4–11.3)

## 2023-12-19 PROCEDURE — 96375 TX/PRO/DX INJ NEW DRUG ADDON: CPT | Mod: INF

## 2023-12-19 PROCEDURE — 96413 CHEMO IV INFUSION 1 HR: CPT

## 2023-12-19 PROCEDURE — 85025 COMPLETE CBC W/AUTO DIFF WBC: CPT

## 2023-12-19 PROCEDURE — 2500000004 HC RX 250 GENERAL PHARMACY W/ HCPCS (ALT 636 FOR OP/ED): Performed by: STUDENT IN AN ORGANIZED HEALTH CARE EDUCATION/TRAINING PROGRAM

## 2023-12-19 PROCEDURE — 96417 CHEMO IV INFUS EACH ADDL SEQ: CPT

## 2023-12-19 PROCEDURE — 80053 COMPREHEN METABOLIC PANEL: CPT

## 2023-12-19 RX ORDER — PROCHLORPERAZINE EDISYLATE 5 MG/ML
10 INJECTION INTRAMUSCULAR; INTRAVENOUS EVERY 6 HOURS PRN
Status: DISCONTINUED | OUTPATIENT
Start: 2023-12-19 | End: 2023-12-19 | Stop reason: HOSPADM

## 2023-12-19 RX ORDER — EPINEPHRINE 0.3 MG/.3ML
0.3 INJECTION SUBCUTANEOUS EVERY 5 MIN PRN
Status: DISCONTINUED | OUTPATIENT
Start: 2023-12-19 | End: 2023-12-19 | Stop reason: HOSPADM

## 2023-12-19 RX ORDER — PROCHLORPERAZINE MALEATE 10 MG
10 TABLET ORAL EVERY 6 HOURS PRN
Status: DISCONTINUED | OUTPATIENT
Start: 2023-12-19 | End: 2023-12-19 | Stop reason: HOSPADM

## 2023-12-19 RX ORDER — HEPARIN 100 UNIT/ML
500 SYRINGE INTRAVENOUS AS NEEDED
Status: CANCELLED | OUTPATIENT
Start: 2023-12-19

## 2023-12-19 RX ORDER — HEPARIN SODIUM,PORCINE/PF 10 UNIT/ML
50 SYRINGE (ML) INTRAVENOUS AS NEEDED
Status: DISCONTINUED | OUTPATIENT
Start: 2023-12-19 | End: 2023-12-19 | Stop reason: HOSPADM

## 2023-12-19 RX ORDER — HEPARIN 100 UNIT/ML
500 SYRINGE INTRAVENOUS AS NEEDED
Status: DISCONTINUED | OUTPATIENT
Start: 2023-12-19 | End: 2023-12-19 | Stop reason: HOSPADM

## 2023-12-19 RX ORDER — DIPHENHYDRAMINE HYDROCHLORIDE 50 MG/ML
50 INJECTION INTRAMUSCULAR; INTRAVENOUS ONCE
Status: COMPLETED | OUTPATIENT
Start: 2023-12-19 | End: 2023-12-19

## 2023-12-19 RX ORDER — DIPHENHYDRAMINE HYDROCHLORIDE 50 MG/ML
50 INJECTION INTRAMUSCULAR; INTRAVENOUS AS NEEDED
Status: DISCONTINUED | OUTPATIENT
Start: 2023-12-19 | End: 2023-12-19 | Stop reason: HOSPADM

## 2023-12-19 RX ORDER — FAMOTIDINE 10 MG/ML
20 INJECTION INTRAVENOUS ONCE
Status: COMPLETED | OUTPATIENT
Start: 2023-12-19 | End: 2023-12-19

## 2023-12-19 RX ORDER — HEPARIN SODIUM,PORCINE/PF 10 UNIT/ML
50 SYRINGE (ML) INTRAVENOUS AS NEEDED
Status: CANCELLED | OUTPATIENT
Start: 2023-12-19

## 2023-12-19 RX ORDER — FAMOTIDINE 10 MG/ML
20 INJECTION INTRAVENOUS ONCE AS NEEDED
Status: DISCONTINUED | OUTPATIENT
Start: 2023-12-19 | End: 2023-12-19 | Stop reason: HOSPADM

## 2023-12-19 RX ORDER — ALBUTEROL SULFATE 0.83 MG/ML
3 SOLUTION RESPIRATORY (INHALATION) AS NEEDED
Status: DISCONTINUED | OUTPATIENT
Start: 2023-12-19 | End: 2023-12-19 | Stop reason: HOSPADM

## 2023-12-19 RX ORDER — DEXAMETHASONE SODIUM PHOSPHATE 100 MG/10ML
10 INJECTION INTRAMUSCULAR; INTRAVENOUS ONCE
Status: COMPLETED | OUTPATIENT
Start: 2023-12-19 | End: 2023-12-19

## 2023-12-19 RX ADMIN — DEXAMETHASONE SODIUM PHOSPHATE 10 MG: 10 INJECTION INTRAMUSCULAR; INTRAVENOUS at 09:51

## 2023-12-19 RX ADMIN — DIPHENHYDRAMINE HYDROCHLORIDE 50 MG: 50 INJECTION INTRAMUSCULAR; INTRAVENOUS at 09:51

## 2023-12-19 RX ADMIN — HEPARIN 500 UNITS: 100 SYRINGE at 13:30

## 2023-12-19 RX ADMIN — FAMOTIDINE 20 MG: 10 INJECTION INTRAVENOUS at 09:51

## 2023-12-19 RX ADMIN — PACLITAXEL 90 MG: 6 INJECTION, SOLUTION, CONCENTRATE INTRAVENOUS at 11:36

## 2023-12-19 RX ADMIN — TRASTUZUMAB-ANNS 111 MG: 150 INJECTION, POWDER, LYOPHILIZED, FOR SOLUTION INTRAVENOUS at 10:50

## 2023-12-19 ASSESSMENT — PAIN SCALES - GENERAL: PAINLEVEL: 0-NO PAIN

## 2023-12-19 NOTE — SIGNIFICANT EVENT
12/19/23 0907   Prechemo Checklist   Has the patient been in the hospital, ED, or urgent care since last date of service No   Chemo/Immuno Consent Signed Yes   Protocol/Indications Verified Yes   Confirmed to previous date/time of medication Yes   Compared to previous dose Yes   All medications are dated accurately Yes   Pregnancy Test Negative Not applicable   Parameters Met Yes   BSA/Weight-Height Verified Yes   Dose Calculations Verified Yes

## 2023-12-26 ENCOUNTER — INFUSION (OUTPATIENT)
Dept: HEMATOLOGY/ONCOLOGY | Facility: CLINIC | Age: 68
End: 2023-12-26
Payer: MEDICARE

## 2023-12-26 VITALS
TEMPERATURE: 97.3 F | SYSTOLIC BLOOD PRESSURE: 151 MMHG | DIASTOLIC BLOOD PRESSURE: 77 MMHG | OXYGEN SATURATION: 96 % | WEIGHT: 124 LBS | HEART RATE: 89 BPM | BODY MASS INDEX: 24.64 KG/M2 | RESPIRATION RATE: 16 BRPM

## 2023-12-26 DIAGNOSIS — C50.912 INVASIVE LOBULAR CARCINOMA OF LEFT BREAST IN FEMALE (MULTI): ICD-10-CM

## 2023-12-26 LAB
ALBUMIN SERPL BCP-MCNC: 4.1 G/DL (ref 3.4–5)
ALP SERPL-CCNC: 75 U/L (ref 33–136)
ALT SERPL W P-5'-P-CCNC: 56 U/L (ref 7–45)
ANION GAP SERPL CALC-SCNC: 13 MMOL/L (ref 10–20)
AST SERPL W P-5'-P-CCNC: 42 U/L (ref 9–39)
BASOPHILS # BLD AUTO: 0.01 X10*3/UL (ref 0–0.1)
BASOPHILS NFR BLD AUTO: 0.2 %
BILIRUB SERPL-MCNC: 0.3 MG/DL (ref 0–1.2)
BUN SERPL-MCNC: 20 MG/DL (ref 6–23)
CALCIUM SERPL-MCNC: 9.4 MG/DL (ref 8.6–10.3)
CHLORIDE SERPL-SCNC: 103 MMOL/L (ref 98–107)
CO2 SERPL-SCNC: 27 MMOL/L (ref 21–32)
CREAT SERPL-MCNC: 0.84 MG/DL (ref 0.5–1.05)
EOSINOPHIL # BLD AUTO: 0 X10*3/UL (ref 0–0.7)
EOSINOPHIL NFR BLD AUTO: 0 %
ERYTHROCYTE [DISTWIDTH] IN BLOOD BY AUTOMATED COUNT: 17.2 % (ref 11.5–14.5)
GFR SERPL CREATININE-BSD FRML MDRD: 76 ML/MIN/1.73M*2
GLUCOSE SERPL-MCNC: 107 MG/DL (ref 74–99)
HCT VFR BLD AUTO: 30.4 % (ref 36–46)
HGB BLD-MCNC: 9.9 G/DL (ref 12–16)
IMM GRANULOCYTES # BLD AUTO: 0.05 X10*3/UL (ref 0–0.7)
IMM GRANULOCYTES NFR BLD AUTO: 1 % (ref 0–0.9)
LYMPHOCYTES # BLD AUTO: 1.02 X10*3/UL (ref 1.2–4.8)
LYMPHOCYTES NFR BLD AUTO: 20.5 %
MCH RBC QN AUTO: 29.8 PG (ref 26–34)
MCHC RBC AUTO-ENTMCNC: 32.6 G/DL (ref 32–36)
MCV RBC AUTO: 92 FL (ref 80–100)
MONOCYTES # BLD AUTO: 0.41 X10*3/UL (ref 0.1–1)
MONOCYTES NFR BLD AUTO: 8.2 %
NEUTROPHILS # BLD AUTO: 3.48 X10*3/UL (ref 1.2–7.7)
NEUTROPHILS NFR BLD AUTO: 70.1 %
NRBC BLD-RTO: ABNORMAL /100{WBCS}
PLATELET # BLD AUTO: 303 X10*3/UL (ref 150–450)
POTASSIUM SERPL-SCNC: 3.8 MMOL/L (ref 3.5–5.3)
PROT SERPL-MCNC: 6.9 G/DL (ref 6.4–8.2)
RBC # BLD AUTO: 3.32 X10*6/UL (ref 4–5.2)
SODIUM SERPL-SCNC: 139 MMOL/L (ref 136–145)
WBC # BLD AUTO: 5 X10*3/UL (ref 4.4–11.3)

## 2023-12-26 PROCEDURE — 96415 CHEMO IV INFUSION ADDL HR: CPT

## 2023-12-26 PROCEDURE — 2500000004 HC RX 250 GENERAL PHARMACY W/ HCPCS (ALT 636 FOR OP/ED): Performed by: STUDENT IN AN ORGANIZED HEALTH CARE EDUCATION/TRAINING PROGRAM

## 2023-12-26 PROCEDURE — 80053 COMPREHEN METABOLIC PANEL: CPT

## 2023-12-26 PROCEDURE — 85025 COMPLETE CBC W/AUTO DIFF WBC: CPT

## 2023-12-26 PROCEDURE — 96375 TX/PRO/DX INJ NEW DRUG ADDON: CPT | Mod: INF

## 2023-12-26 PROCEDURE — 96413 CHEMO IV INFUSION 1 HR: CPT

## 2023-12-26 PROCEDURE — 96417 CHEMO IV INFUS EACH ADDL SEQ: CPT

## 2023-12-26 RX ORDER — DIPHENHYDRAMINE HYDROCHLORIDE 50 MG/ML
50 INJECTION INTRAMUSCULAR; INTRAVENOUS AS NEEDED
Status: DISCONTINUED | OUTPATIENT
Start: 2023-12-26 | End: 2023-12-26 | Stop reason: HOSPADM

## 2023-12-26 RX ORDER — HEPARIN SODIUM,PORCINE/PF 10 UNIT/ML
50 SYRINGE (ML) INTRAVENOUS AS NEEDED
Status: CANCELLED | OUTPATIENT
Start: 2023-12-26

## 2023-12-26 RX ORDER — PROCHLORPERAZINE MALEATE 10 MG
10 TABLET ORAL EVERY 6 HOURS PRN
Status: DISCONTINUED | OUTPATIENT
Start: 2023-12-26 | End: 2023-12-26 | Stop reason: HOSPADM

## 2023-12-26 RX ORDER — DIPHENHYDRAMINE HYDROCHLORIDE 50 MG/ML
50 INJECTION INTRAMUSCULAR; INTRAVENOUS ONCE
Status: COMPLETED | OUTPATIENT
Start: 2023-12-26 | End: 2023-12-26

## 2023-12-26 RX ORDER — ALBUTEROL SULFATE 0.83 MG/ML
3 SOLUTION RESPIRATORY (INHALATION) AS NEEDED
Status: DISCONTINUED | OUTPATIENT
Start: 2023-12-26 | End: 2023-12-26 | Stop reason: HOSPADM

## 2023-12-26 RX ORDER — FAMOTIDINE 10 MG/ML
20 INJECTION INTRAVENOUS ONCE
Status: COMPLETED | OUTPATIENT
Start: 2023-12-26 | End: 2023-12-26

## 2023-12-26 RX ORDER — HEPARIN 100 UNIT/ML
500 SYRINGE INTRAVENOUS AS NEEDED
Status: DISCONTINUED | OUTPATIENT
Start: 2023-12-26 | End: 2023-12-26 | Stop reason: HOSPADM

## 2023-12-26 RX ORDER — DEXAMETHASONE SODIUM PHOSPHATE 100 MG/10ML
10 INJECTION INTRAMUSCULAR; INTRAVENOUS ONCE
Status: COMPLETED | OUTPATIENT
Start: 2023-12-26 | End: 2023-12-26

## 2023-12-26 RX ORDER — EPINEPHRINE 0.3 MG/.3ML
0.3 INJECTION SUBCUTANEOUS EVERY 5 MIN PRN
Status: DISCONTINUED | OUTPATIENT
Start: 2023-12-26 | End: 2023-12-26 | Stop reason: HOSPADM

## 2023-12-26 RX ORDER — HEPARIN 100 UNIT/ML
500 SYRINGE INTRAVENOUS AS NEEDED
Status: CANCELLED | OUTPATIENT
Start: 2023-12-26

## 2023-12-26 RX ORDER — PROCHLORPERAZINE EDISYLATE 5 MG/ML
10 INJECTION INTRAMUSCULAR; INTRAVENOUS EVERY 6 HOURS PRN
Status: DISCONTINUED | OUTPATIENT
Start: 2023-12-26 | End: 2023-12-26 | Stop reason: HOSPADM

## 2023-12-26 RX ORDER — FAMOTIDINE 10 MG/ML
20 INJECTION INTRAVENOUS ONCE AS NEEDED
Status: DISCONTINUED | OUTPATIENT
Start: 2023-12-26 | End: 2023-12-26 | Stop reason: HOSPADM

## 2023-12-26 RX ADMIN — TRASTUZUMAB-ANNS 111 MG: 150 INJECTION, POWDER, LYOPHILIZED, FOR SOLUTION INTRAVENOUS at 10:42

## 2023-12-26 RX ADMIN — HEPARIN 500 UNITS: 100 SYRINGE at 13:14

## 2023-12-26 RX ADMIN — FAMOTIDINE 20 MG: 10 INJECTION INTRAVENOUS at 09:56

## 2023-12-26 RX ADMIN — DEXAMETHASONE SODIUM PHOSPHATE 10 MG: 10 INJECTION INTRAMUSCULAR; INTRAVENOUS at 09:56

## 2023-12-26 RX ADMIN — DIPHENHYDRAMINE HYDROCHLORIDE 50 MG: 50 INJECTION INTRAMUSCULAR; INTRAVENOUS at 09:55

## 2023-12-26 RX ADMIN — PACLITAXEL 90 MG: 6 INJECTION, SOLUTION, CONCENTRATE INTRAVENOUS at 11:27

## 2023-12-26 ASSESSMENT — PAIN SCALES - GENERAL: PAINLEVEL: 0-NO PAIN

## 2024-01-02 ENCOUNTER — INFUSION (OUTPATIENT)
Dept: HEMATOLOGY/ONCOLOGY | Facility: CLINIC | Age: 69
End: 2024-01-02
Payer: MEDICARE

## 2024-01-02 VITALS
HEART RATE: 90 BPM | RESPIRATION RATE: 16 BRPM | DIASTOLIC BLOOD PRESSURE: 75 MMHG | WEIGHT: 124.8 LBS | SYSTOLIC BLOOD PRESSURE: 133 MMHG | BODY MASS INDEX: 24.79 KG/M2 | TEMPERATURE: 97 F | OXYGEN SATURATION: 98 %

## 2024-01-02 DIAGNOSIS — C50.912 INVASIVE LOBULAR CARCINOMA OF LEFT BREAST IN FEMALE (MULTI): ICD-10-CM

## 2024-01-02 LAB
ALBUMIN SERPL BCP-MCNC: 4 G/DL (ref 3.4–5)
ALP SERPL-CCNC: 74 U/L (ref 33–136)
ALT SERPL W P-5'-P-CCNC: 44 U/L (ref 7–45)
ANION GAP SERPL CALC-SCNC: 13 MMOL/L (ref 10–20)
AST SERPL W P-5'-P-CCNC: 30 U/L (ref 9–39)
BASOPHILS # BLD AUTO: 0.01 X10*3/UL (ref 0–0.1)
BASOPHILS NFR BLD AUTO: 0.2 %
BILIRUB SERPL-MCNC: 0.3 MG/DL (ref 0–1.2)
BUN SERPL-MCNC: 23 MG/DL (ref 6–23)
CALCIUM SERPL-MCNC: 9.4 MG/DL (ref 8.6–10.3)
CHLORIDE SERPL-SCNC: 102 MMOL/L (ref 98–107)
CO2 SERPL-SCNC: 26 MMOL/L (ref 21–32)
CREAT SERPL-MCNC: 0.84 MG/DL (ref 0.5–1.05)
EOSINOPHIL # BLD AUTO: 0 X10*3/UL (ref 0–0.7)
EOSINOPHIL NFR BLD AUTO: 0 %
ERYTHROCYTE [DISTWIDTH] IN BLOOD BY AUTOMATED COUNT: 17 % (ref 11.5–14.5)
GFR SERPL CREATININE-BSD FRML MDRD: 76 ML/MIN/1.73M*2
GLUCOSE SERPL-MCNC: 141 MG/DL (ref 74–99)
HCT VFR BLD AUTO: 31.2 % (ref 36–46)
HGB BLD-MCNC: 10.2 G/DL (ref 12–16)
IMM GRANULOCYTES # BLD AUTO: 0.07 X10*3/UL (ref 0–0.7)
IMM GRANULOCYTES NFR BLD AUTO: 1.3 % (ref 0–0.9)
LYMPHOCYTES # BLD AUTO: 1.22 X10*3/UL (ref 1.2–4.8)
LYMPHOCYTES NFR BLD AUTO: 21.9 %
MCH RBC QN AUTO: 30.2 PG (ref 26–34)
MCHC RBC AUTO-ENTMCNC: 32.7 G/DL (ref 32–36)
MCV RBC AUTO: 92 FL (ref 80–100)
MONOCYTES # BLD AUTO: 0.48 X10*3/UL (ref 0.1–1)
MONOCYTES NFR BLD AUTO: 8.6 %
NEUTROPHILS # BLD AUTO: 3.8 X10*3/UL (ref 1.2–7.7)
NEUTROPHILS NFR BLD AUTO: 68 %
NRBC BLD-RTO: ABNORMAL /100{WBCS}
PLATELET # BLD AUTO: 305 X10*3/UL (ref 150–450)
POTASSIUM SERPL-SCNC: 3.7 MMOL/L (ref 3.5–5.3)
PROT SERPL-MCNC: 6.8 G/DL (ref 6.4–8.2)
RBC # BLD AUTO: 3.38 X10*6/UL (ref 4–5.2)
SODIUM SERPL-SCNC: 137 MMOL/L (ref 136–145)
WBC # BLD AUTO: 5.6 X10*3/UL (ref 4.4–11.3)

## 2024-01-02 PROCEDURE — 96417 CHEMO IV INFUS EACH ADDL SEQ: CPT

## 2024-01-02 PROCEDURE — 85025 COMPLETE CBC W/AUTO DIFF WBC: CPT

## 2024-01-02 PROCEDURE — 96413 CHEMO IV INFUSION 1 HR: CPT

## 2024-01-02 PROCEDURE — 2500000004 HC RX 250 GENERAL PHARMACY W/ HCPCS (ALT 636 FOR OP/ED): Performed by: STUDENT IN AN ORGANIZED HEALTH CARE EDUCATION/TRAINING PROGRAM

## 2024-01-02 PROCEDURE — 2500000004 HC RX 250 GENERAL PHARMACY W/ HCPCS (ALT 636 FOR OP/ED): Mod: JZ | Performed by: STUDENT IN AN ORGANIZED HEALTH CARE EDUCATION/TRAINING PROGRAM

## 2024-01-02 PROCEDURE — 96375 TX/PRO/DX INJ NEW DRUG ADDON: CPT | Mod: INF

## 2024-01-02 PROCEDURE — 84075 ASSAY ALKALINE PHOSPHATASE: CPT

## 2024-01-02 RX ORDER — HEPARIN 100 UNIT/ML
500 SYRINGE INTRAVENOUS AS NEEDED
Status: CANCELLED | OUTPATIENT
Start: 2024-01-02

## 2024-01-02 RX ORDER — PROCHLORPERAZINE EDISYLATE 5 MG/ML
10 INJECTION INTRAMUSCULAR; INTRAVENOUS EVERY 6 HOURS PRN
Status: DISCONTINUED | OUTPATIENT
Start: 2024-01-02 | End: 2024-01-02 | Stop reason: HOSPADM

## 2024-01-02 RX ORDER — FAMOTIDINE 10 MG/ML
20 INJECTION INTRAVENOUS ONCE
Status: COMPLETED | OUTPATIENT
Start: 2024-01-02 | End: 2024-01-02

## 2024-01-02 RX ORDER — ALBUTEROL SULFATE 0.83 MG/ML
3 SOLUTION RESPIRATORY (INHALATION) AS NEEDED
Status: DISCONTINUED | OUTPATIENT
Start: 2024-01-02 | End: 2024-01-02 | Stop reason: HOSPADM

## 2024-01-02 RX ORDER — HEPARIN SODIUM,PORCINE/PF 10 UNIT/ML
50 SYRINGE (ML) INTRAVENOUS AS NEEDED
Status: CANCELLED | OUTPATIENT
Start: 2024-01-02

## 2024-01-02 RX ORDER — EPINEPHRINE 0.3 MG/.3ML
0.3 INJECTION SUBCUTANEOUS EVERY 5 MIN PRN
Status: DISCONTINUED | OUTPATIENT
Start: 2024-01-02 | End: 2024-01-02 | Stop reason: HOSPADM

## 2024-01-02 RX ORDER — DIPHENHYDRAMINE HYDROCHLORIDE 50 MG/ML
50 INJECTION INTRAMUSCULAR; INTRAVENOUS AS NEEDED
Status: DISCONTINUED | OUTPATIENT
Start: 2024-01-02 | End: 2024-01-02 | Stop reason: HOSPADM

## 2024-01-02 RX ORDER — DIPHENHYDRAMINE HYDROCHLORIDE 50 MG/ML
50 INJECTION INTRAMUSCULAR; INTRAVENOUS ONCE
Status: COMPLETED | OUTPATIENT
Start: 2024-01-02 | End: 2024-01-02

## 2024-01-02 RX ORDER — DEXAMETHASONE SODIUM PHOSPHATE 100 MG/10ML
10 INJECTION INTRAMUSCULAR; INTRAVENOUS ONCE
Status: COMPLETED | OUTPATIENT
Start: 2024-01-02 | End: 2024-01-02

## 2024-01-02 RX ORDER — PROCHLORPERAZINE MALEATE 10 MG
10 TABLET ORAL EVERY 6 HOURS PRN
Status: DISCONTINUED | OUTPATIENT
Start: 2024-01-02 | End: 2024-01-02 | Stop reason: HOSPADM

## 2024-01-02 RX ORDER — HEPARIN 100 UNIT/ML
500 SYRINGE INTRAVENOUS AS NEEDED
Status: DISCONTINUED | OUTPATIENT
Start: 2024-01-02 | End: 2024-01-02 | Stop reason: HOSPADM

## 2024-01-02 RX ORDER — FAMOTIDINE 10 MG/ML
20 INJECTION INTRAVENOUS ONCE AS NEEDED
Status: DISCONTINUED | OUTPATIENT
Start: 2024-01-02 | End: 2024-01-02 | Stop reason: HOSPADM

## 2024-01-02 RX ADMIN — TRASTUZUMAB-ANNS 333 MG: 420 INJECTION, POWDER, LYOPHILIZED, FOR SOLUTION INTRAVENOUS at 12:22

## 2024-01-02 RX ADMIN — DIPHENHYDRAMINE HYDROCHLORIDE 50 MG: 50 INJECTION INTRAMUSCULAR; INTRAVENOUS at 11:55

## 2024-01-02 RX ADMIN — HEPARIN 500 UNITS: 100 SYRINGE at 14:40

## 2024-01-02 RX ADMIN — FAMOTIDINE 20 MG: 10 INJECTION INTRAVENOUS at 11:46

## 2024-01-02 RX ADMIN — DEXAMETHASONE SODIUM PHOSPHATE 10 MG: 10 INJECTION INTRAMUSCULAR; INTRAVENOUS at 11:47

## 2024-01-02 RX ADMIN — PACLITAXEL 90 MG: 6 INJECTION, SOLUTION, CONCENTRATE INTRAVENOUS at 13:09

## 2024-01-02 ASSESSMENT — PAIN SCALES - GENERAL: PAINLEVEL: 0-NO PAIN

## 2024-01-02 NOTE — SIGNIFICANT EVENT
01/02/24 1120   Prechemo Checklist   Has the patient been in the hospital, ED, or urgent care since last date of service No   Chemo/Immuno Consent Signed Yes   Protocol/Indications Verified Yes   Confirmed to previous date/time of medication Yes   Compared to previous dose Yes   All medications are dated accurately Yes   Pregnancy Test Negative Not applicable   Parameters Met Yes   BSA/Weight-Height Verified Yes   Dose Calculations Verified Yes

## 2024-01-04 NOTE — PROGRESS NOTES
Radiation Oncology Outpatient Consult    Patient Name:  Zina Hernandez  MRN:  [id number]  :  1955    Referring Provider: Dr. Cony Amin  Primary Care Provider: No Assigned PCP Generic Provider, MD  Care Team: Patient Care Team:  No Assigned Pcp Generic Provider, MD as PCP - General (Family Medicine)  Cony Amin MD as Consulting Physician (Hematology and Oncology)    Date of Service: 2024     SUBJECTIVE  History of Present Illness:  Zina Hernandez is a 68 y.o. female who was referred for a consultation to the [facility] Department of Radiation Oncology.  She is presenting for follow up, evaluation and management of breast cancer.     To briefly review her oncologic history:  She is a 67 yo postmenopausal female who had an abnormal screening mammogram on 23 that showed 2 left breast masses.     Diagnostic imaging 2023 revealed a mass at 1:00 4 cm FN measuring 5mm. Axilla was scanned and was negative.     On 2023 she underwent a left U/S guided biopsy showing ILC g2 95% MI 20% HER2+ (3+).     On 2023 she underwent left lumpectomy and SLNB (0) 2 foci of cancer largest 1.3cm, 2nd 0.7cm, margins negative [id number]. Grade 2, ER 95% MI 20% HER2+     Based on APT trial it was recommended that she proceed with THx12 weeks followed by radiation followed by hormonal therapy and herceptin to complete for a total of 1 year She received adjuvant TH chemotherapy for 12 cycles and tolerated treatment well.  She presents now at the completion of chemotherapy to proceed with adjuvant radiation therapy.     Past Gynecologic History: AFLB: 27, Menopause: 50, HRT: no    Family history: Maternal Grandmother: Uterine cancer, 60s    Prior Radiotherapy:  No  Current Systemic Treatment:  Yes, describe: Anti-HER2 therapy      Presence of Pacemaker or ICD:  No    Past Medical History:    Past Medical History:   Diagnosis Date    Cancer (CMS/HCC)     Migraine         Past  Surgical History:    Past Surgical History:   Procedure Laterality Date    BREAST LUMPECTOMY          Family History:  Cancer-related family history includes Cancer in an other family member.    Social History:    Social History     Tobacco Use    Smoking status: Never    Smokeless tobacco: Never   Substance Use Topics    Alcohol use: Never    Drug use: Never       Allergies:    Allergies   Allergen Reactions    Paclitaxel Other     Back pain and chest pain.     Tamiflu [Oseltamivir] Unknown        Medications:    Current Outpatient Medications:     dexAMETHasone 0.5 mg/5 mL oral liquid, Take 80 mL (8 mg) by mouth 2 times a day for 1 day., Disp: 160 mL, Rfl: 0    dexAMETHasone 0.5 mg/5 mL oral liquid, Take 40 mL (4 mg) by mouth 2 times a day for 20 days., Disp: 1600 mL, Rfl: 0    loperamide (Imodium A-D) 1 mg/7.5 mL liquid, Take 15 mL (2 mg) by mouth., Disp: , Rfl:       Review of Systems:  Review of Systems - Oncology  The patient's current pain level was assessed.  They report currently having a pain of 0 out of 10.  They feel their pain is under control without the use of pain medications.  Review of Systems:    Constitutional symptoms: Denies generalized fatigue. Denies weight change, fevers/chills, difficulty sleeping  Eyes: Denies double vision, glaucoma, cataracts.  Ear/nose/throat/mouth: Denies hearing changes, sore throat, sinus problems.  Cardiovascular: No chest pain. Denies irregular heartbeat. Denies ankle swelling.  Respiratory: No wheezing, cough, or shortness of breath.  Gastrointestinal: No abdominal pain, No nausea/vomiting. No indigestion/heartburn. No change in bowel habits. No constipation or diarrhea.  Genitourinary: No urinary incontinence. No urinary frequency. No painful urination.  Musculoskeletal: No bone pain, no muscle pain, no joint pain.  Integumentary: No rash. No masses. No changes in moles. No easy bruising.  Neurological: No headaches. No tremors. No  numbness/tingling.  Psychiatric: No anxiety. No depression.  Endocrine: No excessive thirst. Not too hot or too cold. Not tired or fatigued.  Hematological/lymphatic: No swollen glands or blood clotting problems. No bruising.  14 point review of systems reviewed at length with the patient and is positive as per noted above.    Performance Status:  The Karnofsky performance scale today is 90, Able to carry on normal activity; minor signs or symptoms of disease (ECOG equivalent 0).   OBJECTIVE  Physical Exam:  General: no acute distress, engaged in conversation.   HEENT: Normocephalic, atraumatic. Extraocular movements are intact.   Neck: supple with trachea at midline, no palpable adenopathy.   Pulmonary: Breathing comfortably on room air, no respiratory distress  Cardiovascular: Regular rate, no cyanosis, well-perfused  Abdomen: Soft, nontender, nondistended.   Extremities: No lower extremity edema or cyanosis. Normal range of motion.  Skin: Without rash or obvious lesions.   Musculoskeletal: Normal range of motion. Able to raise both arms above head without issues  Neurologic: Alert and oriented x3. Cranial nerves grossly intact.   Breast: Well-healed incision in the left breast without evidence of seroma, mass, or infection.    Laboratory Review:  There are no laboratory contraindications to radiation therapy.  The pertinent lab results were reviewed and discussed with the patient.      Pathology Review:  The pertinent pathology results were reviewed and discussed with the patient.  Notably:  CASE SUMMARY REPORT       SPECIMEN  Procedure:       Excision (less than total mastectomy)  Specimen Laterality:     Left  TUMOR  Tumor Site:     Clock position       1 o'clock       Distance from nipple (Centimeters): 4 cm  Histologic Type:     Invasive lobular carcinoma  Histologic Grade (Lexi Histologic Score):   Glandular (Acinar) / Tubular Differentiation:       Score 3   Nuclear Pleomorphism:       Score 2    Mitotic Rate:       Score 2   Overall Grade:       Grade 2 (scores of 6 or 7)  Tumor Size:     Greatest dimension of largest invasive focus (Millimeters): 13 mm, based  on microscopic examination and three dimensional reconstruction  Tumor Focality:     Multiple foci of invasive carcinoma   Number of Foci:        2   Sizes of Individual Foci in Millimeters (mm): 13 mm; 7 mm  Ductal Carcinoma In Situ (DCIS):     Not identified  Lobular Carcinoma In Situ (LCIS):     Present, with focal necrosis  Lymphatic and / or Vascular Invasion:     Not identified  Dermal Lymphovascular Invasion:     No skin present  Microcalcifications:     Not identified  Treatment Effect in the Breast:     No known presurgical therapy    MARGINS  Margin Status for Invasive Carcinoma:     All margins negative for invasive carcinoma   Distance from Invasive Carcinoma to Final Closest Margin:       Greater than: 2 mm    REGIONAL LYMPH NODES  Regional Lymph Node Status:       All regional lymph nodes negative for tumor   Total Number of Lymph Nodes Examined (sentinel and non-sentinel):      1   Number of [place] Nodes Examined:        1    pTNM CLASSIFICATION (AJCC 8th Edition)  pT Category:     pT1c  T Suffix:     (m)  pN Category:     pN0  N Suffix:     (sn)  Comment: The morphology of 2 foci is similar with invasive lobular phenotype.  Breast Biomarker Testing Performed on Previous Biopsy:     Estrogen Receptor (ER) Status:         Positive (greater than 10% of cells demonstrate nuclear positivity)     Percentage of Cells with Nuclear Positivity:            > 95 %, See addendum for repeat receptors   Progesterone Receptor (PgR) Status:         Positive, See addendum for repeat receptors     Percentage of Cells with Nuclear Positivity:            20 %, See addendum for repeat receptors   HER2 (by immunohistochemistry):         Positive (Score 3+), See addendum for repeat receptors      Imaging:  The pertinent imaging results were reviewed and  discussed with the patient.       ASSESSMENT:  Zina Hernandez is a 68 y.o. female with [id number] grade 2 multifocal invasive lobular carcinoma with 2 foci measuring 1.3 and 0.7 cm with associated ALH and LCIS status post resection with negative surgical margins and 0 out of 1 sentinel lymph nodes involved.  ER 95% positive CA 10% positive, HER2 3+ positive who has completed adjuvant Taxol and Herceptin for 12 cycles and is here to proceed with adjuvant radiation therapy.    The patient is doing well at the completion of chemotherapy after having undergone lumpectomy and SLNB, with well-healing incision, no significant seroma or lumpectomy retraction, and no other suspicious findings in the breast.     We discussed with Ms. Hernandez that lumpectomy followed by whole breast radiation is the standard of care for breast conservation candidates. Post-lumpectomy radiation reduces the risk of locoregional recurrences by 2/3, reduces the risk of overall recurrence by 1/2, and afford a modest survival benefit according to the EBCTCG meta-analysis. Even among ER+ patients treated with tamoxifen, WBRT still decreases the risk of ipsilateral breast tumor (from ~ 16.5% with tamoxifen alone to 9% with radiation alone to 2.8% with tamoxifen + radiation according to NSABP B-21).     In some patients, the absolute risk of a local recurrence (within their lifetime) is low enough to warrant omission of radiation. We do NOT recommend this approach for her given her excellent performance status, good health, and long life expectancy, age, and multifocal disease. We explained that she is an excellent candidate for hypofractionated radiation, which decreases acute toxicity and provides equivalent oncologic and functional/cosmetic outcomes compared to conventionally fractionated radiation (long-term outcome data from [place] and [place] trials, recent toxicity data from [facility] and [facility]). These benefits apply to patients regardless of tumor  grade, hormone receptor status, HER2 receptor status, margin status, tumor side, or breast size (as long as dose-homogeneity goals are achieved). Women of all ages are eligible for hypofractionated radiation     We discussed the rationale and data supporting the use of radiation boost to the lumpectomy cavity for women with breast cancer managed surgically with breast conserving surgery. The best data in this setting comes from the EORTC boost trial (Ilsa et al., Lancet Oncol. 2015 Jan;16(1):47-56.). We discussed that although boost has been shown to further improve local control, it did not lead to an improvement in overall survival. Furthermore, we discussed that boost benefit, in absolute terms, was most significant in women under the age of 50 years old, and that the boost data suggest a 1-2% absolute improvement in local control for women over the age of 50 with boost to 16 Gy (which is higher than the typical boost doses used in the [place] of 10 Gy in 5 fractions). This benefit also came at the expense of a greater than 1-2% increase of severe late fibrosis in women who received the boost. We therefore did not recommend boost to the lumpectomy cavity given the small size and multifocal nature of the disease, intermediate grade disease, age greater than 50, and widely negative margins.    Finally, we discussed the side effects and risk of WBRT, including the need for CT simulation, tattoo placement, and daily radiation (M-F) x 20 days. We discussed the side effects and risks of radiation, including fatigue, radiation dermatitis, and breast pain in the short-term; skin fibrosis, pigmentation, chest wall pain, damage to underlying lung, rib, or heart, and secondary malignancies in the long-term. All questions were answered to the best of our ability and informed consent was obtained.     Plan:  - Simulation delayed today because she is not feeling well after her chemo.  - Radiation to start soon  thereafter. Plan for 40.05 Gy in 15 fractions WBRT with ABC given left sided disease    Kervin Vuong MD, PhD  Attending Physician

## 2024-01-05 ENCOUNTER — HOSPITAL ENCOUNTER (OUTPATIENT)
Dept: RADIATION ONCOLOGY | Facility: CLINIC | Age: 69
Setting detail: RADIATION/ONCOLOGY SERIES
Discharge: HOME | End: 2024-01-05
Payer: MEDICARE

## 2024-01-05 ENCOUNTER — HOSPITAL ENCOUNTER (OUTPATIENT)
Dept: RADIOLOGY | Facility: EXTERNAL LOCATION | Age: 69
Discharge: HOME | End: 2024-01-05

## 2024-01-05 ENCOUNTER — APPOINTMENT (OUTPATIENT)
Dept: RADIATION ONCOLOGY | Facility: CLINIC | Age: 69
End: 2024-01-05
Payer: MEDICARE

## 2024-01-05 VITALS
TEMPERATURE: 98.2 F | HEART RATE: 94 BPM | RESPIRATION RATE: 20 BRPM | WEIGHT: 124.4 LBS | OXYGEN SATURATION: 95 % | SYSTOLIC BLOOD PRESSURE: 138 MMHG | BODY MASS INDEX: 24.72 KG/M2 | DIASTOLIC BLOOD PRESSURE: 77 MMHG

## 2024-01-05 DIAGNOSIS — C50.412 MALIGNANT NEOPLASM OF UPPER-OUTER QUADRANT OF LEFT BREAST IN FEMALE, ESTROGEN RECEPTOR POSITIVE (MULTI): Primary | ICD-10-CM

## 2024-01-05 DIAGNOSIS — Z17.0 MALIGNANT NEOPLASM OF UPPER-OUTER QUADRANT OF LEFT BREAST IN FEMALE, ESTROGEN RECEPTOR POSITIVE (MULTI): Primary | ICD-10-CM

## 2024-01-05 DIAGNOSIS — C50.412 MALIGNANT NEOPLASM OF UPPER-OUTER QUADRANT OF LEFT FEMALE BREAST, UNSPECIFIED ESTROGEN RECEPTOR STATUS (MULTI): ICD-10-CM

## 2024-01-05 DIAGNOSIS — C50.912 INVASIVE LOBULAR CARCINOMA OF LEFT BREAST IN FEMALE (MULTI): ICD-10-CM

## 2024-01-05 PROCEDURE — 99205 OFFICE O/P NEW HI 60 MIN: CPT | Performed by: RADIOLOGY

## 2024-01-05 PROCEDURE — 99215 OFFICE O/P EST HI 40 MIN: CPT | Performed by: RADIOLOGY

## 2024-01-05 ASSESSMENT — PAIN SCALES - GENERAL: PAINLEVEL: 0-NO PAIN

## 2024-01-09 ENCOUNTER — APPOINTMENT (OUTPATIENT)
Dept: RADIATION ONCOLOGY | Facility: CLINIC | Age: 69
End: 2024-01-09
Payer: MEDICARE

## 2024-01-09 ENCOUNTER — TELEPHONE (OUTPATIENT)
Dept: SURGICAL ONCOLOGY | Facility: HOSPITAL | Age: 69
End: 2024-01-09
Payer: MEDICARE

## 2024-01-09 ENCOUNTER — TELEPHONE (OUTPATIENT)
Dept: OBSTETRICS AND GYNECOLOGY | Facility: CLINIC | Age: 69
End: 2024-01-09
Payer: MEDICARE

## 2024-01-09 NOTE — TELEPHONE ENCOUNTER
Pt has a history of cancer. Daughters want to get tested to see if its hereditary .  Pt daughters told her she needs to get tested first for genetics before her daughters.  Is this correct ? Please advise.

## 2024-01-09 NOTE — TELEPHONE ENCOUNTER
Spoke with pt and advised that she call her breast surgeon to get a referral to genetics. Pt verbalzied understanding, no further questions.

## 2024-01-15 ENCOUNTER — CONSULT (OUTPATIENT)
Dept: CARDIOLOGY | Facility: CLINIC | Age: 69
End: 2024-01-15
Payer: MEDICARE

## 2024-01-15 ENCOUNTER — LAB (OUTPATIENT)
Dept: LAB | Facility: CLINIC | Age: 69
End: 2024-01-15
Payer: MEDICARE

## 2024-01-15 VITALS
DIASTOLIC BLOOD PRESSURE: 80 MMHG | RESPIRATION RATE: 14 BRPM | HEART RATE: 87 BPM | TEMPERATURE: 97 F | BODY MASS INDEX: 25.78 KG/M2 | HEIGHT: 59 IN | SYSTOLIC BLOOD PRESSURE: 143 MMHG | OXYGEN SATURATION: 95 % | WEIGHT: 127.87 LBS

## 2024-01-15 DIAGNOSIS — Z51.81 ENCOUNTER FOR MONITORING CARDIOTOXIC DRUG THERAPY: Primary | ICD-10-CM

## 2024-01-15 DIAGNOSIS — Z78.9 NEVER SMOKED ANY SUBSTANCE: ICD-10-CM

## 2024-01-15 DIAGNOSIS — R07.89 OTHER CHEST PAIN: ICD-10-CM

## 2024-01-15 DIAGNOSIS — C50.912 INVASIVE LOBULAR CARCINOMA OF LEFT BREAST IN FEMALE (MULTI): ICD-10-CM

## 2024-01-15 DIAGNOSIS — R07.9 CHEST PAIN, UNSPECIFIED TYPE: ICD-10-CM

## 2024-01-15 DIAGNOSIS — R06.00 DYSPNEA, UNSPECIFIED TYPE: ICD-10-CM

## 2024-01-15 DIAGNOSIS — R05.9 COUGH IN ADULT: ICD-10-CM

## 2024-01-15 DIAGNOSIS — Z79.899 ENCOUNTER FOR MONITORING CARDIOTOXIC DRUG THERAPY: Primary | ICD-10-CM

## 2024-01-15 DIAGNOSIS — Z17.0 MALIGNANT NEOPLASM OF UPPER-OUTER QUADRANT OF LEFT BREAST IN FEMALE, ESTROGEN RECEPTOR POSITIVE (MULTI): ICD-10-CM

## 2024-01-15 DIAGNOSIS — C50.412 MALIGNANT NEOPLASM OF UPPER-OUTER QUADRANT OF LEFT BREAST IN FEMALE, ESTROGEN RECEPTOR POSITIVE (MULTI): ICD-10-CM

## 2024-01-15 LAB
BNP SERPL-MCNC: 9 PG/ML (ref 0–99)
CARDIAC TROPONIN I PNL SERPL HS: <3 NG/L (ref 0–13)

## 2024-01-15 PROCEDURE — 93005 ELECTROCARDIOGRAM TRACING: CPT | Performed by: INTERNAL MEDICINE

## 2024-01-15 PROCEDURE — 99204 OFFICE O/P NEW MOD 45 MIN: CPT | Performed by: INTERNAL MEDICINE

## 2024-01-15 PROCEDURE — 83880 ASSAY OF NATRIURETIC PEPTIDE: CPT

## 2024-01-15 PROCEDURE — 93010 ELECTROCARDIOGRAM REPORT: CPT | Performed by: INTERNAL MEDICINE

## 2024-01-15 PROCEDURE — 84484 ASSAY OF TROPONIN QUANT: CPT

## 2024-01-15 PROCEDURE — 99214 OFFICE O/P EST MOD 30 MIN: CPT | Performed by: INTERNAL MEDICINE

## 2024-01-15 ASSESSMENT — PATIENT HEALTH QUESTIONNAIRE - PHQ9
1. LITTLE INTEREST OR PLEASURE IN DOING THINGS: NOT AT ALL
2. FEELING DOWN, DEPRESSED OR HOPELESS: NOT AT ALL
SUM OF ALL RESPONSES TO PHQ9 QUESTIONS 1 AND 2: 0

## 2024-01-15 ASSESSMENT — PAIN SCALES - GENERAL: PAINLEVEL: 0-NO PAIN

## 2024-01-15 ASSESSMENT — ENCOUNTER SYMPTOMS
LOSS OF SENSATION IN FEET: 0
DEPRESSION: 0
OCCASIONAL FEELINGS OF UNSTEADINESS: 0

## 2024-01-15 ASSESSMENT — COLUMBIA-SUICIDE SEVERITY RATING SCALE - C-SSRS
6. HAVE YOU EVER DONE ANYTHING, STARTED TO DO ANYTHING, OR PREPARED TO DO ANYTHING TO END YOUR LIFE?: NO
1. IN THE PAST MONTH, HAVE YOU WISHED YOU WERE DEAD OR WISHED YOU COULD GO TO SLEEP AND NOT WAKE UP?: NO
2. HAVE YOU ACTUALLY HAD ANY THOUGHTS OF KILLING YOURSELF?: NO

## 2024-01-15 NOTE — PROGRESS NOTES
CARDIOLOGY NEW PATIENT OFFICE VISIT    Patient:  Zina Hernandez  YOB: 1955  MRN: [id number]       Chief Complaint/Active Symptoms:       Zina Hernandez is a 68 y.o. female who is being seen today at the request of Dr. Tonny Salgado for evaluation of Breast cancer treatment.    Chief Complaint   Patient presents with   • New Patient Visit       History of Present Illness:   HPI  Patient is being seen in Cardio-Oncology clinic due to pending treatment for left breast cancer;  ER and HER2+.     Cancer Diagnosis: Left breast cancer, ER=, PR20%+, HER2+  Oncologist: Dr. Cony Amin  Treatment:  Paclitaxel and Herceptin, lumpectomy in August 2023.  To continue with Herceptin through October after radiation therapy.  Risk factors: Age    Testing:   EKG today - NSR, normal EKG  Echo 10/2023 - EF 65-70%, normal strain  CT chest 3/2022 - no coronary calcifications noted.   LDL cholesterol 4/2023 was 67    Here for CV evaluation. Had CP starting with her 6th treatment of Paclitaxel but with slowing the dose this did not recur. She has now completed her Herceptin + Paclitaxel and is getting ready to start radiation treatments before restarting her Herceptin and through October of this year.     The patients chest pain started in her back and then moved to the front and described as a chest tightness. The first episode they stopped treatment and gave her something and it resolved in about 5 mintues. After dose adjustment and delivery adjustment it did not recur.     Has never had chest discomfort outside of this episode. Has a chronic tickle cough since starting chemotherapy. Never been productive, no hemoptysis. No clear or separate shortness of breath. Has occasional ankle edema that resolved with elevation. Has had neuropathy in her finger tips and toes. Has not progressed. No other prior cardiac work-up other than echo pre-chemo. No palpitations, syncope or presyncope.     Familly history.   Mother - Type 2 DM,  CHF, CAD/MI - in her 70's  Father - stroke, Type 2 DM, HTN    Allergies:     Allergies   Allergen Reactions   • Paclitaxel Other     Back pain and chest pain.    • Tamiflu [Oseltamivir] Unknown        Outpatient Medications:     Current Outpatient Medications   Medication Instructions   • dexAMETHasone 8 mg, oral, 2 times daily   • dexAMETHasone 4 mg, oral, 2 times daily   • loperamide (IMODIUM A-D) 2 mg, oral        Past Medical History:     Past Medical History:   Diagnosis Date   • Cancer (CMS/HCC)    • Migraine        Social History:     Social History     Socioeconomic History   • Marital status:      Spouse name: Not on file   • Number of children: Not on file   • Years of education: Not on file   • Highest education level: Not on file   Occupational History   • Not on file   Tobacco Use   • Smoking status: Never   • Smokeless tobacco: Never   Substance and Sexual Activity   • Alcohol use: Never   • Drug use: Never   • Sexual activity: Not on file   Other Topics Concern   • Not on file   Social History Narrative   • Not on file     Social Determinants of Health     Financial Resource Strain: Not on file   Food Insecurity: Not on file   Transportation Needs: Not on file   Physical Activity: Not on file   Stress: Not on file   Social Connections: Not on file   Intimate Partner Violence: Not on file   Housing Stability: Not on file       Family History:        Family History   Problem Relation Name Age of Onset   • Hypertension Mother     • Diabetes Mother     • Other (cardiac disorder) Mother     • Hypertension Father     • Diabetes Father     • Stroke Father     • Cancer Other Grandparent        Review of Systems:     Review of Systems    Objective:     Vitals:    01/15/24 1421   BP: 143/80   Pulse: 87   Resp: 14   Temp: 36.1 °C (97 °F)   SpO2: 95%       Vitals:    01/15/24 1421   Weight: 58 kg (127 lb 13.9 oz)       Physical Examination:   GENERAL:  Well developed, well nourished, in no acute  distress.  HEENT: NC AT EOMI with anicteric sclera  NECK:  Supple, no JVD, no bruit.  CHEST:  Symmetric and nontender.  Right chest port  LUNGS:  Normal respiratory effort. Bilateral breath sounds clear to auscultation.   HEART:  PMI nondisplaced. Rate and rhythm regular with no evident murmur, no gallop appreciated. There are no rubs, clicks or heaves.  PERIPHERAL VASCULAR:  Pulses present and equally palpable; 2+ throughout.  ABDOMEN: Soft, NT, ND without HSM or palpable organomegaly, no bruits, normoactive bowel sounds  MUSCULOSKELETAL: No significant joint or limb deformity  EXTREMITIES:  Warm with good color, no clubbing or cyanosis.  There is no edema noted.  NEURO/PSYCH:  Alert and oriented times three with approppriate behavior and responses.  SKIN: No rashes on exposed skin, no reported skin lesions.     Lab:     CBC:   Lab Results   Component Value Date    WBC 5.6 01/02/2024    RBC 3.38 (L) 01/02/2024    HGB 10.2 (L) 01/02/2024    HCT 31.2 (L) 01/02/2024     01/02/2024      Lab Results   Component Value Date    WBC 5.6 01/02/2024    WBC 5.0 12/26/2023    WBC 5.8 12/19/2023    RBC 3.38 (L) 01/02/2024    RBC 3.32 (L) 12/26/2023    RBC 3.56 (L) 12/19/2023    HGB 10.2 (L) 01/02/2024    HGB 9.9 (L) 12/26/2023    HGB 10.5 (L) 12/19/2023    HCT 31.2 (L) 01/02/2024    HCT 30.4 (L) 12/26/2023    HCT 32.1 (L) 12/19/2023    MCV 92 01/02/2024    MCV 92 12/26/2023    MCV 90 12/19/2023    MCH 30.2 01/02/2024    MCH 29.8 12/26/2023    MCH 29.5 12/19/2023    MCHC 32.7 01/02/2024    MCHC 32.6 12/26/2023    MCHC 32.7 12/19/2023    RDW 17.0 (H) 01/02/2024    RDW 17.2 (H) 12/26/2023    RDW 16.9 (H) 12/19/2023     01/02/2024     12/26/2023     12/19/2023    MPV 10.1 10/30/2023    MPV 9.8 10/24/2023    MPV 9.4 10/11/2023       CMP:    Lab Results   Component Value Date     01/02/2024    K 3.7 01/02/2024     01/02/2024    CO2 26 01/02/2024    BUN 23 01/02/2024    CREATININE 0.84 01/02/2024  "   GLUCOSE 141 (H) 01/02/2024    CALCIUM 9.4 01/02/2024     Lab Results   Component Value Date     01/02/2024     12/26/2023     12/19/2023    K 3.7 01/02/2024    K 3.8 12/26/2023    K 3.8 12/19/2023     01/02/2024     12/26/2023     12/19/2023    CO2 26 01/02/2024    CO2 27 12/26/2023    CO2 27 12/19/2023    BUN 23 01/02/2024    BUN 20 12/26/2023    BUN 18 12/19/2023    CREATININE 0.84 01/02/2024    CREATININE 0.84 12/26/2023    CREATININE 0.80 12/19/2023     Lab Results   Component Value Date    ALKPHOS 74 01/02/2024    ALT 44 01/02/2024    AST 30 01/02/2024    PROT 6.8 01/02/2024    BILITOT 0.3 01/02/2024       Magnesium:    Lab Results   Component Value Date    MG 2.00 03/16/2022       Lipid Profile:    Lab Results   Component Value Date    TRIG 135 04/25/2023    HDL 67.6 04/25/2023       TSH:    Lab Results   Component Value Date    TSH 4.11 (H) 04/25/2023       BNP:   Lab Results   Component Value Date    BNP 15 03/16/2022        PT/INR:    Lab Results   Component Value Date    PROTIME 11.7 10/11/2023    INR 1.0 10/11/2023       HgBA1c:    No results found for: \"HGBA1C\"    Cardiac Enzymes:    No results found for: \"TROPHS\"      Diagnostic Studies:     No echocardiogram results found for the past 12 months  Echocardiogram October 2023 showed normal LV function and normal myocardial strain at -20.  No significant valvular abnormalities.  No nuclear medicine results found for the past 12 months  No prior stress test performed.  No valid procedures specified.  No prior heart catheterizations performed  Radiology:     No valid procedures specified.  CT scan of the chest in 2021 without coronary calcifications or cardiomegaly/pericardial effusion.  Assessment:     Problem List Items Addressed This Visit       Invasive lobular carcinoma of left breast in female (CMS/HCC)    Relevant Orders    ECG 12 Lead (Completed)    Malignant neoplasm of upper-outer quadrant of left breast in " female, estrogen receptor positive (CMS/[facility])    Encounter for monitoring cardiotoxic drug therapy - Primary    Relevant Orders    ECG 12 Lead (Completed)    Follow Up In Cardiology    Other chest pain    Cough in adult    Never smoked any substance     Other Visit Diagnoses       Chest pain, unspecified type        Relevant Orders    B-Type Natriuretic Peptide    Troponin I, High Sensitivity    Follow Up In Cardiology    Dyspnea, unspecified type        Relevant Orders    B-Type Natriuretic Peptide            Plan:     1.  Left breast cancer with cardiotoxic drug treatment.  Patient has done well except for 1 episode during which she had chest discomfort with infusion of her paclitaxel.  There were minor adjustments made to her medical regimen but outside of that 1 episode it has never recurred.  She has no prior history of coronary artery disease and while she has this chronic cough there is no clear shortness of breath associated with it.  She has no orthopnea or PND and although she is fairly sedentary she has no problems with her ADLs except as related to generalized malaise and weakness that has been associated with different days in her chemotherapy cycle.    I have recommended a high-sensitivity troponin and BN peptide.  Given her normal EKG and normal examination today if these are negative I would hold off on any other additional testing and see her in follow-up in 3 months.  I would suggest with her next cycle of Herceptin she received a BN peptide and troponin following that as well.  If she has any further symptoms or if these tests are abnormal would recommend an echocardiogram.  I have asked the patient to pay attention if she has any symptoms at home and if she notices any change her capacity to do work around the home.    2.  Chest discomfort.  Of unclear etiology relatively brief in duration and a single isolated incident.  The patient does not have hypertension, her cholesterol is normal, and a CT  scan done 2 years ago had no coronary calcifications.  There is no family history of premature coronary artery disease nor prior documentation of any abnormalities in the patient.    3.  Cough.  Of unclear etiology at this time.  Questionably related to a pulmonary type of symptom but no clear orthopnea or PND.  Will check the BN peptide if elevated would pursue further.    4.  Tobacco weight status.  The patient is a lifelong non-smoker with a BMI of 25.    Will see her back in follow-up in 3 months we will see her sooner and perform additional cardiovascular testing if these labs are abnormal or she has repeat symptoms.

## 2024-01-16 ENCOUNTER — APPOINTMENT (OUTPATIENT)
Dept: RADIATION ONCOLOGY | Facility: CLINIC | Age: 69
End: 2024-01-16
Payer: MEDICARE

## 2024-01-16 ENCOUNTER — OFFICE VISIT (OUTPATIENT)
Dept: HEMATOLOGY/ONCOLOGY | Facility: CLINIC | Age: 69
End: 2024-01-16
Payer: MEDICARE

## 2024-01-16 VITALS
BODY MASS INDEX: 25.51 KG/M2 | HEART RATE: 90 BPM | WEIGHT: 127.21 LBS | TEMPERATURE: 97 F | RESPIRATION RATE: 18 BRPM | DIASTOLIC BLOOD PRESSURE: 79 MMHG | OXYGEN SATURATION: 97 % | SYSTOLIC BLOOD PRESSURE: 146 MMHG

## 2024-01-16 DIAGNOSIS — C50.912 INVASIVE LOBULAR CARCINOMA OF LEFT BREAST IN FEMALE (MULTI): Primary | ICD-10-CM

## 2024-01-16 LAB
ATRIAL RATE: 80 BPM
P AXIS: 62 DEGREES
P OFFSET: 185 MS
P ONSET: 146 MS
PR INTERVAL: 154 MS
Q ONSET: 223 MS
QRS COUNT: 13 BEATS
QRS DURATION: 76 MS
QT INTERVAL: 352 MS
QTC CALCULATION(BAZETT): 405 MS
QTC FREDERICIA: 387 MS
R AXIS: 4 DEGREES
T AXIS: 47 DEGREES
T OFFSET: 399 MS
VENTRICULAR RATE: 80 BPM

## 2024-01-16 PROCEDURE — 99214 OFFICE O/P EST MOD 30 MIN: CPT | Performed by: STUDENT IN AN ORGANIZED HEALTH CARE EDUCATION/TRAINING PROGRAM

## 2024-01-16 PROCEDURE — 1159F MED LIST DOCD IN RCRD: CPT | Performed by: STUDENT IN AN ORGANIZED HEALTH CARE EDUCATION/TRAINING PROGRAM

## 2024-01-16 PROCEDURE — 1126F AMNT PAIN NOTED NONE PRSNT: CPT | Performed by: STUDENT IN AN ORGANIZED HEALTH CARE EDUCATION/TRAINING PROGRAM

## 2024-01-16 PROCEDURE — 1036F TOBACCO NON-USER: CPT | Performed by: STUDENT IN AN ORGANIZED HEALTH CARE EDUCATION/TRAINING PROGRAM

## 2024-01-16 RX ORDER — ALBUTEROL SULFATE 0.83 MG/ML
3 SOLUTION RESPIRATORY (INHALATION) AS NEEDED
Status: CANCELLED | OUTPATIENT
Start: 2024-02-13

## 2024-01-16 RX ORDER — DIPHENHYDRAMINE HYDROCHLORIDE 50 MG/ML
50 INJECTION INTRAMUSCULAR; INTRAVENOUS ONCE
Status: CANCELLED
Start: 2024-02-13 | End: 2024-02-13

## 2024-01-16 RX ORDER — PROCHLORPERAZINE EDISYLATE 5 MG/ML
10 INJECTION INTRAMUSCULAR; INTRAVENOUS EVERY 6 HOURS PRN
Status: CANCELLED | OUTPATIENT
Start: 2024-02-13

## 2024-01-16 RX ORDER — EPINEPHRINE 0.3 MG/.3ML
0.3 INJECTION SUBCUTANEOUS EVERY 5 MIN PRN
Status: CANCELLED | OUTPATIENT
Start: 2024-02-13

## 2024-01-16 RX ORDER — PROCHLORPERAZINE MALEATE 5 MG
10 TABLET ORAL EVERY 6 HOURS PRN
Status: CANCELLED | OUTPATIENT
Start: 2024-02-13

## 2024-01-16 RX ORDER — DIPHENHYDRAMINE HYDROCHLORIDE 50 MG/ML
50 INJECTION INTRAMUSCULAR; INTRAVENOUS AS NEEDED
Status: CANCELLED | OUTPATIENT
Start: 2024-02-13

## 2024-01-16 RX ORDER — FAMOTIDINE 10 MG/ML
20 INJECTION INTRAVENOUS ONCE AS NEEDED
Status: CANCELLED | OUTPATIENT
Start: 2024-02-13

## 2024-01-16 ASSESSMENT — PAIN SCALES - GENERAL: PAINLEVEL: 0-NO PAIN

## 2024-01-16 NOTE — PROGRESS NOTES
Patient ID: Zina Hernandez is a 68 y.o. female.    The patient presents to clinic today for her history of breast cancer.     Cancer Staging   No matching staging information was found for the patient.      Diagnostic/Therapeutic History:    The patient presents to clinic today for her history of breast cancer.     Diagnostic/Therapeutic History:  Cancer History:          Breast         AJCC Edition: 8th (AJCC), Diagnosis Date: 30-Aug-2023, IA, pT1c pN0 cM0 G2     Treatment Synopsis:    - On 6/14/2023 screening mammogram she had 2 adjacent irregular spiculated masses in the upper outer left breast at middle depth.  No suspicious masses or calcification  in the right breast.  BI-RADS Category 0     -On 6/28/2023 she had targeted ultrasound that showed at 1:00, 4 cm from the nipple an irregular hypoechoic mass measuring 3 x 5 x 4 mm this corresponds to the mammographic finding.  Left axilla showed 4 morphologically normal lymph nodes without lymphadenopathy      -On 7/14/2023 she had left breast mass ultrasound guided core needle  invasive lobular carcinoma, grade 2 with  ER 95%, DC 20%, HER2 positive 3+, also had lobular carcinoma in situ     -On 8/30/2023 Dr. Blanchard performed left partial mastectomy with sentinel lymph node biopsy (0/1) 2 foci of cancer largest 1 measuring 13 mm, second 1 measuring 7 mm, margins were negative final stage PT1CN0     -on 01/02/2024: completed TH    History of Present Illness (HPI)/Interval History:    no fever, no chills. No chest pain, no shortness of breath  Does have fatigue. No abdominal pain, no nausea, no vomitting.  Does have intermittent numbness in fingers and toes       14 point review of system otherwise unremarkable     PMH: as above     Meds: allergies reviewed      Reproductive history: Menarche at 13, AFLB: 27,  menopause at 50, no HRT     Family history maternal grandmother with uterine cancer in her 60s    She denies any fevers or chills.      Review of  Systems:  14-point ROS otherwise negative, as per HPI.    Past Medical History:   Diagnosis Date    Cancer (CMS/HCC)     Migraine        Past Surgical History:   Procedure Laterality Date    BREAST LUMPECTOMY         Social History     Socioeconomic History    Marital status:      Spouse name: None    Number of children: None    Years of education: None    Highest education level: None   Occupational History    None   Tobacco Use    Smoking status: Never    Smokeless tobacco: Never   Substance and Sexual Activity    Alcohol use: Never    Drug use: Never    Sexual activity: None   Other Topics Concern    None   Social History Narrative    None     Social Determinants of Health     Financial Resource Strain: Not on file   Food Insecurity: Not on file   Transportation Needs: Not on file   Physical Activity: Not on file   Stress: Not on file   Social Connections: Not on file   Intimate Partner Violence: Not on file   Housing Stability: Not on file       Allergies   Allergen Reactions    Paclitaxel Other     Back pain and chest pain.     Tamiflu [Oseltamivir] Unknown         Current Outpatient Medications:     dexAMETHasone 0.5 mg/5 mL oral liquid, Take 80 mL (8 mg) by mouth 2 times a day for 1 day., Disp: 160 mL, Rfl: 0    dexAMETHasone 0.5 mg/5 mL oral liquid, Take 40 mL (4 mg) by mouth 2 times a day for 20 days., Disp: 1600 mL, Rfl: 0    loperamide (Imodium A-D) 1 mg/7.5 mL liquid, Take 15 mL (2 mg) by mouth., Disp: , Rfl:      Objective    BSA: 1.55 meters squared  /79 (BP Location: Left arm, Patient Position: Sitting)   Pulse 90   Temp 36.1 °C (97 °F) (Temporal)   Resp 18   Wt 57.7 kg (127 lb 3.3 oz)   SpO2 97%   BMI 25.51 kg/m²     ECOG Performance Status: 0    Physical Exam    Constitutional: Well developed, awake/alert/oriented  x3, no distress, alert and cooperative   Eyes: PERRL, EOMI, clear sclera   ENMT: mucous membranes moist, no apparent injury,  no lesions seen   Head/Neck: Neck supple,  no apparent injury, thyroid  without mass or tenderness, No JVD, trachea midline, no bruits   Respiratory/Thorax: Patent airways, CTAB, normal  breath sounds with good chest expansion, thorax symmetric   Cardiovascular: Regular, rate and rhythm, no murmurs,  2+ equal pulses of the extremities, normal S 1and S 2   Gastrointestinal: Nondistended, soft, non-tender,  no rebound tenderness or guarding, no masses palpable, no organomegaly, +BS, no bruits   Extremities: normal extremities, no cyanosis edema,  contusions or wounds, no clubbing   Breast: Left surgical incision healing well, +seroma,  no new nodules or lymphadenopathy.  No right breast nodules or lymphadenopathy        Laboratory Data:  Lab Results   Component Value Date    WBC 5.6 01/02/2024    HGB 10.2 (L) 01/02/2024    HCT 31.2 (L) 01/02/2024    MCV 92 01/02/2024     01/02/2024    ANC 2.31 11/21/2023       Chemistry    Lab Results   Component Value Date/Time     01/02/2024 1033    K 3.7 01/02/2024 1033     01/02/2024 1033    CO2 26 01/02/2024 1033    BUN 23 01/02/2024 1033    CREATININE 0.84 01/02/2024 1033    Lab Results   Component Value Date/Time    CALCIUM 9.4 01/02/2024 1033    ALKPHOS 74 01/02/2024 1033    AST 30 01/02/2024 1033    ALT 44 01/02/2024 1033    BILITOT 0.3 01/02/2024 1033             Radiology:  ECG 12 Lead  EKG today - NSR, normal EKG       BI mammo bilateral screening tomosynthesis 06/14/2023    Narrative  Interpreted By:  ENZO MANUEL MD  MRN: [id number]  Patient Name: HUNTER VELA    STUDY:  DIGITAL MAMM SCREENING W/ RUBI;  6/14/2023 8:00 am    ACCESSION NUMBER(S):  [id number]    ORDERING CLINICIAN:  KATIE HINOJOSA    INDICATION:  Screening.    COMPARISON:  09/08/2021, 07/17/2020, 01/17/2019    FINDINGS:  2D and tomosynthesis images were reviewed at 1 mm slice thickness.    There are areas of scattered fibroglandular tissue.  There are 2  adjacent irregular spiculated equal density masses in the upper  outer  left breast at middle depth. No suspicious masses or calcifications  are identified in the right breast.    Impression  No mammographic evidence of malignancy in the right breast. Left  breast mass x2.    BI-RADS CATEGORY:    Category: 0 - Incomplete; Need Additional Imaging Evaluation and/or  Prior Mammograms for Comparison.  Recommendation: Ultrasound Recommended.    For any future breast imaging appointments, please call [phone]).    Patient letter sent SADEVAL          Assessment/Plan:    68-year-old female previously healthy found to have on screening mammogram 2 adjacent irregular spiculated masses in the upper outer left breast at middle depth with  US showing a 5mm mass with left axilla showing 4 negative LN s/p  guided core needle  invasive lobular carcinoma, grade 2 with  ER 95%, [place] 20%, HER2 positive 3+, also had lobular carcinoma in situ. Dr. Blanchard performed left partial mastectomy with sentinel  lymph node biopsy (0/1) 2 foci of cancer largest 1 measuring 13 mm, second 1 measuring 7 mm, margins were negative final stage PT1CN0. She is presenting to discuss adjuvant treatment options.      I reviewed with her the events that led to her diagnosis of breast cancer. We reviewed all the procedures and diagnostic imaging she underwent thus far. I discussed the features of her breast cancer that include the size, grade, lymph node status and  hormone receptor/ her2-ying status.     Based on APT trial recommended to proceed with THx12 weeks followed by radiation followed by hormonal therapy and herceptin to complete for a total of 1 year     Based on the updated 10 year results of the APT trial published in the lancet in March of this year:      410 patients were enrolled and 406 were given adjuvant paclitaxel and trastuzumab and included in the analysis. Mean age at enrolment was 55 years (SD 10·5), 405 (99·8%) of 406 patients were female and one (0·2%) was male, 350 (86·2%) were  White, 28  (6·9%) were Black or , and 272 (67·0%) had hormone receptor-positive disease. After a median follow-up of 10·8 years (IQR 7·1-11·4), among 406 patients included in the analysis population, we observed 31  invasive disease-free survival events, of which six (19·4%) were locoregional ipsilateral recurrences, nine (29·0%) were new contralateral breast cancers, six (19·4%) were distant recurrences, and ten (32·3%) were all-cause deaths.  10-year invasive disease-free survival was 91·3% (95% CI 88·3-94·4), 10-year recurrence-free interval was 96·3% (95% CI 94·3-98·3), 10-year overall survival was 94·3% (95% CI 91·8-96·8), and 10-year breast  cancer-specific survival was 98·8% (95% CI 97·6-100)       -on 01/02/2024: completed TH  - Plan for radiation therapy with 40.05 Gy in 15 fractions WBRT with ABC   - Plan for herceptin maintenance for a total of 1 year (to finish in oct 2024)  - Troponin and BNP with next  herceptin infusion   - hormonal therapy to be discussed after radiation therapy    RTC on 02/13 for herceptin infusion and fup      At least 35 minutes of direct consultation was spent reviewing the patient's chart as well as discussing and  reviewing the  cancer care plan including educating and answering  questions and concerns, greater than 50 percent spent in counseling and coordination  of care.            Cony Amin MD  Hematology and Medical Oncology  [facility]

## 2024-01-23 ENCOUNTER — HOSPITAL ENCOUNTER (OUTPATIENT)
Dept: RADIOLOGY | Facility: EXTERNAL LOCATION | Age: 69
Discharge: HOME | End: 2024-01-23

## 2024-01-23 ENCOUNTER — HOSPITAL ENCOUNTER (OUTPATIENT)
Dept: RADIATION ONCOLOGY | Facility: CLINIC | Age: 69
Setting detail: RADIATION/ONCOLOGY SERIES
Discharge: HOME | End: 2024-01-23
Payer: MEDICARE

## 2024-01-23 DIAGNOSIS — C50.412 MALIGNANT NEOPLASM OF UPPER-OUTER QUADRANT OF LEFT FEMALE BREAST, UNSPECIFIED ESTROGEN RECEPTOR STATUS (MULTI): ICD-10-CM

## 2024-01-23 PROCEDURE — 77290 THER RAD SIMULAJ FIELD CPLX: CPT | Performed by: RADIOLOGY

## 2024-01-23 PROCEDURE — 77334 RADIATION TREATMENT AID(S): CPT | Performed by: RADIOLOGY

## 2024-01-23 PROCEDURE — 77263 THER RADIOLOGY TX PLNG CPLX: CPT | Performed by: RADIOLOGY

## 2024-01-24 ENCOUNTER — HOSPITAL ENCOUNTER (OUTPATIENT)
Dept: RADIATION ONCOLOGY | Facility: CLINIC | Age: 69
Setting detail: RADIATION/ONCOLOGY SERIES
Discharge: HOME | End: 2024-01-24
Payer: MEDICARE

## 2024-01-24 PROCEDURE — 77300 RADIATION THERAPY DOSE PLAN: CPT | Performed by: RADIOLOGY

## 2024-01-24 PROCEDURE — 77334 RADIATION TREATMENT AID(S): CPT | Performed by: RADIOLOGY

## 2024-01-24 PROCEDURE — 77293 RESPIRATOR MOTION MGMT SIMUL: CPT | Performed by: RADIOLOGY

## 2024-01-24 PROCEDURE — 77295 3-D RADIOTHERAPY PLAN: CPT | Performed by: RADIOLOGY

## 2024-01-30 ENCOUNTER — APPOINTMENT (OUTPATIENT)
Dept: RADIATION ONCOLOGY | Facility: CLINIC | Age: 69
End: 2024-01-30
Payer: MEDICARE

## 2024-01-31 ENCOUNTER — APPOINTMENT (OUTPATIENT)
Dept: RADIATION ONCOLOGY | Facility: CLINIC | Age: 69
End: 2024-01-31
Payer: MEDICARE

## 2024-02-01 ENCOUNTER — APPOINTMENT (OUTPATIENT)
Dept: RADIATION ONCOLOGY | Facility: CLINIC | Age: 69
End: 2024-02-01
Payer: MEDICARE

## 2024-02-02 ENCOUNTER — APPOINTMENT (OUTPATIENT)
Dept: RADIATION ONCOLOGY | Facility: CLINIC | Age: 69
End: 2024-02-02
Payer: MEDICARE

## 2024-02-05 ENCOUNTER — APPOINTMENT (OUTPATIENT)
Dept: RADIATION ONCOLOGY | Facility: CLINIC | Age: 69
End: 2024-02-05
Payer: MEDICARE

## 2024-02-05 ENCOUNTER — HOSPITAL ENCOUNTER (OUTPATIENT)
Dept: RADIATION ONCOLOGY | Facility: CLINIC | Age: 69
Setting detail: RADIATION/ONCOLOGY SERIES
Discharge: HOME | End: 2024-02-05
Payer: MEDICARE

## 2024-02-05 DIAGNOSIS — C50.412 MALIGNANT NEOPLASM OF UPPER-OUTER QUADRANT OF LEFT FEMALE BREAST (MULTI): ICD-10-CM

## 2024-02-05 LAB
RAD ONC MSQ ACTUAL FRACTIONS DELIVERED: 1
RAD ONC MSQ ACTUAL SESSION DELIVERED DOSE: 266 CGRAY
RAD ONC MSQ ACTUAL TOTAL DOSE: 266 CGRAY
RAD ONC MSQ ELAPSED DAYS: 0
RAD ONC MSQ LAST DATE: NORMAL
RAD ONC MSQ PRESCRIBED FRACTIONAL DOSE: 266 CGRAY
RAD ONC MSQ PRESCRIBED NUMBER OF FRACTIONS: 16
RAD ONC MSQ PRESCRIBED TECHNIQUE: NORMAL
RAD ONC MSQ PRESCRIBED TOTAL DOSE: 4256 CGRAY
RAD ONC MSQ PRESCRIPTION PATTERN COMMENT: NORMAL
RAD ONC MSQ START DATE: NORMAL
RAD ONC MSQ TREATMENT COURSE NUMBER: 1
RAD ONC MSQ TREATMENT SITE: NORMAL

## 2024-02-05 PROCEDURE — 77280 THER RAD SIMULAJ FIELD SMPL: CPT | Performed by: RADIOLOGY

## 2024-02-05 PROCEDURE — 77412 RADIATION TX DELIVERY LVL 3: CPT | Performed by: RADIOLOGY

## 2024-02-06 ENCOUNTER — HOSPITAL ENCOUNTER (OUTPATIENT)
Dept: RADIATION ONCOLOGY | Facility: CLINIC | Age: 69
Setting detail: RADIATION/ONCOLOGY SERIES
Discharge: HOME | End: 2024-02-06
Payer: MEDICARE

## 2024-02-06 ENCOUNTER — APPOINTMENT (OUTPATIENT)
Dept: RADIATION ONCOLOGY | Facility: CLINIC | Age: 69
End: 2024-02-06
Payer: MEDICARE

## 2024-02-06 ENCOUNTER — RADIATION ONCOLOGY OTV (OUTPATIENT)
Dept: RADIATION ONCOLOGY | Facility: CLINIC | Age: 69
End: 2024-02-06
Payer: MEDICARE

## 2024-02-06 VITALS — TEMPERATURE: 98.4 F | WEIGHT: 125.8 LBS | BODY MASS INDEX: 25.23 KG/M2

## 2024-02-06 DIAGNOSIS — C50.412 MALIGNANT NEOPLASM OF UPPER-OUTER QUADRANT OF LEFT FEMALE BREAST (MULTI): ICD-10-CM

## 2024-02-06 DIAGNOSIS — Z51.0 ENCOUNTER FOR ANTINEOPLASTIC RADIATION THERAPY: ICD-10-CM

## 2024-02-06 LAB
RAD ONC MSQ ACTUAL FRACTIONS DELIVERED: 2
RAD ONC MSQ ACTUAL SESSION DELIVERED DOSE: 266 CGRAY
RAD ONC MSQ ACTUAL TOTAL DOSE: 532 CGRAY
RAD ONC MSQ ELAPSED DAYS: 1
RAD ONC MSQ LAST DATE: NORMAL
RAD ONC MSQ PRESCRIBED FRACTIONAL DOSE: 266 CGRAY
RAD ONC MSQ PRESCRIBED NUMBER OF FRACTIONS: 16
RAD ONC MSQ PRESCRIBED TECHNIQUE: NORMAL
RAD ONC MSQ PRESCRIBED TOTAL DOSE: 4256 CGRAY
RAD ONC MSQ PRESCRIPTION PATTERN COMMENT: NORMAL
RAD ONC MSQ START DATE: NORMAL
RAD ONC MSQ TREATMENT COURSE NUMBER: 1
RAD ONC MSQ TREATMENT SITE: NORMAL

## 2024-02-06 PROCEDURE — 77387 GUIDANCE FOR RADJ TX DLVR: CPT | Performed by: RADIOLOGY

## 2024-02-06 PROCEDURE — 77412 RADIATION TX DELIVERY LVL 3: CPT | Performed by: RADIOLOGY

## 2024-02-06 PROCEDURE — 77427 RADIATION TX MANAGEMENT X5: CPT | Performed by: RADIOLOGY

## 2024-02-06 PROCEDURE — 77014 CHG CT GUIDANCE RADIATION THERAPY FLDS PLACEMENT: CPT | Performed by: RADIOLOGY

## 2024-02-06 ASSESSMENT — PAIN SCALES - GENERAL: PAINLEVEL: 0-NO PAIN

## 2024-02-06 NOTE — PROGRESS NOTES
Radiation Oncology On Treatment Visit    Patient Name:  Zina Hernandez  MRN:  [id number]  :  1955    Referring Provider: No ref. provider found  Primary Care Provider: No Assigned PCP Generic Provider, MD  Care Team: Patient Care Team:  No Assigned Pcp Generic Provider, MD as PCP - General (Family Medicine)  Cony Amin MD as Consulting Physician (Hematology and Oncology)    Date of Service: 2024     Diagnosis:   Specialty Problems          Radiation Oncology Problems    Invasive lobular carcinoma of left breast in female (CMS/HCC)        Malignant neoplasm of upper-outer quadrant of left breast in female, estrogen receptor positive (CMS/HCC)         Treatment Summary:  Radiation Treatments       Active   L breast ABC (Started on 2024)   Most recent fraction: 266 cGy given on 2024   Total given: 532 cGy / 4,256 cGy  (2 of 16 fractions)   Elapsed Days: 1   Technique: Opposed Tangential           Completed   No historical radiation treatments to show.             SUBJECTIVE:  2nd treatment today, reviewed treatment plan and side effect management with patient. Patient denies pain. Patient considering omitting boost phase of treatment.    OBJECTIVE:   Vital Signs:  Temp 36.9 °C (98.4 °F) (Temporal)   Wt 57.1 kg (125 lb 12.8 oz)   BMI 25.23 kg/m²    Pain Scale: The patient's current pain level was assessed.  They report currently having a pain of 0 out of 10.    Other Pertinent Findings:     Toxicity Assessment          2024    09:00   Toxicity Assessment   Adverse Events Reviewed (WDL) Yes (Within Defined Limits)   Treatment Site Breast   Anorexia Grade 0   Dermatitis Radiation Grade 0   Fatigue Grade 0   Nausea Grade 0   Pain Grade 0   Breast Pain Grade 0        Assessment / Plan:  The patient is tolerating radiation therapy as anticipated.  Continue per current treatment plan.       Kervin Vuong MD, PhD  Attending Physician

## 2024-02-07 ENCOUNTER — HOSPITAL ENCOUNTER (OUTPATIENT)
Dept: RADIATION ONCOLOGY | Facility: CLINIC | Age: 69
Setting detail: RADIATION/ONCOLOGY SERIES
Discharge: HOME | End: 2024-02-07
Payer: MEDICARE

## 2024-02-07 ENCOUNTER — APPOINTMENT (OUTPATIENT)
Dept: RADIATION ONCOLOGY | Facility: CLINIC | Age: 69
End: 2024-02-07
Payer: MEDICARE

## 2024-02-07 DIAGNOSIS — Z51.0 ENCOUNTER FOR ANTINEOPLASTIC RADIATION THERAPY: ICD-10-CM

## 2024-02-07 DIAGNOSIS — C50.412 MALIGNANT NEOPLASM OF UPPER-OUTER QUADRANT OF LEFT FEMALE BREAST (MULTI): ICD-10-CM

## 2024-02-07 LAB
RAD ONC MSQ ACTUAL FRACTIONS DELIVERED: 3
RAD ONC MSQ ACTUAL SESSION DELIVERED DOSE: 266 CGRAY
RAD ONC MSQ ACTUAL TOTAL DOSE: 798 CGRAY
RAD ONC MSQ ELAPSED DAYS: 2
RAD ONC MSQ LAST DATE: NORMAL
RAD ONC MSQ PRESCRIBED FRACTIONAL DOSE: 266 CGRAY
RAD ONC MSQ PRESCRIBED NUMBER OF FRACTIONS: 16
RAD ONC MSQ PRESCRIBED TECHNIQUE: NORMAL
RAD ONC MSQ PRESCRIBED TOTAL DOSE: 4256 CGRAY
RAD ONC MSQ PRESCRIPTION PATTERN COMMENT: NORMAL
RAD ONC MSQ START DATE: NORMAL
RAD ONC MSQ TREATMENT COURSE NUMBER: 1
RAD ONC MSQ TREATMENT SITE: NORMAL

## 2024-02-07 PROCEDURE — 77336 RADIATION PHYSICS CONSULT: CPT | Performed by: RADIOLOGY

## 2024-02-07 PROCEDURE — 77014 CHG CT GUIDANCE RADIATION THERAPY FLDS PLACEMENT: CPT | Performed by: RADIOLOGY

## 2024-02-07 PROCEDURE — 77387 GUIDANCE FOR RADJ TX DLVR: CPT | Performed by: RADIOLOGY

## 2024-02-07 PROCEDURE — 77412 RADIATION TX DELIVERY LVL 3: CPT | Performed by: RADIOLOGY

## 2024-02-08 ENCOUNTER — APPOINTMENT (OUTPATIENT)
Dept: RADIATION ONCOLOGY | Facility: CLINIC | Age: 69
End: 2024-02-08
Payer: MEDICARE

## 2024-02-08 ENCOUNTER — HOSPITAL ENCOUNTER (OUTPATIENT)
Dept: RADIATION ONCOLOGY | Facility: CLINIC | Age: 69
Setting detail: RADIATION/ONCOLOGY SERIES
Discharge: HOME | End: 2024-02-08
Payer: MEDICARE

## 2024-02-08 DIAGNOSIS — C50.412 MALIGNANT NEOPLASM OF UPPER-OUTER QUADRANT OF LEFT FEMALE BREAST (MULTI): ICD-10-CM

## 2024-02-08 DIAGNOSIS — Z51.0 ENCOUNTER FOR ANTINEOPLASTIC RADIATION THERAPY: ICD-10-CM

## 2024-02-08 LAB
RAD ONC MSQ ACTUAL FRACTIONS DELIVERED: 4
RAD ONC MSQ ACTUAL SESSION DELIVERED DOSE: 266 CGRAY
RAD ONC MSQ ACTUAL TOTAL DOSE: 1064 CGRAY
RAD ONC MSQ ELAPSED DAYS: 3
RAD ONC MSQ LAST DATE: NORMAL
RAD ONC MSQ PRESCRIBED FRACTIONAL DOSE: 266 CGRAY
RAD ONC MSQ PRESCRIBED NUMBER OF FRACTIONS: 16
RAD ONC MSQ PRESCRIBED TECHNIQUE: NORMAL
RAD ONC MSQ PRESCRIBED TOTAL DOSE: 4256 CGRAY
RAD ONC MSQ PRESCRIPTION PATTERN COMMENT: NORMAL
RAD ONC MSQ START DATE: NORMAL
RAD ONC MSQ TREATMENT COURSE NUMBER: 1
RAD ONC MSQ TREATMENT SITE: NORMAL

## 2024-02-08 PROCEDURE — 77014 CHG CT GUIDANCE RADIATION THERAPY FLDS PLACEMENT: CPT | Performed by: RADIOLOGY

## 2024-02-08 PROCEDURE — 77412 RADIATION TX DELIVERY LVL 3: CPT | Performed by: RADIOLOGY

## 2024-02-08 PROCEDURE — 77387 GUIDANCE FOR RADJ TX DLVR: CPT | Performed by: RADIOLOGY

## 2024-02-09 ENCOUNTER — APPOINTMENT (OUTPATIENT)
Dept: RADIATION ONCOLOGY | Facility: CLINIC | Age: 69
End: 2024-02-09
Payer: MEDICARE

## 2024-02-09 ENCOUNTER — HOSPITAL ENCOUNTER (OUTPATIENT)
Dept: RADIATION ONCOLOGY | Facility: CLINIC | Age: 69
Setting detail: RADIATION/ONCOLOGY SERIES
Discharge: HOME | End: 2024-02-09
Payer: MEDICARE

## 2024-02-09 DIAGNOSIS — Z51.0 ENCOUNTER FOR ANTINEOPLASTIC RADIATION THERAPY: ICD-10-CM

## 2024-02-09 DIAGNOSIS — C50.412 MALIGNANT NEOPLASM OF UPPER-OUTER QUADRANT OF LEFT FEMALE BREAST (MULTI): ICD-10-CM

## 2024-02-09 LAB
RAD ONC MSQ ACTUAL FRACTIONS DELIVERED: 5
RAD ONC MSQ ACTUAL SESSION DELIVERED DOSE: 266 CGRAY
RAD ONC MSQ ACTUAL TOTAL DOSE: 1330 CGRAY
RAD ONC MSQ ELAPSED DAYS: 4
RAD ONC MSQ LAST DATE: NORMAL
RAD ONC MSQ PRESCRIBED FRACTIONAL DOSE: 266 CGRAY
RAD ONC MSQ PRESCRIBED NUMBER OF FRACTIONS: 16
RAD ONC MSQ PRESCRIBED TECHNIQUE: NORMAL
RAD ONC MSQ PRESCRIBED TOTAL DOSE: 4256 CGRAY
RAD ONC MSQ PRESCRIPTION PATTERN COMMENT: NORMAL
RAD ONC MSQ START DATE: NORMAL
RAD ONC MSQ TREATMENT COURSE NUMBER: 1
RAD ONC MSQ TREATMENT SITE: NORMAL

## 2024-02-09 PROCEDURE — 77387 GUIDANCE FOR RADJ TX DLVR: CPT | Performed by: RADIOLOGY

## 2024-02-09 PROCEDURE — 77412 RADIATION TX DELIVERY LVL 3: CPT | Performed by: RADIOLOGY

## 2024-02-09 PROCEDURE — 77014 CHG CT GUIDANCE RADIATION THERAPY FLDS PLACEMENT: CPT | Performed by: RADIOLOGY

## 2024-02-12 ENCOUNTER — HOSPITAL ENCOUNTER (OUTPATIENT)
Dept: RADIATION ONCOLOGY | Facility: CLINIC | Age: 69
Setting detail: RADIATION/ONCOLOGY SERIES
Discharge: HOME | End: 2024-02-12
Payer: MEDICARE

## 2024-02-12 ENCOUNTER — APPOINTMENT (OUTPATIENT)
Dept: RADIATION ONCOLOGY | Facility: CLINIC | Age: 69
End: 2024-02-12
Payer: MEDICARE

## 2024-02-12 DIAGNOSIS — C50.412 MALIGNANT NEOPLASM OF UPPER-OUTER QUADRANT OF LEFT FEMALE BREAST (MULTI): ICD-10-CM

## 2024-02-12 DIAGNOSIS — Z51.0 ENCOUNTER FOR ANTINEOPLASTIC RADIATION THERAPY: ICD-10-CM

## 2024-02-12 LAB
RAD ONC MSQ ACTUAL FRACTIONS DELIVERED: 6
RAD ONC MSQ ACTUAL SESSION DELIVERED DOSE: 266 CGRAY
RAD ONC MSQ ACTUAL TOTAL DOSE: 1596 CGRAY
RAD ONC MSQ ELAPSED DAYS: 7
RAD ONC MSQ LAST DATE: NORMAL
RAD ONC MSQ PRESCRIBED FRACTIONAL DOSE: 266 CGRAY
RAD ONC MSQ PRESCRIBED NUMBER OF FRACTIONS: 16
RAD ONC MSQ PRESCRIBED TECHNIQUE: NORMAL
RAD ONC MSQ PRESCRIBED TOTAL DOSE: 4256 CGRAY
RAD ONC MSQ PRESCRIPTION PATTERN COMMENT: NORMAL
RAD ONC MSQ START DATE: NORMAL
RAD ONC MSQ TREATMENT COURSE NUMBER: 1
RAD ONC MSQ TREATMENT SITE: NORMAL

## 2024-02-12 PROCEDURE — 77014 CHG CT GUIDANCE RADIATION THERAPY FLDS PLACEMENT: CPT | Performed by: RADIOLOGY

## 2024-02-12 PROCEDURE — 77387 GUIDANCE FOR RADJ TX DLVR: CPT | Performed by: RADIOLOGY

## 2024-02-12 PROCEDURE — 77412 RADIATION TX DELIVERY LVL 3: CPT | Performed by: RADIOLOGY

## 2024-02-13 ENCOUNTER — HOSPITAL ENCOUNTER (OUTPATIENT)
Dept: RADIATION ONCOLOGY | Facility: CLINIC | Age: 69
Setting detail: RADIATION/ONCOLOGY SERIES
Discharge: HOME | End: 2024-02-13
Payer: MEDICARE

## 2024-02-13 ENCOUNTER — OFFICE VISIT (OUTPATIENT)
Dept: HEMATOLOGY/ONCOLOGY | Facility: CLINIC | Age: 69
End: 2024-02-13
Payer: MEDICARE

## 2024-02-13 ENCOUNTER — RADIATION ONCOLOGY OTV (OUTPATIENT)
Dept: RADIATION ONCOLOGY | Facility: CLINIC | Age: 69
End: 2024-02-13

## 2024-02-13 ENCOUNTER — APPOINTMENT (OUTPATIENT)
Dept: RADIATION ONCOLOGY | Facility: CLINIC | Age: 69
End: 2024-02-13
Payer: MEDICARE

## 2024-02-13 ENCOUNTER — INFUSION (OUTPATIENT)
Dept: HEMATOLOGY/ONCOLOGY | Facility: CLINIC | Age: 69
End: 2024-02-13
Payer: MEDICARE

## 2024-02-13 VITALS
BODY MASS INDEX: 25.38 KG/M2 | RESPIRATION RATE: 16 BRPM | WEIGHT: 126.54 LBS | HEART RATE: 88 BPM | DIASTOLIC BLOOD PRESSURE: 84 MMHG | TEMPERATURE: 97.9 F | SYSTOLIC BLOOD PRESSURE: 154 MMHG | OXYGEN SATURATION: 96 %

## 2024-02-13 VITALS — TEMPERATURE: 96.4 F | BODY MASS INDEX: 24.95 KG/M2 | WEIGHT: 124.4 LBS

## 2024-02-13 DIAGNOSIS — C50.412 MALIGNANT NEOPLASM OF UPPER-OUTER QUADRANT OF LEFT FEMALE BREAST (MULTI): ICD-10-CM

## 2024-02-13 DIAGNOSIS — C50.912 INVASIVE LOBULAR CARCINOMA OF LEFT BREAST IN FEMALE (MULTI): Primary | ICD-10-CM

## 2024-02-13 DIAGNOSIS — Z51.0 ENCOUNTER FOR ANTINEOPLASTIC RADIATION THERAPY: ICD-10-CM

## 2024-02-13 DIAGNOSIS — Z79.811 USE OF ANASTROZOLE: ICD-10-CM

## 2024-02-13 DIAGNOSIS — C50.912 INVASIVE LOBULAR CARCINOMA OF LEFT BREAST IN FEMALE (MULTI): ICD-10-CM

## 2024-02-13 LAB
ALBUMIN SERPL BCP-MCNC: 4.1 G/DL (ref 3.4–5)
ALP SERPL-CCNC: 66 U/L (ref 33–136)
ALT SERPL W P-5'-P-CCNC: 14 U/L (ref 7–45)
ANION GAP SERPL CALC-SCNC: 13 MMOL/L (ref 10–20)
AST SERPL W P-5'-P-CCNC: 22 U/L (ref 9–39)
BASOPHILS # BLD AUTO: 0.02 X10*3/UL (ref 0–0.1)
BASOPHILS NFR BLD AUTO: 0.4 %
BILIRUB SERPL-MCNC: 0.3 MG/DL (ref 0–1.2)
BUN SERPL-MCNC: 14 MG/DL (ref 6–23)
CALCIUM SERPL-MCNC: 9.3 MG/DL (ref 8.6–10.3)
CHLORIDE SERPL-SCNC: 103 MMOL/L (ref 98–107)
CO2 SERPL-SCNC: 28 MMOL/L (ref 21–32)
CREAT SERPL-MCNC: 0.89 MG/DL (ref 0.5–1.05)
EGFRCR SERPLBLD CKD-EPI 2021: 71 ML/MIN/1.73M*2
EOSINOPHIL # BLD AUTO: 0.21 X10*3/UL (ref 0–0.7)
EOSINOPHIL NFR BLD AUTO: 4.4 %
ERYTHROCYTE [DISTWIDTH] IN BLOOD BY AUTOMATED COUNT: 13.8 % (ref 11.5–14.5)
GLUCOSE SERPL-MCNC: 101 MG/DL (ref 74–99)
HCT VFR BLD AUTO: 36.3 % (ref 36–46)
HGB BLD-MCNC: 11.6 G/DL (ref 12–16)
IMM GRANULOCYTES # BLD AUTO: 0.01 X10*3/UL (ref 0–0.7)
IMM GRANULOCYTES NFR BLD AUTO: 0.2 % (ref 0–0.9)
LYMPHOCYTES # BLD AUTO: 1.09 X10*3/UL (ref 1.2–4.8)
LYMPHOCYTES NFR BLD AUTO: 22.9 %
MCH RBC QN AUTO: 29.6 PG (ref 26–34)
MCHC RBC AUTO-ENTMCNC: 32 G/DL (ref 32–36)
MCV RBC AUTO: 93 FL (ref 80–100)
MONOCYTES # BLD AUTO: 0.43 X10*3/UL (ref 0.1–1)
MONOCYTES NFR BLD AUTO: 9 %
NEUTROPHILS # BLD AUTO: 3 X10*3/UL (ref 1.2–7.7)
NEUTROPHILS NFR BLD AUTO: 63.1 %
NRBC BLD-RTO: ABNORMAL /100{WBCS}
PLATELET # BLD AUTO: 224 X10*3/UL (ref 150–450)
POTASSIUM SERPL-SCNC: 3.7 MMOL/L (ref 3.5–5.3)
PROT SERPL-MCNC: 6.8 G/DL (ref 6.4–8.2)
RAD ONC MSQ ACTUAL FRACTIONS DELIVERED: 7
RAD ONC MSQ ACTUAL SESSION DELIVERED DOSE: 266 CGRAY
RAD ONC MSQ ACTUAL TOTAL DOSE: 1862 CGRAY
RAD ONC MSQ ELAPSED DAYS: 8
RAD ONC MSQ LAST DATE: NORMAL
RAD ONC MSQ PRESCRIBED FRACTIONAL DOSE: 266 CGRAY
RAD ONC MSQ PRESCRIBED NUMBER OF FRACTIONS: 16
RAD ONC MSQ PRESCRIBED TECHNIQUE: NORMAL
RAD ONC MSQ PRESCRIBED TOTAL DOSE: 4256 CGRAY
RAD ONC MSQ PRESCRIPTION PATTERN COMMENT: NORMAL
RAD ONC MSQ START DATE: NORMAL
RAD ONC MSQ TREATMENT COURSE NUMBER: 1
RAD ONC MSQ TREATMENT SITE: NORMAL
RBC # BLD AUTO: 3.92 X10*6/UL (ref 4–5.2)
SODIUM SERPL-SCNC: 140 MMOL/L (ref 136–145)
WBC # BLD AUTO: 4.8 X10*3/UL (ref 4.4–11.3)

## 2024-02-13 PROCEDURE — 1126F AMNT PAIN NOTED NONE PRSNT: CPT | Performed by: STUDENT IN AN ORGANIZED HEALTH CARE EDUCATION/TRAINING PROGRAM

## 2024-02-13 PROCEDURE — 84075 ASSAY ALKALINE PHOSPHATASE: CPT

## 2024-02-13 PROCEDURE — 77387 GUIDANCE FOR RADJ TX DLVR: CPT | Performed by: RADIOLOGY

## 2024-02-13 PROCEDURE — 2500000004 HC RX 250 GENERAL PHARMACY W/ HCPCS (ALT 636 FOR OP/ED): Performed by: STUDENT IN AN ORGANIZED HEALTH CARE EDUCATION/TRAINING PROGRAM

## 2024-02-13 PROCEDURE — 85025 COMPLETE CBC W/AUTO DIFF WBC: CPT

## 2024-02-13 PROCEDURE — 77427 RADIATION TX MANAGEMENT X5: CPT | Performed by: RADIOLOGY

## 2024-02-13 PROCEDURE — 2500000004 HC RX 250 GENERAL PHARMACY W/ HCPCS (ALT 636 FOR OP/ED): Mod: JZ,JG | Performed by: STUDENT IN AN ORGANIZED HEALTH CARE EDUCATION/TRAINING PROGRAM

## 2024-02-13 PROCEDURE — 99214 OFFICE O/P EST MOD 30 MIN: CPT | Performed by: STUDENT IN AN ORGANIZED HEALTH CARE EDUCATION/TRAINING PROGRAM

## 2024-02-13 PROCEDURE — 1159F MED LIST DOCD IN RCRD: CPT | Performed by: STUDENT IN AN ORGANIZED HEALTH CARE EDUCATION/TRAINING PROGRAM

## 2024-02-13 PROCEDURE — 77412 RADIATION TX DELIVERY LVL 3: CPT | Performed by: RADIOLOGY

## 2024-02-13 PROCEDURE — 96413 CHEMO IV INFUSION 1 HR: CPT

## 2024-02-13 PROCEDURE — 1036F TOBACCO NON-USER: CPT | Performed by: STUDENT IN AN ORGANIZED HEALTH CARE EDUCATION/TRAINING PROGRAM

## 2024-02-13 PROCEDURE — 77014 CHG CT GUIDANCE RADIATION THERAPY FLDS PLACEMENT: CPT | Performed by: RADIOLOGY

## 2024-02-13 RX ORDER — ALBUTEROL SULFATE 0.83 MG/ML
3 SOLUTION RESPIRATORY (INHALATION) AS NEEDED
Status: DISCONTINUED | OUTPATIENT
Start: 2024-02-13 | End: 2024-02-13 | Stop reason: HOSPADM

## 2024-02-13 RX ORDER — ALBUTEROL SULFATE 0.83 MG/ML
3 SOLUTION RESPIRATORY (INHALATION) AS NEEDED
Status: CANCELLED | OUTPATIENT
Start: 2024-04-16

## 2024-02-13 RX ORDER — PROCHLORPERAZINE EDISYLATE 5 MG/ML
10 INJECTION INTRAMUSCULAR; INTRAVENOUS EVERY 6 HOURS PRN
Status: CANCELLED | OUTPATIENT
Start: 2024-03-05

## 2024-02-13 RX ORDER — FAMOTIDINE 10 MG/ML
20 INJECTION INTRAVENOUS ONCE AS NEEDED
Status: CANCELLED | OUTPATIENT
Start: 2024-03-26

## 2024-02-13 RX ORDER — DIPHENHYDRAMINE HYDROCHLORIDE 50 MG/ML
50 INJECTION INTRAMUSCULAR; INTRAVENOUS AS NEEDED
Status: CANCELLED | OUTPATIENT
Start: 2024-03-26

## 2024-02-13 RX ORDER — DIPHENHYDRAMINE HYDROCHLORIDE 50 MG/ML
50 INJECTION INTRAMUSCULAR; INTRAVENOUS AS NEEDED
Status: DISCONTINUED | OUTPATIENT
Start: 2024-02-13 | End: 2024-02-13 | Stop reason: HOSPADM

## 2024-02-13 RX ORDER — DIPHENHYDRAMINE HYDROCHLORIDE 50 MG/ML
50 INJECTION INTRAMUSCULAR; INTRAVENOUS ONCE
Status: CANCELLED
Start: 2024-03-26 | End: 2024-03-26

## 2024-02-13 RX ORDER — EPINEPHRINE 0.3 MG/.3ML
0.3 INJECTION SUBCUTANEOUS EVERY 5 MIN PRN
Status: DISCONTINUED | OUTPATIENT
Start: 2024-02-13 | End: 2024-02-13 | Stop reason: HOSPADM

## 2024-02-13 RX ORDER — PROCHLORPERAZINE MALEATE 5 MG
10 TABLET ORAL EVERY 6 HOURS PRN
Status: CANCELLED | OUTPATIENT
Start: 2024-03-26

## 2024-02-13 RX ORDER — PROCHLORPERAZINE MALEATE 5 MG
10 TABLET ORAL EVERY 6 HOURS PRN
Status: CANCELLED | OUTPATIENT
Start: 2024-03-05

## 2024-02-13 RX ORDER — EPINEPHRINE 0.3 MG/.3ML
0.3 INJECTION SUBCUTANEOUS EVERY 5 MIN PRN
Status: CANCELLED | OUTPATIENT
Start: 2024-03-05

## 2024-02-13 RX ORDER — FAMOTIDINE 10 MG/ML
20 INJECTION INTRAVENOUS ONCE AS NEEDED
Status: DISCONTINUED | OUTPATIENT
Start: 2024-02-13 | End: 2024-02-13 | Stop reason: HOSPADM

## 2024-02-13 RX ORDER — HEPARIN 100 UNIT/ML
500 SYRINGE INTRAVENOUS AS NEEDED
Status: DISCONTINUED | OUTPATIENT
Start: 2024-02-13 | End: 2024-02-13 | Stop reason: HOSPADM

## 2024-02-13 RX ORDER — HEPARIN SODIUM,PORCINE/PF 10 UNIT/ML
50 SYRINGE (ML) INTRAVENOUS AS NEEDED
Status: CANCELLED | OUTPATIENT
Start: 2024-02-13

## 2024-02-13 RX ORDER — DIPHENHYDRAMINE HYDROCHLORIDE 50 MG/ML
50 INJECTION INTRAMUSCULAR; INTRAVENOUS ONCE
Status: CANCELLED
Start: 2024-04-16 | End: 2024-04-16

## 2024-02-13 RX ORDER — PROCHLORPERAZINE EDISYLATE 5 MG/ML
10 INJECTION INTRAMUSCULAR; INTRAVENOUS EVERY 6 HOURS PRN
Status: CANCELLED | OUTPATIENT
Start: 2024-03-26

## 2024-02-13 RX ORDER — PROCHLORPERAZINE MALEATE 10 MG
10 TABLET ORAL EVERY 6 HOURS PRN
Status: DISCONTINUED | OUTPATIENT
Start: 2024-02-13 | End: 2024-02-13 | Stop reason: HOSPADM

## 2024-02-13 RX ORDER — HEPARIN 100 UNIT/ML
500 SYRINGE INTRAVENOUS AS NEEDED
Status: CANCELLED | OUTPATIENT
Start: 2024-02-13

## 2024-02-13 RX ORDER — DIPHENHYDRAMINE HYDROCHLORIDE 50 MG/ML
50 INJECTION INTRAMUSCULAR; INTRAVENOUS ONCE
Status: CANCELLED
Start: 2024-03-05 | End: 2024-03-05

## 2024-02-13 RX ORDER — ALBUTEROL SULFATE 0.83 MG/ML
3 SOLUTION RESPIRATORY (INHALATION) AS NEEDED
Status: CANCELLED | OUTPATIENT
Start: 2024-03-05

## 2024-02-13 RX ORDER — ALBUTEROL SULFATE 0.83 MG/ML
3 SOLUTION RESPIRATORY (INHALATION) AS NEEDED
Status: CANCELLED | OUTPATIENT
Start: 2024-03-26

## 2024-02-13 RX ORDER — FAMOTIDINE 10 MG/ML
20 INJECTION INTRAVENOUS ONCE AS NEEDED
Status: CANCELLED | OUTPATIENT
Start: 2024-04-16

## 2024-02-13 RX ORDER — PROCHLORPERAZINE MALEATE 5 MG
10 TABLET ORAL EVERY 6 HOURS PRN
Status: CANCELLED | OUTPATIENT
Start: 2024-04-16

## 2024-02-13 RX ORDER — EPINEPHRINE 0.3 MG/.3ML
0.3 INJECTION SUBCUTANEOUS EVERY 5 MIN PRN
Status: CANCELLED | OUTPATIENT
Start: 2024-03-26

## 2024-02-13 RX ORDER — DIPHENHYDRAMINE HYDROCHLORIDE 50 MG/ML
50 INJECTION INTRAMUSCULAR; INTRAVENOUS AS NEEDED
Status: CANCELLED | OUTPATIENT
Start: 2024-03-05

## 2024-02-13 RX ORDER — EPINEPHRINE 0.3 MG/.3ML
0.3 INJECTION SUBCUTANEOUS EVERY 5 MIN PRN
Status: CANCELLED | OUTPATIENT
Start: 2024-04-16

## 2024-02-13 RX ORDER — HEPARIN SODIUM,PORCINE/PF 10 UNIT/ML
50 SYRINGE (ML) INTRAVENOUS AS NEEDED
Status: DISCONTINUED | OUTPATIENT
Start: 2024-02-13 | End: 2024-02-13 | Stop reason: HOSPADM

## 2024-02-13 RX ORDER — FAMOTIDINE 10 MG/ML
20 INJECTION INTRAVENOUS ONCE AS NEEDED
Status: CANCELLED | OUTPATIENT
Start: 2024-03-05

## 2024-02-13 RX ORDER — DIPHENHYDRAMINE HYDROCHLORIDE 50 MG/ML
50 INJECTION INTRAMUSCULAR; INTRAVENOUS AS NEEDED
Status: CANCELLED | OUTPATIENT
Start: 2024-04-16

## 2024-02-13 RX ORDER — ANASTROZOLE 1 MG/1
1 TABLET ORAL DAILY
Qty: 30 TABLET | Refills: 3 | Status: SHIPPED | OUTPATIENT
Start: 2024-02-13 | End: 2024-04-10 | Stop reason: WASHOUT

## 2024-02-13 RX ORDER — PROCHLORPERAZINE EDISYLATE 5 MG/ML
10 INJECTION INTRAMUSCULAR; INTRAVENOUS EVERY 6 HOURS PRN
Status: CANCELLED | OUTPATIENT
Start: 2024-04-16

## 2024-02-13 RX ORDER — PROCHLORPERAZINE EDISYLATE 5 MG/ML
10 INJECTION INTRAMUSCULAR; INTRAVENOUS EVERY 6 HOURS PRN
Status: DISCONTINUED | OUTPATIENT
Start: 2024-02-13 | End: 2024-02-13 | Stop reason: HOSPADM

## 2024-02-13 RX ADMIN — TRASTUZUMAB-ANNS 333 MG: 420 INJECTION, POWDER, LYOPHILIZED, FOR SOLUTION INTRAVENOUS at 15:15

## 2024-02-13 RX ADMIN — HEPARIN 500 UNITS: 100 SYRINGE at 15:56

## 2024-02-13 ASSESSMENT — ENCOUNTER SYMPTOMS
LOSS OF SENSATION IN FEET: 0
OCCASIONAL FEELINGS OF UNSTEADINESS: 0
DEPRESSION: 0

## 2024-02-13 ASSESSMENT — PAIN SCALES - GENERAL
PAINLEVEL: 0-NO PAIN
PAINLEVEL: 0-NO PAIN

## 2024-02-13 NOTE — PROGRESS NOTES
Patient ID: Zina Hernandez is a 68 y.o. female.    The patient presents to clinic today for her history of breast cancer.     Cancer Staging   No matching staging information was found for the patient.      Diagnostic/Therapeutic History:    The patient presents to clinic today for her history of breast cancer.     Diagnostic/Therapeutic History:  Cancer History:          Breast         AJCC Edition: 8th (AJCC), Diagnosis Date: 30-Aug-2023, IA, pT1c pN0 cM0 G2     Treatment Synopsis:    - On 6/14/2023 screening mammogram she had 2 adjacent irregular spiculated masses in the upper outer left breast at middle depth.  No suspicious masses or calcification  in the right breast.  BI-RADS Category 0     -On 6/28/2023 she had targeted ultrasound that showed at 1:00, 4 cm from the nipple an irregular hypoechoic mass measuring 3 x 5 x 4 mm this corresponds to the mammographic finding.  Left axilla showed 4 morphologically normal lymph nodes without lymphadenopathy      -On 7/14/2023 she had left breast mass ultrasound guided core needle  invasive lobular carcinoma, grade 2 with  ER 95%, [place] 20%, HER2 positive 3+, also had lobular carcinoma in situ     -On 8/30/2023 Dr. Blanchard performed left partial mastectomy with sentinel lymph node biopsy (0/1) 2 foci of cancer largest 1 measuring 13 mm, second 1 measuring 7 mm, margins were negative final stage PT1CN0     -on 01/02/2024: completed TH        History of Present Illness (HPI)/Interval History:    Currently undergoing radiation    no fever, no chills. No chest pain, no shortness of breath  Does have fatigue. No abdominal pain, no nausea, no vomitting.  Does have intermittent numbness in fingers and toes that is getting better. Overall doing okay       14 point review of system otherwise unremarkable     PMH: as above     Meds: allergies reviewed      Reproductive history: Menarche at 13, AFLB: 27,  menopause at 50, no HRT     Family history maternal grandmother with uterine  cancer in her 60s    She denies any fevers or chills.      Review of Systems:  14-point ROS otherwise negative, as per HPI.    Past Medical History:   Diagnosis Date    Cancer (CMS/HCC)     Migraine        Past Surgical History:   Procedure Laterality Date    BREAST LUMPECTOMY         Social History     Socioeconomic History    Marital status:      Spouse name: Not on file    Number of children: Not on file    Years of education: Not on file    Highest education level: Not on file   Occupational History    Not on file   Tobacco Use    Smoking status: Never    Smokeless tobacco: Never   Substance and Sexual Activity    Alcohol use: Never    Drug use: Never    Sexual activity: Not on file   Other Topics Concern    Not on file   Social History Narrative    Not on file     Social Determinants of Health     Financial Resource Strain: Not on file   Food Insecurity: Not on file   Transportation Needs: Not on file   Physical Activity: Not on file   Stress: Not on file   Social Connections: Not on file   Intimate Partner Violence: Not on file   Housing Stability: Not on file       Allergies   Allergen Reactions    Paclitaxel Other     Back pain and chest pain.     Tamiflu [Oseltamivir] Unknown         Current Outpatient Medications:     dexAMETHasone 0.5 mg/5 mL oral liquid, Take 80 mL (8 mg) by mouth 2 times a day for 1 day., Disp: 160 mL, Rfl: 0    dexAMETHasone 0.5 mg/5 mL oral liquid, Take 40 mL (4 mg) by mouth 2 times a day for 20 days., Disp: 1600 mL, Rfl: 0    loperamide (Imodium A-D) 1 mg/7.5 mL liquid, Take 15 mL (2 mg) by mouth., Disp: , Rfl:      Objective    BSA: 1.55 meters squared  /84 (BP Location: Right arm, Patient Position: Sitting)   Pulse 88   Temp 36.6 °C (97.9 °F) (Temporal)   Resp 16   Wt 57.4 kg (126 lb 8.7 oz)   SpO2 96%   BMI 25.38 kg/m²     ECOG Performance Status: 0    Physical Exam    Constitutional: Well developed, awake/alert/oriented  x3, no distress, alert and cooperative    Eyes: PERRL, EOMI, clear sclera   ENMT: mucous membranes moist, no apparent injury,  no lesions seen   Head/Neck: Neck supple, no apparent injury, thyroid  without mass or tenderness, No JVD, trachea midline, no bruits   Respiratory/Thorax: Patent airways, CTAB, normal  breath sounds with good chest expansion, thorax symmetric   Cardiovascular: Regular, rate and rhythm, no murmurs,  2+ equal pulses of the extremities, normal S 1and S 2   Gastrointestinal: Nondistended, soft, non-tender,  no rebound tenderness or guarding, no masses palpable, no organomegaly, +BS, no bruits   Extremities: normal extremities, no cyanosis edema,  contusions or wounds, no clubbing   Breast: Left surgical incision healed well,  no new nodules or lymphadenopathy.  No right breast nodules or lymphadenopathy        Laboratory Data:  Lab Results   Component Value Date    WBC 5.6 01/02/2024    HGB 10.2 (L) 01/02/2024    HCT 31.2 (L) 01/02/2024    MCV 92 01/02/2024     01/02/2024    ANC 2.31 11/21/2023       Chemistry    Lab Results   Component Value Date/Time     01/02/2024 1033    K 3.7 01/02/2024 1033     01/02/2024 1033    CO2 26 01/02/2024 1033    BUN 23 01/02/2024 1033    CREATININE 0.84 01/02/2024 1033    Lab Results   Component Value Date/Time    CALCIUM 9.4 01/02/2024 1033    ALKPHOS 74 01/02/2024 1033    AST 30 01/02/2024 1033    ALT 44 01/02/2024 1033    BILITOT 0.3 01/02/2024 1033             Radiology:  Rad Onc CT Sim Images  These images are not reportable by radiology and will not be interpreted   by  Radiologists.       BI mammo bilateral screening tomosynthesis 06/14/2023    Narrative  Interpreted By:  ENZO MANUEL MD  MRN: [id number]  Patient Name: HUNTER VELA    STUDY:  DIGITAL MAMM SCREENING W/ RUBI;  6/14/2023 8:00 am    ACCESSION NUMBER(S):  [id number]    ORDERING CLINICIAN:  KATIE HINOJOSA    INDICATION:  Screening.    COMPARISON:  09/08/2021, 07/17/2020, 01/17/2019    FINDINGS:  2D and  tomosynthesis images were reviewed at 1 mm slice thickness.    There are areas of scattered fibroglandular tissue.  There are 2  adjacent irregular spiculated equal density masses in the upper outer  left breast at middle depth. No suspicious masses or calcifications  are identified in the right breast.    Impression  No mammographic evidence of malignancy in the right breast. Left  breast mass x2.    BI-RADS CATEGORY:    Category: 0 - Incomplete; Need Additional Imaging Evaluation and/or  Prior Mammograms for Comparison.  Recommendation: Ultrasound Recommended.    For any future breast imaging appointments, please call [phone]).    Patient letter sent SADEVAL          Assessment/Plan:    68-year-old female previously healthy found to have on screening mammogram 2 adjacent irregular spiculated masses in the upper outer left breast at middle depth with  US showing a 5mm mass with left axilla showing 4 negative LN s/p  guided core needle  invasive lobular carcinoma, grade 2 with  ER 95%, [place] 20%, HER2 positive 3+, also had lobular carcinoma in situ. Dr. Blanchard performed left partial mastectomy with sentinel  lymph node biopsy (0/1) 2 foci of cancer largest 1 measuring 13 mm, second 1 measuring 7 mm, margins were negative final stage PT1CN0. She is presenting to discuss adjuvant treatment options.      I reviewed with her the events that led to her diagnosis of breast cancer. We reviewed all the procedures and diagnostic imaging she underwent thus far. I discussed the features of her breast cancer that include the size, grade, lymph node status and  hormone receptor/ her2-ying status.     Based on APT trial recommended to proceed with THx12 weeks followed by radiation followed by hormonal therapy and herceptin to complete for a total of 1 year     Based on the updated 10 year results of the APT trial published in the lancet in March of this year:      410 patients were enrolled and 406 were given adjuvant  paclitaxel and trastuzumab and included in the analysis. Mean age at enrolment was 55 years (SD 10·5), 405 (99·8%) of 406 patients were female and one (0·2%) was male, 350 (86·2%) were  White, 28 (6·9%) were Black or , and 272 (67·0%) had hormone receptor-positive disease. After a median follow-up of 10·8 years (IQR 7·1-11·4), among 406 patients included in the analysis population, we observed 31  invasive disease-free survival events, of which six (19·4%) were locoregional ipsilateral recurrences, nine (29·0%) were new contralateral breast cancers, six (19·4%) were distant recurrences, and ten (32·3%) were all-cause deaths.  10-year invasive disease-free survival was 91·3% (95% CI 88·3-94·4), 10-year recurrence-free interval was 96·3% (95% CI 94·3-98·3), 10-year overall survival was 94·3% (95% CI 91·8-96·8), and 10-year breast  cancer-specific survival was 98·8% (95% CI 97·6-100)    on 01/02/2024: completed TH-     Plan:     -  radiation therapy with 40.05 Gy in 15 fractions WBRT with ABC - currently undergoing radiation therapy  - Plan for herceptin maintenance for a total of 1 year (to finish in oct 2024)  - anastrozole to be started after radiation therapy- prescription sent and baseline DEXA scan ordered     At least 35 minutes of direct consultation was spent reviewing the patient's chart as well as discussing and  reviewing the  cancer care plan including educating and answering  questions and concerns, greater than 50 percent spent in counseling and coordination  of care.            Cony Amin MD  Hematology and Medical Oncology  [facility]

## 2024-02-13 NOTE — SIGNIFICANT EVENT
02/13/24 1428   Prechemo Checklist   Has the patient been in the hospital, ED, or urgent care since last date of service No   Chemo/Immuno Consent Signed Yes   Protocol/Indications Verified Yes   Confirmed to previous date/time of medication Yes   Compared to previous dose Yes   All medications are dated accurately Yes   Pregnancy Test Negative Not applicable   Parameters Met Yes   BSA/Weight-Height Verified Yes   Dose Calculations Verified Yes

## 2024-02-13 NOTE — PROGRESS NOTES
Pt seen and assessed today by Dr. Amin.  Pt did not have any additional symptoms/side effects, questions, or concerns during infusion appointment today.

## 2024-02-14 ENCOUNTER — APPOINTMENT (OUTPATIENT)
Dept: RADIATION ONCOLOGY | Facility: CLINIC | Age: 69
End: 2024-02-14
Payer: MEDICARE

## 2024-02-14 ENCOUNTER — HOSPITAL ENCOUNTER (OUTPATIENT)
Dept: RADIATION ONCOLOGY | Facility: CLINIC | Age: 69
Setting detail: RADIATION/ONCOLOGY SERIES
Discharge: HOME | End: 2024-02-14
Payer: MEDICARE

## 2024-02-14 DIAGNOSIS — C50.412 MALIGNANT NEOPLASM OF UPPER-OUTER QUADRANT OF LEFT FEMALE BREAST (MULTI): ICD-10-CM

## 2024-02-14 DIAGNOSIS — Z51.0 ENCOUNTER FOR ANTINEOPLASTIC RADIATION THERAPY: ICD-10-CM

## 2024-02-14 LAB
RAD ONC MSQ ACTUAL FRACTIONS DELIVERED: 8
RAD ONC MSQ ACTUAL SESSION DELIVERED DOSE: 266 CGRAY
RAD ONC MSQ ACTUAL TOTAL DOSE: 2128 CGRAY
RAD ONC MSQ ELAPSED DAYS: 9
RAD ONC MSQ LAST DATE: NORMAL
RAD ONC MSQ PRESCRIBED FRACTIONAL DOSE: 266 CGRAY
RAD ONC MSQ PRESCRIBED NUMBER OF FRACTIONS: 16
RAD ONC MSQ PRESCRIBED TECHNIQUE: NORMAL
RAD ONC MSQ PRESCRIBED TOTAL DOSE: 4256 CGRAY
RAD ONC MSQ PRESCRIPTION PATTERN COMMENT: NORMAL
RAD ONC MSQ START DATE: NORMAL
RAD ONC MSQ TREATMENT COURSE NUMBER: 1
RAD ONC MSQ TREATMENT SITE: NORMAL

## 2024-02-14 PROCEDURE — 77014 CHG CT GUIDANCE RADIATION THERAPY FLDS PLACEMENT: CPT | Performed by: RADIOLOGY

## 2024-02-14 PROCEDURE — 77387 GUIDANCE FOR RADJ TX DLVR: CPT | Performed by: RADIOLOGY

## 2024-02-14 PROCEDURE — 77412 RADIATION TX DELIVERY LVL 3: CPT | Performed by: RADIOLOGY

## 2024-02-14 PROCEDURE — 77336 RADIATION PHYSICS CONSULT: CPT | Performed by: RADIOLOGY

## 2024-02-14 NOTE — PROGRESS NOTES
Radiation Oncology On Treatment Visit    Patient Name:  Zina Hernandez  MRN:  [id number]  :  1955    Referring Provider: No ref. provider found  Primary Care Provider: No Assigned PCP Generic Provider, MD  Care Team: Patient Care Team:  No Assigned Pcp Generic Provider, MD as PCP - General (Family Medicine)  Cony Amin MD as Consulting Physician (Hematology and Oncology)    Date of Service: 2024     Diagnosis:   Specialty Problems          Radiation Oncology Problems    Invasive lobular carcinoma of left breast in female (CMS/HCC)        Malignant neoplasm of upper-outer quadrant of left breast in female, estrogen receptor positive (CMS/HCC)         Treatment Summary:  Radiation Treatments       Active   L breast ABC (Started on 2024)   Most recent fraction: 266 cGy given on 2024   Total given: 2,128 cGy / 4,256 cGy  (8 of 16 fractions)   Elapsed Days: 9   Technique: Opposed Tangential           Completed   No historical radiation treatments to show.             SUBJECTIVE: Patient tolerating radiation treatment without complaint. No new changes. Denies fatigue, swelling, skin changes, pain, itchiness. No other issues this week. Toxicity as noted below       OBJECTIVE:   Vital Signs:  Temp 35.8 °C (96.4 °F) (Temporal)   Wt 56.4 kg (124 lb 6.4 oz)   BMI 24.95 kg/m²    Pain Scale: The patient's current pain level was assessed.  They report currently having a pain of 0 out of 10.    Other Pertinent Findings:     Toxicity Assessment          2024    09:00   Toxicity Assessment   Adverse Events Reviewed (WDL) Yes (Within Defined Limits)   Treatment Site Breast   Anorexia Grade 0   Dermatitis Radiation Grade 0   Fatigue Grade 0   Nausea Grade 0   Pain Grade 0   Breast Pain Grade 0      No new toxicity this week.    Assessment / Plan:  The patient is tolerating radiation therapy as anticipated.  Continue per current treatment plan.   Will plan to omit boost per patients request.    Kervin JENSEN  Yevgeniy CORBETT, PhD  Attending Physician

## 2024-02-15 ENCOUNTER — APPOINTMENT (OUTPATIENT)
Dept: RADIATION ONCOLOGY | Facility: CLINIC | Age: 69
End: 2024-02-15
Payer: MEDICARE

## 2024-02-15 ENCOUNTER — HOSPITAL ENCOUNTER (OUTPATIENT)
Dept: RADIATION ONCOLOGY | Facility: CLINIC | Age: 69
Setting detail: RADIATION/ONCOLOGY SERIES
Discharge: HOME | End: 2024-02-15
Payer: MEDICARE

## 2024-02-15 DIAGNOSIS — C50.412 MALIGNANT NEOPLASM OF UPPER-OUTER QUADRANT OF LEFT FEMALE BREAST (MULTI): ICD-10-CM

## 2024-02-15 DIAGNOSIS — Z51.0 ENCOUNTER FOR ANTINEOPLASTIC RADIATION THERAPY: ICD-10-CM

## 2024-02-15 LAB
RAD ONC MSQ ACTUAL FRACTIONS DELIVERED: 9
RAD ONC MSQ ACTUAL SESSION DELIVERED DOSE: 266 CGRAY
RAD ONC MSQ ACTUAL TOTAL DOSE: 2394 CGRAY
RAD ONC MSQ ELAPSED DAYS: 10
RAD ONC MSQ LAST DATE: NORMAL
RAD ONC MSQ PRESCRIBED FRACTIONAL DOSE: 266 CGRAY
RAD ONC MSQ PRESCRIBED NUMBER OF FRACTIONS: 16
RAD ONC MSQ PRESCRIBED TECHNIQUE: NORMAL
RAD ONC MSQ PRESCRIBED TOTAL DOSE: 4256 CGRAY
RAD ONC MSQ PRESCRIPTION PATTERN COMMENT: NORMAL
RAD ONC MSQ START DATE: NORMAL
RAD ONC MSQ TREATMENT COURSE NUMBER: 1
RAD ONC MSQ TREATMENT SITE: NORMAL

## 2024-02-15 PROCEDURE — 77412 RADIATION TX DELIVERY LVL 3: CPT | Performed by: RADIOLOGY

## 2024-02-15 PROCEDURE — 77387 GUIDANCE FOR RADJ TX DLVR: CPT | Performed by: RADIOLOGY

## 2024-02-15 PROCEDURE — 77014 CHG CT GUIDANCE RADIATION THERAPY FLDS PLACEMENT: CPT | Performed by: RADIOLOGY

## 2024-02-16 ENCOUNTER — HOSPITAL ENCOUNTER (OUTPATIENT)
Dept: RADIATION ONCOLOGY | Facility: CLINIC | Age: 69
Setting detail: RADIATION/ONCOLOGY SERIES
Discharge: HOME | End: 2024-02-16
Payer: MEDICARE

## 2024-02-16 ENCOUNTER — APPOINTMENT (OUTPATIENT)
Dept: RADIATION ONCOLOGY | Facility: CLINIC | Age: 69
End: 2024-02-16
Payer: MEDICARE

## 2024-02-16 DIAGNOSIS — C50.412 MALIGNANT NEOPLASM OF UPPER-OUTER QUADRANT OF LEFT FEMALE BREAST (MULTI): ICD-10-CM

## 2024-02-16 DIAGNOSIS — Z51.0 ENCOUNTER FOR ANTINEOPLASTIC RADIATION THERAPY: ICD-10-CM

## 2024-02-16 LAB
RAD ONC MSQ ACTUAL FRACTIONS DELIVERED: 10
RAD ONC MSQ ACTUAL SESSION DELIVERED DOSE: 266 CGRAY
RAD ONC MSQ ACTUAL TOTAL DOSE: 2660 CGRAY
RAD ONC MSQ ELAPSED DAYS: 11
RAD ONC MSQ LAST DATE: NORMAL
RAD ONC MSQ PRESCRIBED FRACTIONAL DOSE: 266 CGRAY
RAD ONC MSQ PRESCRIBED NUMBER OF FRACTIONS: 16
RAD ONC MSQ PRESCRIBED TECHNIQUE: NORMAL
RAD ONC MSQ PRESCRIBED TOTAL DOSE: 4256 CGRAY
RAD ONC MSQ PRESCRIPTION PATTERN COMMENT: NORMAL
RAD ONC MSQ START DATE: NORMAL
RAD ONC MSQ TREATMENT COURSE NUMBER: 1
RAD ONC MSQ TREATMENT SITE: NORMAL

## 2024-02-16 PROCEDURE — 77387 GUIDANCE FOR RADJ TX DLVR: CPT | Performed by: RADIOLOGY

## 2024-02-16 PROCEDURE — 77412 RADIATION TX DELIVERY LVL 3: CPT | Performed by: RADIOLOGY

## 2024-02-16 PROCEDURE — 77014 CHG CT GUIDANCE RADIATION THERAPY FLDS PLACEMENT: CPT | Performed by: RADIOLOGY

## 2024-02-19 ENCOUNTER — APPOINTMENT (OUTPATIENT)
Dept: RADIATION ONCOLOGY | Facility: CLINIC | Age: 69
End: 2024-02-19
Payer: MEDICARE

## 2024-02-19 ENCOUNTER — HOSPITAL ENCOUNTER (OUTPATIENT)
Dept: RADIATION ONCOLOGY | Facility: CLINIC | Age: 69
Setting detail: RADIATION/ONCOLOGY SERIES
Discharge: HOME | End: 2024-02-19
Payer: MEDICARE

## 2024-02-19 DIAGNOSIS — C50.412 MALIGNANT NEOPLASM OF UPPER-OUTER QUADRANT OF LEFT FEMALE BREAST (MULTI): ICD-10-CM

## 2024-02-19 DIAGNOSIS — Z51.0 ENCOUNTER FOR ANTINEOPLASTIC RADIATION THERAPY: ICD-10-CM

## 2024-02-19 LAB
RAD ONC MSQ ACTUAL FRACTIONS DELIVERED: 11
RAD ONC MSQ ACTUAL SESSION DELIVERED DOSE: 266 CGRAY
RAD ONC MSQ ACTUAL TOTAL DOSE: 2926 CGRAY
RAD ONC MSQ ELAPSED DAYS: 14
RAD ONC MSQ LAST DATE: NORMAL
RAD ONC MSQ PRESCRIBED FRACTIONAL DOSE: 266 CGRAY
RAD ONC MSQ PRESCRIBED NUMBER OF FRACTIONS: 16
RAD ONC MSQ PRESCRIBED TECHNIQUE: NORMAL
RAD ONC MSQ PRESCRIBED TOTAL DOSE: 4256 CGRAY
RAD ONC MSQ PRESCRIPTION PATTERN COMMENT: NORMAL
RAD ONC MSQ START DATE: NORMAL
RAD ONC MSQ TREATMENT COURSE NUMBER: 1
RAD ONC MSQ TREATMENT SITE: NORMAL

## 2024-02-19 PROCEDURE — 77014 CHG CT GUIDANCE RADIATION THERAPY FLDS PLACEMENT: CPT | Performed by: RADIOLOGY

## 2024-02-19 PROCEDURE — 77387 GUIDANCE FOR RADJ TX DLVR: CPT | Performed by: RADIOLOGY

## 2024-02-19 PROCEDURE — 77412 RADIATION TX DELIVERY LVL 3: CPT | Performed by: RADIOLOGY

## 2024-02-20 ENCOUNTER — APPOINTMENT (OUTPATIENT)
Dept: RADIATION ONCOLOGY | Facility: CLINIC | Age: 69
End: 2024-02-20
Payer: MEDICARE

## 2024-02-20 ENCOUNTER — HOSPITAL ENCOUNTER (OUTPATIENT)
Dept: RADIATION ONCOLOGY | Facility: CLINIC | Age: 69
Setting detail: RADIATION/ONCOLOGY SERIES
Discharge: HOME | End: 2024-02-20
Payer: MEDICARE

## 2024-02-20 ENCOUNTER — RADIATION ONCOLOGY OTV (OUTPATIENT)
Dept: RADIATION ONCOLOGY | Facility: CLINIC | Age: 69
End: 2024-02-20

## 2024-02-20 DIAGNOSIS — C50.412 MALIGNANT NEOPLASM OF UPPER-OUTER QUADRANT OF LEFT FEMALE BREAST (MULTI): ICD-10-CM

## 2024-02-20 DIAGNOSIS — Z51.0 ENCOUNTER FOR ANTINEOPLASTIC RADIATION THERAPY: ICD-10-CM

## 2024-02-20 LAB
RAD ONC MSQ ACTUAL FRACTIONS DELIVERED: 12
RAD ONC MSQ ACTUAL SESSION DELIVERED DOSE: 266 CGRAY
RAD ONC MSQ ACTUAL TOTAL DOSE: 3192 CGRAY
RAD ONC MSQ ELAPSED DAYS: 15
RAD ONC MSQ LAST DATE: NORMAL
RAD ONC MSQ PRESCRIBED FRACTIONAL DOSE: 266 CGRAY
RAD ONC MSQ PRESCRIBED NUMBER OF FRACTIONS: 16
RAD ONC MSQ PRESCRIBED TECHNIQUE: NORMAL
RAD ONC MSQ PRESCRIBED TOTAL DOSE: 4256 CGRAY
RAD ONC MSQ PRESCRIPTION PATTERN COMMENT: NORMAL
RAD ONC MSQ START DATE: NORMAL
RAD ONC MSQ TREATMENT COURSE NUMBER: 1
RAD ONC MSQ TREATMENT SITE: NORMAL

## 2024-02-20 PROCEDURE — 77387 GUIDANCE FOR RADJ TX DLVR: CPT | Performed by: RADIOLOGY

## 2024-02-20 PROCEDURE — 77014 CHG CT GUIDANCE RADIATION THERAPY FLDS PLACEMENT: CPT | Performed by: RADIOLOGY

## 2024-02-20 PROCEDURE — 77427 RADIATION TX MANAGEMENT X5: CPT | Performed by: RADIOLOGY

## 2024-02-20 PROCEDURE — 77412 RADIATION TX DELIVERY LVL 3: CPT | Performed by: RADIOLOGY

## 2024-02-20 NOTE — PROGRESS NOTES
Radiation Oncology On Treatment Visit    Patient Name:  Zina Hernandez  MRN:  [id number]  :  1955    Referring Provider: No ref. provider found  Primary Care Provider: No Assigned PCP Generic Provider, MD  Care Team: Patient Care Team:  No Assigned Pcp Generic Provider, MD as PCP - General (Family Medicine)  Cony Amin MD as Consulting Physician (Hematology and Oncology)    Date of Service: 2024     Diagnosis:   Specialty Problems          Radiation Oncology Problems    Invasive lobular carcinoma of left breast in female (CMS/HCC)        Malignant neoplasm of upper-outer quadrant of left breast in female, estrogen receptor positive (CMS/HCC)         Treatment Summary:  Radiation Treatments       Active   L breast ABC (Started on 2024)   Most recent fraction: 266 cGy given on 2024   Total given: 3,192 cGy / 4,256 cGy  (12 of 16 fractions)   Elapsed Days: 15   Technique: Opposed Tangential           Completed   No historical radiation treatments to show.             SUBJECTIVE: Patient tolerating radiation treatment without complaint. No new changes. Denies fatigue, swelling, skin changes, pain, itchiness. No other issues this week. Toxicity as noted below    OBJECTIVE:   Vital Signs:  There were no vitals taken for this visit.   Pain Scale: The patient's current pain level was assessed.  They report currently having a pain of 0 out of 10.    Other Pertinent Findings:     Toxicity Assessment          2024    09:00 2024    09:00   Toxicity Assessment   Adverse Events Reviewed (WDL) Yes (Within Defined Limits) Yes (Within Defined Limits)   Treatment Site Breast Breast   Anorexia Grade 0 Grade 0   Dermatitis Radiation Grade 0 Grade 0   Fatigue Grade 0 Grade 0   Nausea Grade 0 Grade 0   Pain Grade 0 Grade 0   Breast Pain Grade 0 Grade 0        Assessment / Plan:  The patient is tolerating radiation therapy as anticipated.  Continue per current treatment plan.   Will plan to omit  boost.    Kervin Vuong MD, PhD  Attending Physician

## 2024-02-21 ENCOUNTER — HOSPITAL ENCOUNTER (OUTPATIENT)
Dept: RADIATION ONCOLOGY | Facility: CLINIC | Age: 69
Setting detail: RADIATION/ONCOLOGY SERIES
Discharge: HOME | End: 2024-02-21
Payer: MEDICARE

## 2024-02-21 ENCOUNTER — APPOINTMENT (OUTPATIENT)
Dept: RADIATION ONCOLOGY | Facility: CLINIC | Age: 69
End: 2024-02-21
Payer: MEDICARE

## 2024-02-21 DIAGNOSIS — C50.412 MALIGNANT NEOPLASM OF UPPER-OUTER QUADRANT OF LEFT FEMALE BREAST (MULTI): ICD-10-CM

## 2024-02-21 DIAGNOSIS — Z51.0 ENCOUNTER FOR ANTINEOPLASTIC RADIATION THERAPY: ICD-10-CM

## 2024-02-21 LAB
RAD ONC MSQ ACTUAL FRACTIONS DELIVERED: 13
RAD ONC MSQ ACTUAL SESSION DELIVERED DOSE: 266 CGRAY
RAD ONC MSQ ACTUAL TOTAL DOSE: 3458 CGRAY
RAD ONC MSQ ELAPSED DAYS: 16
RAD ONC MSQ LAST DATE: NORMAL
RAD ONC MSQ PRESCRIBED FRACTIONAL DOSE: 266 CGRAY
RAD ONC MSQ PRESCRIBED NUMBER OF FRACTIONS: 16
RAD ONC MSQ PRESCRIBED TECHNIQUE: NORMAL
RAD ONC MSQ PRESCRIBED TOTAL DOSE: 4256 CGRAY
RAD ONC MSQ PRESCRIPTION PATTERN COMMENT: NORMAL
RAD ONC MSQ START DATE: NORMAL
RAD ONC MSQ TREATMENT COURSE NUMBER: 1
RAD ONC MSQ TREATMENT SITE: NORMAL

## 2024-02-21 PROCEDURE — 77336 RADIATION PHYSICS CONSULT: CPT | Performed by: RADIOLOGY

## 2024-02-21 PROCEDURE — 77412 RADIATION TX DELIVERY LVL 3: CPT | Performed by: RADIOLOGY

## 2024-02-21 PROCEDURE — 77387 GUIDANCE FOR RADJ TX DLVR: CPT | Performed by: RADIOLOGY

## 2024-02-21 PROCEDURE — 77014 CHG CT GUIDANCE RADIATION THERAPY FLDS PLACEMENT: CPT | Performed by: RADIOLOGY

## 2024-02-22 ENCOUNTER — HOSPITAL ENCOUNTER (OUTPATIENT)
Dept: RADIATION ONCOLOGY | Facility: CLINIC | Age: 69
Setting detail: RADIATION/ONCOLOGY SERIES
Discharge: HOME | End: 2024-02-22
Payer: MEDICARE

## 2024-02-22 ENCOUNTER — HOSPITAL ENCOUNTER (OUTPATIENT)
Dept: RADIOLOGY | Facility: HOSPITAL | Age: 69
Discharge: HOME | End: 2024-02-22
Payer: MEDICARE

## 2024-02-22 ENCOUNTER — APPOINTMENT (OUTPATIENT)
Dept: RADIATION ONCOLOGY | Facility: CLINIC | Age: 69
End: 2024-02-22
Payer: MEDICARE

## 2024-02-22 DIAGNOSIS — Z79.811 USE OF ANASTROZOLE: ICD-10-CM

## 2024-02-22 DIAGNOSIS — C50.412 MALIGNANT NEOPLASM OF UPPER-OUTER QUADRANT OF LEFT FEMALE BREAST (MULTI): ICD-10-CM

## 2024-02-22 DIAGNOSIS — Z51.0 ENCOUNTER FOR ANTINEOPLASTIC RADIATION THERAPY: ICD-10-CM

## 2024-02-22 DIAGNOSIS — C50.912 INVASIVE LOBULAR CARCINOMA OF LEFT BREAST IN FEMALE (MULTI): ICD-10-CM

## 2024-02-22 LAB
RAD ONC MSQ ACTUAL FRACTIONS DELIVERED: 14
RAD ONC MSQ ACTUAL SESSION DELIVERED DOSE: 266 CGRAY
RAD ONC MSQ ACTUAL TOTAL DOSE: 3724 CGRAY
RAD ONC MSQ ELAPSED DAYS: 17
RAD ONC MSQ LAST DATE: NORMAL
RAD ONC MSQ PRESCRIBED FRACTIONAL DOSE: 266 CGRAY
RAD ONC MSQ PRESCRIBED NUMBER OF FRACTIONS: 16
RAD ONC MSQ PRESCRIBED TECHNIQUE: NORMAL
RAD ONC MSQ PRESCRIBED TOTAL DOSE: 4256 CGRAY
RAD ONC MSQ PRESCRIPTION PATTERN COMMENT: NORMAL
RAD ONC MSQ START DATE: NORMAL
RAD ONC MSQ TREATMENT COURSE NUMBER: 1
RAD ONC MSQ TREATMENT SITE: NORMAL

## 2024-02-22 PROCEDURE — 77014 CHG CT GUIDANCE RADIATION THERAPY FLDS PLACEMENT: CPT | Performed by: RADIOLOGY

## 2024-02-22 PROCEDURE — 77412 RADIATION TX DELIVERY LVL 3: CPT | Performed by: RADIOLOGY

## 2024-02-22 PROCEDURE — 77080 DXA BONE DENSITY AXIAL: CPT

## 2024-02-22 PROCEDURE — 77387 GUIDANCE FOR RADJ TX DLVR: CPT | Performed by: RADIOLOGY

## 2024-02-23 ENCOUNTER — APPOINTMENT (OUTPATIENT)
Dept: RADIATION ONCOLOGY | Facility: CLINIC | Age: 69
End: 2024-02-23
Payer: MEDICARE

## 2024-02-23 ENCOUNTER — HOSPITAL ENCOUNTER (OUTPATIENT)
Dept: RADIATION ONCOLOGY | Facility: CLINIC | Age: 69
Setting detail: RADIATION/ONCOLOGY SERIES
Discharge: HOME | End: 2024-02-23
Payer: MEDICARE

## 2024-02-23 DIAGNOSIS — C50.412 MALIGNANT NEOPLASM OF UPPER-OUTER QUADRANT OF LEFT FEMALE BREAST (MULTI): ICD-10-CM

## 2024-02-23 DIAGNOSIS — Z51.0 ENCOUNTER FOR ANTINEOPLASTIC RADIATION THERAPY: ICD-10-CM

## 2024-02-23 LAB
RAD ONC MSQ ACTUAL FRACTIONS DELIVERED: 15
RAD ONC MSQ ACTUAL SESSION DELIVERED DOSE: 266 CGRAY
RAD ONC MSQ ACTUAL TOTAL DOSE: 3990 CGRAY
RAD ONC MSQ ELAPSED DAYS: 18
RAD ONC MSQ LAST DATE: NORMAL
RAD ONC MSQ PRESCRIBED FRACTIONAL DOSE: 266 CGRAY
RAD ONC MSQ PRESCRIBED NUMBER OF FRACTIONS: 16
RAD ONC MSQ PRESCRIBED TECHNIQUE: NORMAL
RAD ONC MSQ PRESCRIBED TOTAL DOSE: 4256 CGRAY
RAD ONC MSQ PRESCRIPTION PATTERN COMMENT: NORMAL
RAD ONC MSQ START DATE: NORMAL
RAD ONC MSQ TREATMENT COURSE NUMBER: 1
RAD ONC MSQ TREATMENT SITE: NORMAL

## 2024-02-23 PROCEDURE — 77014 CHG CT GUIDANCE RADIATION THERAPY FLDS PLACEMENT: CPT | Performed by: RADIOLOGY

## 2024-02-23 PROCEDURE — 77387 GUIDANCE FOR RADJ TX DLVR: CPT | Performed by: RADIOLOGY

## 2024-02-23 PROCEDURE — 77412 RADIATION TX DELIVERY LVL 3: CPT | Performed by: RADIOLOGY

## 2024-02-26 ENCOUNTER — HOSPITAL ENCOUNTER (OUTPATIENT)
Dept: RADIATION ONCOLOGY | Facility: CLINIC | Age: 69
Setting detail: RADIATION/ONCOLOGY SERIES
End: 2024-02-26
Payer: MEDICARE

## 2024-02-27 ENCOUNTER — HOSPITAL ENCOUNTER (OUTPATIENT)
Dept: RADIATION ONCOLOGY | Facility: CLINIC | Age: 69
Setting detail: RADIATION/ONCOLOGY SERIES
Discharge: HOME | End: 2024-02-27
Payer: MEDICARE

## 2024-02-27 ENCOUNTER — DOCUMENTATION (OUTPATIENT)
Dept: RADIATION ONCOLOGY | Facility: CLINIC | Age: 69
End: 2024-02-27

## 2024-02-27 ENCOUNTER — APPOINTMENT (OUTPATIENT)
Dept: RADIATION ONCOLOGY | Facility: CLINIC | Age: 69
End: 2024-02-27
Payer: MEDICARE

## 2024-02-27 DIAGNOSIS — Z51.0 ENCOUNTER FOR ANTINEOPLASTIC RADIATION THERAPY: ICD-10-CM

## 2024-02-27 DIAGNOSIS — C50.412 MALIGNANT NEOPLASM OF UPPER-OUTER QUADRANT OF LEFT FEMALE BREAST (MULTI): ICD-10-CM

## 2024-02-27 LAB
RAD ONC MSQ ACTUAL FRACTIONS DELIVERED: 16
RAD ONC MSQ ACTUAL SESSION DELIVERED DOSE: 266 CGRAY
RAD ONC MSQ ACTUAL TOTAL DOSE: 4256 CGRAY
RAD ONC MSQ ELAPSED DAYS: 22
RAD ONC MSQ LAST DATE: NORMAL
RAD ONC MSQ PRESCRIBED FRACTIONAL DOSE: 266 CGRAY
RAD ONC MSQ PRESCRIBED NUMBER OF FRACTIONS: 16
RAD ONC MSQ PRESCRIBED TECHNIQUE: NORMAL
RAD ONC MSQ PRESCRIBED TOTAL DOSE: 4256 CGRAY
RAD ONC MSQ PRESCRIPTION PATTERN COMMENT: NORMAL
RAD ONC MSQ START DATE: NORMAL
RAD ONC MSQ TREATMENT COURSE NUMBER: 1
RAD ONC MSQ TREATMENT SITE: NORMAL

## 2024-02-27 PROCEDURE — 77387 GUIDANCE FOR RADJ TX DLVR: CPT | Performed by: STUDENT IN AN ORGANIZED HEALTH CARE EDUCATION/TRAINING PROGRAM

## 2024-02-27 PROCEDURE — 77014 CHG CT GUIDANCE RADIATION THERAPY FLDS PLACEMENT: CPT | Performed by: STUDENT IN AN ORGANIZED HEALTH CARE EDUCATION/TRAINING PROGRAM

## 2024-02-27 PROCEDURE — 77412 RADIATION TX DELIVERY LVL 3: CPT | Performed by: RADIOLOGY

## 2024-02-28 ENCOUNTER — APPOINTMENT (OUTPATIENT)
Dept: RADIATION ONCOLOGY | Facility: CLINIC | Age: 69
End: 2024-02-28
Payer: MEDICARE

## 2024-02-29 ENCOUNTER — TELEPHONE (OUTPATIENT)
Dept: HEMATOLOGY/ONCOLOGY | Facility: HOSPITAL | Age: 69
End: 2024-02-29
Payer: MEDICARE

## 2024-02-29 ENCOUNTER — APPOINTMENT (OUTPATIENT)
Dept: RADIATION ONCOLOGY | Facility: CLINIC | Age: 69
End: 2024-02-29
Payer: MEDICARE

## 2024-02-29 NOTE — PROGRESS NOTES
Radiation Oncology Treatment Summary    Patient Name:  Zina Hernandez  MRN:  [id number]  :  1955    Referring Provider: No ref. provider found  Primary Care Provider: No Assigned PCP Generic Provider, MD    Brief History: Zina Hernandez is a 68 y.o. female with No matching staging information was found for the patient..  The patient completed radiotherapy as outlined below.    Radiation Treatment Summary:    Radiation Treatments       Active   No active radiation treatments to show.     Completed   L breast ABC (Started on 2024)   Most recent fraction: 266 cGy given on 2024   Total given: 4,256 cGy / 4,256 cGy  (16 of 16 fractions)   Elapsed Days: 22   Technique: Opposed Tangential                     Please see the patient's [facility] chart for further details regarding the radiation plan, including beam energy.    Concurrent Chemotherapy:  Treatment Plans       Name Type Plan Dates Plan Provider         Active    PACLitaxel + Trastuzumab, Weekly Followed by Trastuzumab - Breast Oncology Treatment  10/16/2023 - Present Cony Amin MD                    CTCAE Toxicity Overview:   Toxicity Assessment          2024    09:00 2024    09:00   Toxicity Assessment   Adverse Events Reviewed (WDL) Yes (Within Defined Limits) Yes (Within Defined Limits)   Treatment Site Breast Breast   Anorexia Grade 0 Grade 0   Dermatitis Radiation Grade 0 Grade 0   Fatigue Grade 0 Grade 0   Nausea Grade 0 Grade 0   Pain Grade 0 Grade 0   Breast Pain Grade 0 Grade 0     Patient Disposition: Follow up  2024     Kervin Vuong MD, PhD  Attending Physician

## 2024-02-29 NOTE — TELEPHONE ENCOUNTER
Pt called- said Dr. Amin wanted her to call in with a list of her vitamins that she takes:    Vit C 500mg once daily  Vit D3 2000IU once daily  Calcium 650mg + Vit D 500IU and Vit K 40mcg- once daily  Vit B12 3000mcg once daily  Collagen powder: Vit C 50mg, Vit A 450mcg, Vit D 30mcg, Vit E 10mg, Biotin 50mcg & Zinc 5mg  She also takes a Vitafusion women's multivitamin.    She wants to know if all of these doses are okay or does she need more? She said some of them say to take twice per day but she only takes once. Also, she was supposed to finish radiation this past Monday and start her Anastrozole on 3/4, but the radiation machine was broken so now she finished on Tuesday. She wants to know if she needs to wait until next Tuesday to start Anastrozole or can she start Monday? She is due for Herceptin Tuesday.    Also- she tried to send Dr. Amin a [employer] message but he wasn't listed under her provider list to message and it won't let her type in his name. Is this something the team has to release for her to be able to message? If not I can give her the [employer] IT number to call.    Message sent to the team.

## 2024-03-01 ENCOUNTER — APPOINTMENT (OUTPATIENT)
Dept: RADIATION ONCOLOGY | Facility: CLINIC | Age: 69
End: 2024-03-01
Payer: MEDICARE

## 2024-03-05 ENCOUNTER — INFUSION (OUTPATIENT)
Dept: HEMATOLOGY/ONCOLOGY | Facility: CLINIC | Age: 69
End: 2024-03-05
Payer: MEDICARE

## 2024-03-05 ENCOUNTER — APPOINTMENT (OUTPATIENT)
Dept: HEMATOLOGY/ONCOLOGY | Facility: CLINIC | Age: 69
End: 2024-03-05
Payer: MEDICARE

## 2024-03-05 VITALS
TEMPERATURE: 96.8 F | DIASTOLIC BLOOD PRESSURE: 86 MMHG | SYSTOLIC BLOOD PRESSURE: 149 MMHG | WEIGHT: 124.6 LBS | RESPIRATION RATE: 18 BRPM | HEART RATE: 82 BPM | OXYGEN SATURATION: 97 % | BODY MASS INDEX: 24.99 KG/M2

## 2024-03-05 DIAGNOSIS — C50.912 INVASIVE LOBULAR CARCINOMA OF LEFT BREAST IN FEMALE (MULTI): ICD-10-CM

## 2024-03-05 PROCEDURE — 2500000004 HC RX 250 GENERAL PHARMACY W/ HCPCS (ALT 636 FOR OP/ED): Performed by: STUDENT IN AN ORGANIZED HEALTH CARE EDUCATION/TRAINING PROGRAM

## 2024-03-05 PROCEDURE — 96413 CHEMO IV INFUSION 1 HR: CPT

## 2024-03-05 RX ORDER — PROCHLORPERAZINE EDISYLATE 5 MG/ML
10 INJECTION INTRAMUSCULAR; INTRAVENOUS EVERY 6 HOURS PRN
Status: DISCONTINUED | OUTPATIENT
Start: 2024-03-05 | End: 2024-03-05 | Stop reason: HOSPADM

## 2024-03-05 RX ORDER — FAMOTIDINE 10 MG/ML
20 INJECTION INTRAVENOUS ONCE AS NEEDED
Status: DISCONTINUED | OUTPATIENT
Start: 2024-03-05 | End: 2024-03-05 | Stop reason: HOSPADM

## 2024-03-05 RX ORDER — HEPARIN SODIUM,PORCINE/PF 10 UNIT/ML
50 SYRINGE (ML) INTRAVENOUS AS NEEDED
Status: CANCELLED | OUTPATIENT
Start: 2024-03-05

## 2024-03-05 RX ORDER — HEPARIN 100 UNIT/ML
500 SYRINGE INTRAVENOUS AS NEEDED
Status: CANCELLED | OUTPATIENT
Start: 2024-03-05

## 2024-03-05 RX ORDER — HEPARIN 100 UNIT/ML
500 SYRINGE INTRAVENOUS AS NEEDED
Status: DISCONTINUED | OUTPATIENT
Start: 2024-03-05 | End: 2024-03-05 | Stop reason: HOSPADM

## 2024-03-05 RX ORDER — PROCHLORPERAZINE MALEATE 10 MG
10 TABLET ORAL EVERY 6 HOURS PRN
Status: DISCONTINUED | OUTPATIENT
Start: 2024-03-05 | End: 2024-03-05 | Stop reason: HOSPADM

## 2024-03-05 RX ORDER — DIPHENHYDRAMINE HYDROCHLORIDE 50 MG/ML
50 INJECTION INTRAMUSCULAR; INTRAVENOUS AS NEEDED
Status: DISCONTINUED | OUTPATIENT
Start: 2024-03-05 | End: 2024-03-05 | Stop reason: HOSPADM

## 2024-03-05 RX ORDER — EPINEPHRINE 0.3 MG/.3ML
0.3 INJECTION SUBCUTANEOUS EVERY 5 MIN PRN
Status: DISCONTINUED | OUTPATIENT
Start: 2024-03-05 | End: 2024-03-05 | Stop reason: HOSPADM

## 2024-03-05 RX ORDER — ALBUTEROL SULFATE 0.83 MG/ML
3 SOLUTION RESPIRATORY (INHALATION) AS NEEDED
Status: DISCONTINUED | OUTPATIENT
Start: 2024-03-05 | End: 2024-03-05 | Stop reason: HOSPADM

## 2024-03-05 RX ADMIN — TRASTUZUMAB-ANNS 333.9 MG: 420 INJECTION, POWDER, LYOPHILIZED, FOR SOLUTION INTRAVENOUS at 10:04

## 2024-03-05 RX ADMIN — HEPARIN 500 UNITS: 100 SYRINGE at 10:41

## 2024-03-05 ASSESSMENT — PAIN SCALES - GENERAL: PAINLEVEL: 0-NO PAIN

## 2024-03-05 NOTE — SIGNIFICANT EVENT
03/05/24 0925   Prechemo Checklist   Has the patient been in the hospital, ED, or urgent care since last date of service No   Chemo/Immuno Consent Signed Yes   Protocol/Indications Verified Yes   Confirmed to previous date/time of medication Yes   Compared to previous dose Yes   All medications are dated accurately Yes   Pregnancy Test Negative Not applicable   Parameters Met Yes   BSA/Weight-Height Verified Yes   Dose Calculations Verified Yes

## 2024-03-26 ENCOUNTER — OFFICE VISIT (OUTPATIENT)
Dept: HEMATOLOGY/ONCOLOGY | Facility: CLINIC | Age: 69
End: 2024-03-26
Payer: MEDICARE

## 2024-03-26 ENCOUNTER — INFUSION (OUTPATIENT)
Dept: HEMATOLOGY/ONCOLOGY | Facility: CLINIC | Age: 69
End: 2024-03-26
Payer: MEDICARE

## 2024-03-26 VITALS
HEART RATE: 90 BPM | DIASTOLIC BLOOD PRESSURE: 81 MMHG | OXYGEN SATURATION: 95 % | BODY MASS INDEX: 25.33 KG/M2 | SYSTOLIC BLOOD PRESSURE: 158 MMHG | WEIGHT: 126.32 LBS | TEMPERATURE: 97.2 F | RESPIRATION RATE: 18 BRPM

## 2024-03-26 VITALS — BODY MASS INDEX: 25.1 KG/M2 | HEIGHT: 59 IN

## 2024-03-26 DIAGNOSIS — C50.912 INVASIVE LOBULAR CARCINOMA OF LEFT BREAST IN FEMALE (MULTI): ICD-10-CM

## 2024-03-26 DIAGNOSIS — C50.912 INVASIVE LOBULAR CARCINOMA OF LEFT BREAST IN FEMALE (MULTI): Primary | ICD-10-CM

## 2024-03-26 PROCEDURE — 96413 CHEMO IV INFUSION 1 HR: CPT

## 2024-03-26 PROCEDURE — 2500000004 HC RX 250 GENERAL PHARMACY W/ HCPCS (ALT 636 FOR OP/ED): Mod: JG | Performed by: STUDENT IN AN ORGANIZED HEALTH CARE EDUCATION/TRAINING PROGRAM

## 2024-03-26 PROCEDURE — 2500000004 HC RX 250 GENERAL PHARMACY W/ HCPCS (ALT 636 FOR OP/ED): Performed by: STUDENT IN AN ORGANIZED HEALTH CARE EDUCATION/TRAINING PROGRAM

## 2024-03-26 PROCEDURE — 1036F TOBACCO NON-USER: CPT | Performed by: STUDENT IN AN ORGANIZED HEALTH CARE EDUCATION/TRAINING PROGRAM

## 2024-03-26 PROCEDURE — 1159F MED LIST DOCD IN RCRD: CPT | Performed by: STUDENT IN AN ORGANIZED HEALTH CARE EDUCATION/TRAINING PROGRAM

## 2024-03-26 PROCEDURE — 99214 OFFICE O/P EST MOD 30 MIN: CPT | Performed by: STUDENT IN AN ORGANIZED HEALTH CARE EDUCATION/TRAINING PROGRAM

## 2024-03-26 PROCEDURE — 1125F AMNT PAIN NOTED PAIN PRSNT: CPT | Performed by: STUDENT IN AN ORGANIZED HEALTH CARE EDUCATION/TRAINING PROGRAM

## 2024-03-26 RX ORDER — HEPARIN 100 UNIT/ML
500 SYRINGE INTRAVENOUS AS NEEDED
Status: DISCONTINUED | OUTPATIENT
Start: 2024-03-26 | End: 2024-03-26 | Stop reason: HOSPADM

## 2024-03-26 RX ORDER — PROCHLORPERAZINE MALEATE 5 MG
10 TABLET ORAL EVERY 6 HOURS PRN
OUTPATIENT
Start: 2024-05-28

## 2024-03-26 RX ORDER — EPINEPHRINE 0.3 MG/.3ML
0.3 INJECTION SUBCUTANEOUS EVERY 5 MIN PRN
Status: DISCONTINUED | OUTPATIENT
Start: 2024-03-26 | End: 2024-03-26 | Stop reason: HOSPADM

## 2024-03-26 RX ORDER — FAMOTIDINE 10 MG/ML
20 INJECTION INTRAVENOUS ONCE AS NEEDED
OUTPATIENT
Start: 2024-06-18

## 2024-03-26 RX ORDER — EPINEPHRINE 0.3 MG/.3ML
0.3 INJECTION SUBCUTANEOUS EVERY 5 MIN PRN
OUTPATIENT
Start: 2024-05-28

## 2024-03-26 RX ORDER — PROCHLORPERAZINE MALEATE 5 MG
10 TABLET ORAL EVERY 6 HOURS PRN
OUTPATIENT
Start: 2024-06-18

## 2024-03-26 RX ORDER — PROCHLORPERAZINE EDISYLATE 5 MG/ML
10 INJECTION INTRAMUSCULAR; INTRAVENOUS EVERY 6 HOURS PRN
OUTPATIENT
Start: 2024-05-28

## 2024-03-26 RX ORDER — PROCHLORPERAZINE EDISYLATE 5 MG/ML
10 INJECTION INTRAMUSCULAR; INTRAVENOUS EVERY 6 HOURS PRN
OUTPATIENT
Start: 2024-06-18

## 2024-03-26 RX ORDER — DIPHENHYDRAMINE HYDROCHLORIDE 50 MG/ML
50 INJECTION INTRAMUSCULAR; INTRAVENOUS AS NEEDED
OUTPATIENT
Start: 2024-05-28

## 2024-03-26 RX ORDER — PROCHLORPERAZINE MALEATE 5 MG
10 TABLET ORAL EVERY 6 HOURS PRN
Status: CANCELLED | OUTPATIENT
Start: 2024-05-07

## 2024-03-26 RX ORDER — HEPARIN SODIUM,PORCINE/PF 10 UNIT/ML
50 SYRINGE (ML) INTRAVENOUS AS NEEDED
Status: CANCELLED | OUTPATIENT
Start: 2024-03-26

## 2024-03-26 RX ORDER — PROCHLORPERAZINE EDISYLATE 5 MG/ML
10 INJECTION INTRAMUSCULAR; INTRAVENOUS EVERY 6 HOURS PRN
Status: DISCONTINUED | OUTPATIENT
Start: 2024-03-26 | End: 2024-03-26 | Stop reason: HOSPADM

## 2024-03-26 RX ORDER — FAMOTIDINE 10 MG/ML
20 INJECTION INTRAVENOUS ONCE AS NEEDED
Status: DISCONTINUED | OUTPATIENT
Start: 2024-03-26 | End: 2024-03-26 | Stop reason: HOSPADM

## 2024-03-26 RX ORDER — ALBUTEROL SULFATE 0.83 MG/ML
3 SOLUTION RESPIRATORY (INHALATION) AS NEEDED
Status: DISCONTINUED | OUTPATIENT
Start: 2024-03-26 | End: 2024-03-26 | Stop reason: HOSPADM

## 2024-03-26 RX ORDER — PROCHLORPERAZINE EDISYLATE 5 MG/ML
10 INJECTION INTRAMUSCULAR; INTRAVENOUS EVERY 6 HOURS PRN
Status: CANCELLED | OUTPATIENT
Start: 2024-05-07

## 2024-03-26 RX ORDER — ALBUTEROL SULFATE 0.83 MG/ML
3 SOLUTION RESPIRATORY (INHALATION) AS NEEDED
OUTPATIENT
Start: 2024-06-18

## 2024-03-26 RX ORDER — PROCHLORPERAZINE MALEATE 10 MG
10 TABLET ORAL EVERY 6 HOURS PRN
Status: DISCONTINUED | OUTPATIENT
Start: 2024-03-26 | End: 2024-03-26 | Stop reason: HOSPADM

## 2024-03-26 RX ORDER — HEPARIN 100 UNIT/ML
500 SYRINGE INTRAVENOUS AS NEEDED
Status: CANCELLED | OUTPATIENT
Start: 2024-03-26

## 2024-03-26 RX ORDER — ALBUTEROL SULFATE 0.83 MG/ML
3 SOLUTION RESPIRATORY (INHALATION) AS NEEDED
OUTPATIENT
Start: 2024-05-28

## 2024-03-26 RX ORDER — ALBUTEROL SULFATE 0.83 MG/ML
3 SOLUTION RESPIRATORY (INHALATION) AS NEEDED
Status: CANCELLED | OUTPATIENT
Start: 2024-05-07

## 2024-03-26 RX ORDER — FAMOTIDINE 10 MG/ML
20 INJECTION INTRAVENOUS ONCE AS NEEDED
Status: CANCELLED | OUTPATIENT
Start: 2024-05-07

## 2024-03-26 RX ORDER — FAMOTIDINE 10 MG/ML
20 INJECTION INTRAVENOUS ONCE AS NEEDED
OUTPATIENT
Start: 2024-05-28

## 2024-03-26 RX ORDER — EPINEPHRINE 0.3 MG/.3ML
0.3 INJECTION SUBCUTANEOUS EVERY 5 MIN PRN
OUTPATIENT
Start: 2024-06-18

## 2024-03-26 RX ORDER — DIPHENHYDRAMINE HYDROCHLORIDE 50 MG/ML
50 INJECTION INTRAMUSCULAR; INTRAVENOUS AS NEEDED
OUTPATIENT
Start: 2024-06-18

## 2024-03-26 RX ORDER — DIPHENHYDRAMINE HYDROCHLORIDE 50 MG/ML
50 INJECTION INTRAMUSCULAR; INTRAVENOUS AS NEEDED
Status: CANCELLED | OUTPATIENT
Start: 2024-05-07

## 2024-03-26 RX ORDER — DIPHENHYDRAMINE HYDROCHLORIDE 50 MG/ML
50 INJECTION INTRAMUSCULAR; INTRAVENOUS AS NEEDED
Status: DISCONTINUED | OUTPATIENT
Start: 2024-03-26 | End: 2024-03-26 | Stop reason: HOSPADM

## 2024-03-26 RX ORDER — EPINEPHRINE 0.3 MG/.3ML
0.3 INJECTION SUBCUTANEOUS EVERY 5 MIN PRN
Status: CANCELLED | OUTPATIENT
Start: 2024-05-07

## 2024-03-26 RX ADMIN — HEPARIN 500 UNITS: 100 SYRINGE at 11:58

## 2024-03-26 RX ADMIN — TRASTUZUMAB-ANNS 333.9 MG: 420 INJECTION, POWDER, LYOPHILIZED, FOR SOLUTION INTRAVENOUS at 11:17

## 2024-03-26 ASSESSMENT — PAIN SCALES - GENERAL: PAINLEVEL: 1

## 2024-03-26 NOTE — SIGNIFICANT EVENT
03/26/24 1044   Prechemo Checklist   Has the patient been in the hospital, ED, or urgent care since last date of service No   Chemo/Immuno Consent Signed Yes   Protocol/Indications Verified Yes   Confirmed to previous date/time of medication Yes   Compared to previous dose Yes   Pregnancy Test Negative Not applicable   Parameters Met Yes   BSA/Weight-Height Verified Yes   Dose Calculations Verified Yes

## 2024-03-26 NOTE — PROGRESS NOTES
Patient ID: Zina Hernandez is a 68 y.o. female.    The patient presents to clinic today for her history of breast cancer.     Cancer Staging   No matching staging information was found for the patient.      Diagnostic/Therapeutic History:    The patient presents to clinic today for her history of breast cancer.     Diagnostic/Therapeutic History:  Cancer History:          Breast         AJCC Edition: 8th (AJCC), Diagnosis Date: 30-Aug-2023, IA, pT1c pN0 cM0 G2     Treatment Synopsis:    - On 6/14/2023 screening mammogram she had 2 adjacent irregular spiculated masses in the upper outer left breast at middle depth.  No suspicious masses or calcification  in the right breast.  BI-RADS Category 0     -On 6/28/2023 she had targeted ultrasound that showed at 1:00, 4 cm from the nipple an irregular hypoechoic mass measuring 3 x 5 x 4 mm this corresponds to the mammographic finding.  Left axilla showed 4 morphologically normal lymph nodes without lymphadenopathy      -On 7/14/2023 she had left breast mass ultrasound guided core needle  invasive lobular carcinoma, grade 2 with  ER 95%, MD 20%, HER2 positive 3+, also had lobular carcinoma in situ     -On 8/30/2023 Dr. Blanchard performed left partial mastectomy with sentinel lymph node biopsy (0/1) 2 foci of cancer largest 1 measuring 13 mm, second 1 measuring 7 mm, margins were negative final stage PT1CN0     -on 01/02/2024: completed TH    - On 02/20/2024: completed radiation therapy      History of Present Illness (HPI)/Interval History:    Started anastrozole  on Ca/vit D   She has diarrhea on a daily basis as well as intermittent cramping  No nausea, no vomiting  No fever, no chills  Neuropathy is no longer there           14 point review of system otherwise unremarkable     PMH: as above     Meds: allergies reviewed      Reproductive history: Menarche at 13, AFLB: 27,  menopause at 50, no HRT     Family history maternal grandmother with uterine cancer in her 60s    She  denies any fevers or chills.      Review of Systems:  14-point ROS otherwise negative, as per HPI.    Past Medical History:   Diagnosis Date    Cancer (CMS/HCC)     Migraine        Past Surgical History:   Procedure Laterality Date    BREAST LUMPECTOMY         Social History     Socioeconomic History    Marital status:      Spouse name: None    Number of children: None    Years of education: None    Highest education level: None   Occupational History    None   Tobacco Use    Smoking status: Never    Smokeless tobacco: Never   Substance and Sexual Activity    Alcohol use: Never    Drug use: Never    Sexual activity: None   Other Topics Concern    None   Social History Narrative    None     Social Determinants of Health     Financial Resource Strain: Not on file   Food Insecurity: Not on file   Transportation Needs: Not on file   Physical Activity: Not on file   Stress: Not on file   Social Connections: Not on file   Intimate Partner Violence: Not on file   Housing Stability: Not on file       Allergies   Allergen Reactions    Paclitaxel Other     Back pain and chest pain.     Tamiflu [Oseltamivir] Unknown         Current Outpatient Medications:     anastrozole (Arimidex) 1 mg tablet, Take 1 tablet (1 mg total) by mouth once daily.  Swallow whole with a drink of water., Disp: 30 tablet, Rfl: 3    loperamide (Imodium A-D) 1 mg/7.5 mL liquid, Take 15 mL (2 mg) by mouth., Disp: , Rfl:     dexAMETHasone 0.5 mg/5 mL oral liquid, Take 80 mL (8 mg) by mouth 2 times a day for 1 day., Disp: 160 mL, Rfl: 0    dexAMETHasone 0.5 mg/5 mL oral liquid, Take 40 mL (4 mg) by mouth 2 times a day for 20 days., Disp: 1600 mL, Rfl: 0     Objective    BSA: 1.55 meters squared  /81 (BP Location: Left arm, Patient Position: Sitting)   Pulse 90   Temp 36.2 °C (97.2 °F) (Temporal)   Resp 18   Wt 57.3 kg (126 lb 5.2 oz)   SpO2 95%   BMI 25.33 kg/m²     ECOG Performance Status: 0    Physical Exam    Constitutional: Well  developed, awake/alert/oriented  x3, no distress, alert and cooperative   Eyes: PERRL, EOMI, clear sclera   ENMT: mucous membranes moist, no apparent injury,  no lesions seen   Head/Neck: Neck supple, no apparent injury, thyroid  without mass or tenderness, No JVD, trachea midline, no bruits   Respiratory/Thorax: Patent airways, CTAB, normal  breath sounds with good chest expansion, thorax symmetric   Cardiovascular: Regular, rate and rhythm, no murmurs,  2+ equal pulses of the extremities, normal S 1and S 2   Gastrointestinal: Nondistended, soft, non-tender,  no rebound tenderness or guarding, no masses palpable, no organomegaly, +BS, no bruits   Extremities: normal extremities, no cyanosis edema,  contusions or wounds, no clubbing   Breast: Left surgical incision healed well,  no new nodules or lymphadenopathy.  No right breast nodules or lymphadenopathy        Laboratory Data:  Lab Results   Component Value Date    WBC 4.8 02/13/2024    HGB 11.6 (L) 02/13/2024    HCT 36.3 02/13/2024    MCV 93 02/13/2024     02/13/2024    ANC 2.31 11/21/2023       Chemistry    Lab Results   Component Value Date/Time     02/13/2024 1406    K 3.7 02/13/2024 1406     02/13/2024 1406    CO2 28 02/13/2024 1406    BUN 14 02/13/2024 1406    CREATININE 0.89 02/13/2024 1406    Lab Results   Component Value Date/Time    CALCIUM 9.3 02/13/2024 1406    ALKPHOS 66 02/13/2024 1406    AST 22 02/13/2024 1406    ALT 14 02/13/2024 1406    BILITOT 0.3 02/13/2024 1406             Radiology:  XR DEXA bone density  Narrative: Interpreted By:  Jonathan Del Rosario,   STUDY:  DEXA BONE DENSITY2/22/2024 10:42 am      INDICATION:  Signs/Symptoms:pre anastrozole. The patient is a 67 y/o  year old F.      COMPARISON:  07/05/2013, 06/16/2010 (baseline).      ACCESSION NUMBER(S):  [id number]      ORDERING CLINICIAN:  GÉNESIS WOODS      TECHNIQUE:  DEXA BONE DENSITY      FINDINGS:  SPINE L1-L4  Bone Mineral Density: 1.066  T-Score -0.9  Z-Score  1.0  Bone Mineral Density change vs baseline:  -6.0%  Bone Mineral Density change vs previous: -4.1%      LEFT FEMUR -TOTAL  Bone Mineral Density: 0.758  T-Score -2.0   Z-Score  -0.4  Bone Mineral Density change vs baseline: -4.9%  Bone Mineral Density change vs previous: -1.6%      LEFT FEMUR -NECK  Bone Mineral Density: 0.724  T-Score -2.3  Z-Score -0.5      World Health Organization (WHO) criteria for post-menopausal,   Women:  Normal:         T-score at or above -1 SD  Osteopenia:   T-score between -1 and -2.5 SD  Osteoporosis: T-score at or below -2.5 SD          10-year Fracture Risk:  Major Osteoporotic Fracture  13.2  Hip Fracture                        2.7      Note:  If no FRAX score is reported, it is because:  Some T-score for Spine Total or Hip Total or Femoral Neck at or below  -2.5      This exam was performed at [facility] on a [facility] Dexa Unit.      Impression: DEXA:  According to World Health Organization criteria,  classification is low bone mass (osteopenia)      Followup recommended in two years or sooner as clinically warranted.      All images and detailed analysis are available on the  Radiology  PACS.      MACRO:  None      Signed by: Jonathan Del Rosario 2/22/2024 10:48 AM  Dictation workstation:   [id number]       BI mammo bilateral screening tomosynthesis 06/14/2023    Narrative  Interpreted By:  ENZO MANUEL MD  MRN: [id number]  Patient Name: HUNTER VELA    STUDY:  DIGITAL MAMM SCREENING W/ RUBI;  6/14/2023 8:00 am    ACCESSION NUMBER(S):  [id number]    ORDERING CLINICIAN:  KATIE HINOJOSA    INDICATION:  Screening.    COMPARISON:  09/08/2021, 07/17/2020, 01/17/2019    FINDINGS:  2D and tomosynthesis images were reviewed at 1 mm slice thickness.    There are areas of scattered fibroglandular tissue.  There are 2  adjacent irregular spiculated equal density masses in the upper outer  left breast at middle depth. No suspicious masses or  calcifications  are identified in the right breast.    Impression  No mammographic evidence of malignancy in the right breast. Left  breast mass x2.    BI-RADS CATEGORY:    Category: 0 - Incomplete; Need Additional Imaging Evaluation and/or  Prior Mammograms for Comparison.  Recommendation: Ultrasound Recommended.    For any future breast imaging appointments, please call [phone]).    Patient letter sent SADEVAL          Assessment/Plan:    68-year-old female previously healthy found to have on screening mammogram 2 adjacent irregular spiculated masses in the upper outer left breast at middle depth with  US showing a 5mm mass with left axilla showing 4 negative LN s/p  guided core needle  invasive lobular carcinoma, grade 2 with  ER 95%, DC 20%, HER2 positive 3+, also had lobular carcinoma in situ. Dr. Blanchard performed left partial mastectomy with sentinel  lymph node biopsy (0/1) 2 foci of cancer largest 1 measuring 13 mm, second 1 measuring 7 mm, margins were negative final stage PT1CN0. She is presenting to discuss adjuvant treatment options.      I reviewed with her the events that led to her diagnosis of breast cancer. We reviewed all the procedures and diagnostic imaging she underwent thus far. I discussed the features of her breast cancer that include the size, grade, lymph node status and  hormone receptor/ her2-ying status.     Based on APT trial recommended to proceed with THx12 weeks followed by radiation followed by hormonal therapy and herceptin to complete for a total of 1 year     Based on the updated 10 year results of the APT trial published in the lancet in March of this year:      410 patients were enrolled and 406 were given adjuvant paclitaxel and trastuzumab and included in the analysis. Mean age at enrolment was 55 years (SD 10·5), 405 (99·8%) of 406 patients were female and one (0·2%) was male, 350 (86·2%) were  White, 28 (6·9%) were Black or , and 272 (67·0%) had  hormone receptor-positive disease. After a median follow-up of 10·8 years (IQR 7·1-11·4), among 406 patients included in the analysis population, we observed 31  invasive disease-free survival events, of which six (19·4%) were locoregional ipsilateral recurrences, nine (29·0%) were new contralateral breast cancers, six (19·4%) were distant recurrences, and ten (32·3%) were all-cause deaths.  10-year invasive disease-free survival was 91·3% (95% CI 88·3-94·4), 10-year recurrence-free interval was 96·3% (95% CI 94·3-98·3), 10-year overall survival was 94·3% (95% CI 91·8-96·8), and 10-year breast  cancer-specific survival was 98·8% (95% CI 97·6-100)    on 01/02/2024: completed TH-     On 02/20/2024: completed radiation therapy    Plan:     - Plan for herceptin maintenance for a total of 1 year (to finish in oct 2024)- okay to treat today  -  started anastrozole Ca/vit D- does Have GI side effects will hold for 10 days   and will check in 10 days.    RTC in June 2024    At least 35 minutes of direct consultation was spent reviewing the patient's chart as well as discussing and  reviewing the  cancer care plan including educating and answering  questions and concerns, greater than 50 percent spent in counseling and coordination  of care.            Cony Amin MD  Hematology and Medical Oncology  [facility]

## 2024-04-09 ENCOUNTER — TELEPHONE (OUTPATIENT)
Dept: ADMISSION | Facility: HOSPITAL | Age: 69
End: 2024-04-09
Payer: MEDICARE

## 2024-04-09 NOTE — TELEPHONE ENCOUNTER
Patient states her cramping and diarrhea completely resolved after being off of Anastrozole for 2 weeks. She took one dose after two weeks and immediately had cramping and diarrhea again. She has discontinued completely. She is asking what she will be switched to and next steps

## 2024-04-10 DIAGNOSIS — Z17.0 MALIGNANT NEOPLASM OF UPPER-OUTER QUADRANT OF LEFT BREAST IN FEMALE, ESTROGEN RECEPTOR POSITIVE (MULTI): Primary | ICD-10-CM

## 2024-04-10 DIAGNOSIS — C50.412 MALIGNANT NEOPLASM OF UPPER-OUTER QUADRANT OF LEFT BREAST IN FEMALE, ESTROGEN RECEPTOR POSITIVE (MULTI): Primary | ICD-10-CM

## 2024-04-10 RX ORDER — EXEMESTANE 25 MG/1
25 TABLET ORAL DAILY
Qty: 30 TABLET | Refills: 3 | Status: SHIPPED | OUTPATIENT
Start: 2024-04-10 | End: 2025-04-10

## 2024-04-16 ENCOUNTER — INFUSION (OUTPATIENT)
Dept: HEMATOLOGY/ONCOLOGY | Facility: CLINIC | Age: 69
End: 2024-04-16
Payer: MEDICARE

## 2024-04-16 ENCOUNTER — HOSPITAL ENCOUNTER (OUTPATIENT)
Dept: RADIATION ONCOLOGY | Facility: CLINIC | Age: 69
Setting detail: RADIATION/ONCOLOGY SERIES
Discharge: HOME | End: 2024-04-16
Payer: MEDICARE

## 2024-04-16 ENCOUNTER — APPOINTMENT (OUTPATIENT)
Dept: HEMATOLOGY/ONCOLOGY | Facility: CLINIC | Age: 69
End: 2024-04-16
Payer: MEDICARE

## 2024-04-16 VITALS
SYSTOLIC BLOOD PRESSURE: 151 MMHG | BODY MASS INDEX: 25.01 KG/M2 | DIASTOLIC BLOOD PRESSURE: 82 MMHG | HEART RATE: 110 BPM | TEMPERATURE: 97.2 F | OXYGEN SATURATION: 97 % | WEIGHT: 125.88 LBS | RESPIRATION RATE: 18 BRPM

## 2024-04-16 DIAGNOSIS — Z17.0 MALIGNANT NEOPLASM OF UPPER-OUTER QUADRANT OF LEFT BREAST IN FEMALE, ESTROGEN RECEPTOR POSITIVE (MULTI): Primary | ICD-10-CM

## 2024-04-16 DIAGNOSIS — C50.412 MALIGNANT NEOPLASM OF UPPER-OUTER QUADRANT OF LEFT BREAST IN FEMALE, ESTROGEN RECEPTOR POSITIVE (MULTI): Primary | ICD-10-CM

## 2024-04-16 DIAGNOSIS — C50.912 INVASIVE LOBULAR CARCINOMA OF LEFT BREAST IN FEMALE (MULTI): ICD-10-CM

## 2024-04-16 PROCEDURE — 99214 OFFICE O/P EST MOD 30 MIN: CPT | Performed by: NURSE PRACTITIONER

## 2024-04-16 PROCEDURE — 2500000004 HC RX 250 GENERAL PHARMACY W/ HCPCS (ALT 636 FOR OP/ED): Performed by: STUDENT IN AN ORGANIZED HEALTH CARE EDUCATION/TRAINING PROGRAM

## 2024-04-16 PROCEDURE — 96413 CHEMO IV INFUSION 1 HR: CPT

## 2024-04-16 RX ORDER — EPINEPHRINE 0.3 MG/.3ML
0.3 INJECTION SUBCUTANEOUS EVERY 5 MIN PRN
Status: DISCONTINUED | OUTPATIENT
Start: 2024-04-16 | End: 2024-04-16 | Stop reason: HOSPADM

## 2024-04-16 RX ORDER — HEPARIN 100 UNIT/ML
500 SYRINGE INTRAVENOUS AS NEEDED
Status: DISCONTINUED | OUTPATIENT
Start: 2024-04-16 | End: 2024-04-16 | Stop reason: HOSPADM

## 2024-04-16 RX ORDER — ALBUTEROL SULFATE 0.83 MG/ML
3 SOLUTION RESPIRATORY (INHALATION) AS NEEDED
Status: DISCONTINUED | OUTPATIENT
Start: 2024-04-16 | End: 2024-04-16 | Stop reason: HOSPADM

## 2024-04-16 RX ORDER — HEPARIN SODIUM,PORCINE/PF 10 UNIT/ML
50 SYRINGE (ML) INTRAVENOUS AS NEEDED
Status: CANCELLED | OUTPATIENT
Start: 2024-04-16

## 2024-04-16 RX ORDER — FAMOTIDINE 10 MG/ML
20 INJECTION INTRAVENOUS ONCE AS NEEDED
Status: DISCONTINUED | OUTPATIENT
Start: 2024-04-16 | End: 2024-04-16 | Stop reason: HOSPADM

## 2024-04-16 RX ORDER — HEPARIN SODIUM,PORCINE/PF 10 UNIT/ML
50 SYRINGE (ML) INTRAVENOUS AS NEEDED
Status: DISCONTINUED | OUTPATIENT
Start: 2024-04-16 | End: 2024-04-16 | Stop reason: HOSPADM

## 2024-04-16 RX ORDER — PROCHLORPERAZINE EDISYLATE 5 MG/ML
10 INJECTION INTRAMUSCULAR; INTRAVENOUS EVERY 6 HOURS PRN
Status: DISCONTINUED | OUTPATIENT
Start: 2024-04-16 | End: 2024-04-16 | Stop reason: HOSPADM

## 2024-04-16 RX ORDER — PROCHLORPERAZINE MALEATE 10 MG
10 TABLET ORAL EVERY 6 HOURS PRN
Status: DISCONTINUED | OUTPATIENT
Start: 2024-04-16 | End: 2024-04-16 | Stop reason: HOSPADM

## 2024-04-16 RX ORDER — DIPHENHYDRAMINE HYDROCHLORIDE 50 MG/ML
50 INJECTION INTRAMUSCULAR; INTRAVENOUS AS NEEDED
Status: DISCONTINUED | OUTPATIENT
Start: 2024-04-16 | End: 2024-04-16 | Stop reason: HOSPADM

## 2024-04-16 RX ORDER — HEPARIN 100 UNIT/ML
500 SYRINGE INTRAVENOUS AS NEEDED
Status: CANCELLED | OUTPATIENT
Start: 2024-04-16

## 2024-04-16 RX ADMIN — TRASTUZUMAB-ANNS 333.9 MG: 420 INJECTION, POWDER, LYOPHILIZED, FOR SOLUTION INTRAVENOUS at 10:26

## 2024-04-16 RX ADMIN — HEPARIN 500 UNITS: 100 SYRINGE at 11:12

## 2024-04-16 ASSESSMENT — PAIN SCALES - GENERAL
PAINLEVEL: 0-NO PAIN
PAINLEVEL: 0-NO PAIN

## 2024-04-16 NOTE — PROGRESS NOTES
Radiation Oncology Follow-Up    Patient Name:  Zina Hernandez  MRN:  [id number]  :  1955    Referring Provider: No ref. provider found  Primary Care Provider: Baljinder Avendaño MD  Care Team: Patient Care Team:  Baljinder Avendaño MD as PCP - General (Family Medicine)  Cony Amin MD as Consulting Physician (Hematology and Oncology)    Date of Service: 2024     Diagnostic/Therapeutic History:  Cancer History:          Breast         AJCC Edition: 8th (AJCC), Diagnosis Date: 30-Aug-2023, IA, pT1c pN0 cM0 G2     Treatment Synopsis:    - On 2023 screening mammogram she had 2 adjacent irregular spiculated masses in the upper outer left breast at middle depth.  No suspicious masses or calcification  in the right breast.  BI-RADS Category 0     -On 2023 she had targeted ultrasound that showed at 1:00, 4 cm from the nipple an irregular hypoechoic mass measuring 3 x 5 x 4 mm this corresponds to the mammographic finding.  Left axilla showed 4 morphologically normal lymph nodes without lymphadenopathy      -On 2023 she had left breast mass ultrasound guided core needle  invasive lobular carcinoma, grade 2 with  ER 95%, MS 20%, HER2 positive 3+, also had lobular carcinoma in situ     -On 2023 Dr. Blanchard performed left partial mastectomy with sentinel lymph node biopsy () 2 foci of cancer largest 1 measuring 13 mm, second 1 measuring 7 mm, margins were negative final stage PT1CN0      -2024: completed      - 24 - 24: radiation therapy to left breast consisting of a dose of 42.56 Gy.     SUBJECTIVE  History of Present Illness:   Zina Hernandez is here today for routine radiation follow up/initial radiation survivorship visit.  She is here with her dtr today.  Doing well and reports left breast healing well and skin intact. She has started exemestane and no adverse effects.  Denies any swelling of left arm or difficulty with ROM.  No headaches, fever, chills, cough, SOB, chest  pain, N/V or bony pain. She continues Herceptin q 3 week infusions.     Review of Systems:    Review of Systems   All other systems reviewed and are negative.    Performance Status:   The Karnofsky performance scale today is 90, Able to carry on normal activity; minor signs or symptoms of disease (ECOG equivalent 0).      OBJECTIVE    Current Outpatient Medications:     exemestane (Aromasin) 25 mg tablet, Take 1 tablet (25 mg total) by mouth once daily.  Take after a meal.  Try to take at the same time each day., Disp: 30 tablet, Rfl: 3    loperamide (Imodium A-D) 1 mg/7.5 mL liquid, Take 15 mL (2 mg) by mouth., Disp: , Rfl:     dexAMETHasone 0.5 mg/5 mL oral liquid, Take 80 mL (8 mg) by mouth 2 times a day for 1 day., Disp: 160 mL, Rfl: 0    dexAMETHasone 0.5 mg/5 mL oral liquid, Take 40 mL (4 mg) by mouth 2 times a day for 20 days., Disp: 1600 mL, Rfl: 0  No current facility-administered medications for this encounter.     Physical Exam  Constitutional:       Appearance: Normal appearance.   HENT:      Nose: Nose normal.      Mouth/Throat:      Mouth: Mucous membranes are moist.      Pharynx: Oropharynx is clear.   Eyes:      Conjunctiva/sclera: Conjunctivae normal.      Pupils: Pupils are equal, round, and reactive to light.   Cardiovascular:      Heart sounds: Normal heart sounds.   Pulmonary:      Breath sounds: Normal breath sounds.   Chest:   Breasts:     Right: No swelling, inverted nipple, mass or nipple discharge.      Left: No swelling, inverted nipple, mass or nipple discharge.      Comments: Left breast with well healed surgical incision. Mild tanning in radiation field.  Skin intact.   Abdominal:      Palpations: Abdomen is soft.   Musculoskeletal:         General: No swelling. Normal range of motion.      Cervical back: Normal range of motion and neck supple.   Lymphadenopathy:      Cervical: No cervical adenopathy.      Upper Body:      Right upper body: No supraclavicular or axillary adenopathy.       Left upper body: No supraclavicular or axillary adenopathy.   Neurological:      General: No focal deficit present.      Mental Status: She is alert and oriented to person, place, and time.   Psychiatric:         Mood and Affect: Mood normal.         Behavior: Behavior normal.         ASSESSMENT/PLAN:  68 y.o. female with early stage triple positive left breast cancer s/p breast conserving surgery followed by adjuvant chemotherapy/Herceptin followed by radiation. Doing well.  Reviewed skin care, possible late effects of treatment, follow up plan. She will continue exemestane and adjuvant Herceptin and follow up with Dr. Amin.  Radiation follow up in 6 mo.  Mammogram and FUV with Dr. Blanchard. Radiation follow up in 6 mo.  Call with any concerns.     Layne Locke CNP  [phone]

## 2024-04-23 ENCOUNTER — APPOINTMENT (OUTPATIENT)
Dept: RADIATION ONCOLOGY | Facility: CLINIC | Age: 69
End: 2024-04-23
Payer: MEDICARE

## 2024-04-29 ENCOUNTER — OFFICE VISIT (OUTPATIENT)
Dept: CARDIOLOGY | Facility: CLINIC | Age: 69
End: 2024-04-29
Payer: MEDICARE

## 2024-04-29 VITALS
DIASTOLIC BLOOD PRESSURE: 78 MMHG | SYSTOLIC BLOOD PRESSURE: 145 MMHG | BODY MASS INDEX: 24.44 KG/M2 | RESPIRATION RATE: 16 BRPM | WEIGHT: 123.02 LBS | HEART RATE: 83 BPM | TEMPERATURE: 97.3 F | OXYGEN SATURATION: 93 %

## 2024-04-29 DIAGNOSIS — Z51.81 ENCOUNTER FOR MONITORING CARDIOTOXIC DRUG THERAPY: ICD-10-CM

## 2024-04-29 DIAGNOSIS — Z78.9 NEVER SMOKED ANY SUBSTANCE: ICD-10-CM

## 2024-04-29 DIAGNOSIS — Z79.899 ENCOUNTER FOR MONITORING CARDIOTOXIC DRUG THERAPY: ICD-10-CM

## 2024-04-29 DIAGNOSIS — R07.89 OTHER CHEST PAIN: ICD-10-CM

## 2024-04-29 DIAGNOSIS — Z92.3 HISTORY OF THORACIC IRRADIATION: ICD-10-CM

## 2024-04-29 DIAGNOSIS — R07.9 CHEST PAIN, UNSPECIFIED TYPE: ICD-10-CM

## 2024-04-29 DIAGNOSIS — R06.09 OTHER FORMS OF DYSPNEA: ICD-10-CM

## 2024-04-29 DIAGNOSIS — C50.912 INVASIVE LOBULAR CARCINOMA OF LEFT BREAST IN FEMALE (MULTI): Primary | ICD-10-CM

## 2024-04-29 PROBLEM — R79.89 ELEVATED BRAIN NATRIURETIC PEPTIDE (BNP) LEVEL: Status: ACTIVE | Noted: 2024-04-29

## 2024-04-29 PROBLEM — R79.89 ELEVATED BRAIN NATRIURETIC PEPTIDE (BNP) LEVEL: Status: RESOLVED | Noted: 2024-04-29 | Resolved: 2024-04-29

## 2024-04-29 PROCEDURE — 1036F TOBACCO NON-USER: CPT | Performed by: INTERNAL MEDICINE

## 2024-04-29 PROCEDURE — 99214 OFFICE O/P EST MOD 30 MIN: CPT | Performed by: INTERNAL MEDICINE

## 2024-04-29 PROCEDURE — 1126F AMNT PAIN NOTED NONE PRSNT: CPT | Performed by: INTERNAL MEDICINE

## 2024-04-29 PROCEDURE — 1160F RVW MEDS BY RX/DR IN RCRD: CPT | Performed by: INTERNAL MEDICINE

## 2024-04-29 PROCEDURE — 1159F MED LIST DOCD IN RCRD: CPT | Performed by: INTERNAL MEDICINE

## 2024-04-29 ASSESSMENT — ENCOUNTER SYMPTOMS
CONSTITUTIONAL NEGATIVE: 1
DIZZINESS: 0
PSYCHIATRIC NEGATIVE: 1
ARTHRALGIAS: 1
PALPITATIONS: 1
GASTROINTESTINAL NEGATIVE: 1

## 2024-04-29 ASSESSMENT — PAIN SCALES - GENERAL: PAINLEVEL: 0-NO PAIN

## 2024-04-29 NOTE — PROGRESS NOTES
Patient:  Zina Hernandez  YOB: 1955  MRN: [id number]       Chief Complaint/Active Symptoms:       Zina Hernandez is a 68 y.o. female who returns today for cardiac follow-up.    Patient here for 3 month follow-up. Is doing better. No further chest pain, no shortness of breath. Has occasional palpitations that predate her cancer diagnosis and described as a fluttering sensation that aborts with cough. No syncope or near syncope. No edema. No worries or concern.     Cancer Diagnosis: Left breast cancer, ER=, PR20%+, HER2+  Oncologist: Dr. Cony Amin  Treatment:  Paclitaxel and Herceptin, lumpectomy in August 2023.  To continue with Herceptin through October 2024 after radiation therapy. Now on exemestane. Still getting herceptin every 3 weeks  Radiation: 4256 cGY (16 of 16 fractions) to left chest and axilla.   Risk factors: Age    Echo 10/2023: EF 65-70%  BNP 1/15/24: 9  Troponin 3/16/22: <0.02  LDL 4/25/23: 67    Review of Systems   Constitutional: Negative.    Cardiovascular:  Positive for palpitations. Negative for chest pain and leg swelling.   Gastrointestinal: Negative.    Genitourinary: Negative.    Musculoskeletal:  Positive for arthralgias.   Neurological:  Negative for dizziness.   Psychiatric/Behavioral: Negative.     All other systems reviewed and are negative.      Objective:     Vitals:    04/29/24 1313   BP: 145/78   Pulse: 83   Resp: 16   Temp: 36.3 °C (97.3 °F)   SpO2: 93%         Allergies:     Allergies   Allergen Reactions    Paclitaxel Other     Back pain and chest pain.     Tamiflu [Oseltamivir] Unknown          Medications:     Current Outpatient Medications   Medication Instructions    dexAMETHasone 8 mg, oral, 2 times daily    dexAMETHasone 4 mg, oral, 2 times daily    exemestane (AROMASIN) 25 mg, oral, Daily, Take after a meal.  Try to take at the same time each day.    loperamide (IMODIUM A-D) 2 mg, oral       Physical Examination:   GENERAL:  Well developed, well  "nourished, in no acute distress.  HEENT: NC AT, EOMI with anicteric sclera  NECK:  Supple, no JVD, no bruit.  LUNGS:  Clear to auscultation bilaterally, normal respiratory effort.  HEART:  PMI is nondisplaced. RRR with normal S1 and S2, no S3, no mumur or rub. No carotid or abdominal bruits  ABDOMEN: Soft, NT, ND without palpable organomegaly or bruits  EXTREMITIES:  Warm with good color, no clubbing or cyanosis.  There is no edema noted.  PERIPHERAL VASCULAR:  Pulses present and equally palpable; 2+ throughout.  MUSCULOSKELETAL: Mild osteoarthritic changes  NEURO/PSYCH:  Alert and oriented times three with approppriate behavior and responses. Nonfocal motor examination with normal gait and ambulation  Lymph: No significant palpable lymphadenopathy  Skin: no rash or lesions on exposed skin or reported.    Lab:     CBC:   Lab Results   Component Value Date    WBC 4.8 02/13/2024    RBC 3.92 (L) 02/13/2024    HGB 11.6 (L) 02/13/2024    HCT 36.3 02/13/2024     02/13/2024        CMP:    Lab Results   Component Value Date     02/13/2024    K 3.7 02/13/2024     02/13/2024    CO2 28 02/13/2024    BUN 14 02/13/2024    CREATININE 0.89 02/13/2024    GLUCOSE 101 (H) 02/13/2024    CALCIUM 9.3 02/13/2024       Magnesium:    Lab Results   Component Value Date    MG 2.00 03/16/2022       Lipid Profile:    Lab Results   Component Value Date    TRIG 135 04/25/2023    HDL 67.6 04/25/2023       TSH:    Lab Results   Component Value Date    TSH 4.11 (H) 04/25/2023       BNP:   Lab Results   Component Value Date    BNP 9 01/15/2024        PT/INR:    Lab Results   Component Value Date    PROTIME 11.7 10/11/2023    INR 1.0 10/11/2023       HgBA1c:    No results found for: \"HGBA1C\"    BMP:  Lab Results   Component Value Date     02/13/2024     01/02/2024     12/26/2023    K 3.7 02/13/2024    K 3.7 01/02/2024    K 3.8 12/26/2023     02/13/2024     01/02/2024     12/26/2023    CO2 28 " 02/13/2024    CO2 26 01/02/2024    CO2 27 12/26/2023    BUN 14 02/13/2024    BUN 23 01/02/2024    BUN 20 12/26/2023    CREATININE 0.89 02/13/2024    CREATININE 0.84 01/02/2024    CREATININE 0.84 12/26/2023       Cardiac Enzymes:    Lab Results   Component Value Date    TROPHS <3 01/15/2024       Hepatic Function Panel:    Lab Results   Component Value Date    ALKPHOS 66 02/13/2024    ALT 14 02/13/2024    AST 22 02/13/2024    PROT 6.8 02/13/2024    BILITOT 0.3 02/13/2024         Diagnostic Studies:     No echocardiogram results found for the past 12 months  Last echocardiogram in October 2023 showed normal LV function  No nuclear medicine results found for the past 12 months    No valid procedures specified.    EKG:   No recent EKG results    Radiology:     No orders to display     Last imaging reviewed    ASSESSMENT     Problem List Items Addressed This Visit       Invasive lobular carcinoma of left breast in female (Multi) - Primary    Relevant Orders    Follow Up In Cardiology    Encounter for monitoring cardiotoxic drug therapy    Relevant Orders    Onco-Echo Limited (Strain And 3D)    Follow Up In Cardiology    Troponin I, High Sensitivity    B-Type Natriuretic Peptide    RESOLVED: Other chest pain    Never smoked any substance    History of thoracic irradiation    Relevant Orders    B-Type Natriuretic Peptide     Other Visit Diagnoses       Chest pain, unspecified type        Relevant Orders    Onco-Echo Limited (Strain And 3D)    B-Type Natriuretic Peptide    Other forms of dyspnea        Relevant Orders    B-Type Natriuretic Peptide            PLAN     1.  Chest pain.  Patient has had no further episodes of chest discomfort.  Although for some types of positions and think she has noticed some changes in breathing overall there is been no orthopnea or PND or significant shortness of breath.  Given this we will continue to follow her clinically.  2.  Left breast cancer status post chemotherapy now on Herceptin  through October of this year.  Encounter for monitoring cardiotoxic chemotherapy.  Will check her troponin and BN peptide once more and then we will do a washout echocardiogram after her last treatment in October.  If that shows normal LV function and she has no symptoms we would then see her intermittently to follow-up her chest radiation for any cardiovascular complications.  There were no acute cardiovascular complications of her left chest radiation therapy.  3.  Tobacco weight status.  The patient is a current non-smoker and at her ideal body weight.    Will see her back in the fall after her final echocardiogram and we will see her 2 years post completion of radiation therapy with a CT cardiac score and every 5 years thereafter.  We reviewed the signs and symptoms of heart failure and angina and when to seek medical attention.

## 2024-05-07 ENCOUNTER — INFUSION (OUTPATIENT)
Dept: HEMATOLOGY/ONCOLOGY | Facility: CLINIC | Age: 69
End: 2024-05-07
Payer: MEDICARE

## 2024-05-07 VITALS
RESPIRATION RATE: 18 BRPM | WEIGHT: 128.75 LBS | TEMPERATURE: 96.6 F | OXYGEN SATURATION: 94 % | DIASTOLIC BLOOD PRESSURE: 83 MMHG | HEART RATE: 90 BPM | SYSTOLIC BLOOD PRESSURE: 155 MMHG | BODY MASS INDEX: 25.58 KG/M2

## 2024-05-07 DIAGNOSIS — Z92.3 HISTORY OF THORACIC IRRADIATION: ICD-10-CM

## 2024-05-07 DIAGNOSIS — Z79.899 ENCOUNTER FOR MONITORING CARDIOTOXIC DRUG THERAPY: ICD-10-CM

## 2024-05-07 DIAGNOSIS — C50.912 INVASIVE LOBULAR CARCINOMA OF LEFT BREAST IN FEMALE (MULTI): ICD-10-CM

## 2024-05-07 DIAGNOSIS — R07.9 CHEST PAIN, UNSPECIFIED TYPE: ICD-10-CM

## 2024-05-07 DIAGNOSIS — R06.09 OTHER FORMS OF DYSPNEA: ICD-10-CM

## 2024-05-07 DIAGNOSIS — Z51.81 ENCOUNTER FOR MONITORING CARDIOTOXIC DRUG THERAPY: ICD-10-CM

## 2024-05-07 LAB
BNP SERPL-MCNC: 7 PG/ML (ref 0–99)
CARDIAC TROPONIN I PNL SERPL HS: <3 NG/L (ref 0–34)
HBV SURFACE AB SER-ACNC: <3.1 MIU/ML

## 2024-05-07 PROCEDURE — 2500000004 HC RX 250 GENERAL PHARMACY W/ HCPCS (ALT 636 FOR OP/ED): Performed by: STUDENT IN AN ORGANIZED HEALTH CARE EDUCATION/TRAINING PROGRAM

## 2024-05-07 PROCEDURE — 86706 HEP B SURFACE ANTIBODY: CPT

## 2024-05-07 PROCEDURE — 84484 ASSAY OF TROPONIN QUANT: CPT

## 2024-05-07 PROCEDURE — 2500000004 HC RX 250 GENERAL PHARMACY W/ HCPCS (ALT 636 FOR OP/ED): Mod: JG | Performed by: STUDENT IN AN ORGANIZED HEALTH CARE EDUCATION/TRAINING PROGRAM

## 2024-05-07 PROCEDURE — 83880 ASSAY OF NATRIURETIC PEPTIDE: CPT

## 2024-05-07 PROCEDURE — 96413 CHEMO IV INFUSION 1 HR: CPT

## 2024-05-07 RX ORDER — PROCHLORPERAZINE MALEATE 10 MG
10 TABLET ORAL EVERY 6 HOURS PRN
Status: DISCONTINUED | OUTPATIENT
Start: 2024-05-07 | End: 2024-05-07 | Stop reason: HOSPADM

## 2024-05-07 RX ORDER — HEPARIN SODIUM,PORCINE/PF 10 UNIT/ML
50 SYRINGE (ML) INTRAVENOUS AS NEEDED
OUTPATIENT
Start: 2024-05-07

## 2024-05-07 RX ORDER — HEPARIN 100 UNIT/ML
500 SYRINGE INTRAVENOUS AS NEEDED
Status: DISCONTINUED | OUTPATIENT
Start: 2024-05-07 | End: 2024-05-07 | Stop reason: HOSPADM

## 2024-05-07 RX ORDER — HEPARIN 100 UNIT/ML
500 SYRINGE INTRAVENOUS AS NEEDED
OUTPATIENT
Start: 2024-05-07

## 2024-05-07 RX ORDER — EPINEPHRINE 0.3 MG/.3ML
0.3 INJECTION SUBCUTANEOUS EVERY 5 MIN PRN
Status: DISCONTINUED | OUTPATIENT
Start: 2024-05-07 | End: 2024-05-07 | Stop reason: HOSPADM

## 2024-05-07 RX ORDER — PROCHLORPERAZINE EDISYLATE 5 MG/ML
10 INJECTION INTRAMUSCULAR; INTRAVENOUS EVERY 6 HOURS PRN
Status: DISCONTINUED | OUTPATIENT
Start: 2024-05-07 | End: 2024-05-07 | Stop reason: HOSPADM

## 2024-05-07 RX ORDER — ALBUTEROL SULFATE 0.83 MG/ML
3 SOLUTION RESPIRATORY (INHALATION) AS NEEDED
Status: DISCONTINUED | OUTPATIENT
Start: 2024-05-07 | End: 2024-05-07 | Stop reason: HOSPADM

## 2024-05-07 RX ORDER — DIPHENHYDRAMINE HYDROCHLORIDE 50 MG/ML
50 INJECTION INTRAMUSCULAR; INTRAVENOUS AS NEEDED
Status: DISCONTINUED | OUTPATIENT
Start: 2024-05-07 | End: 2024-05-07 | Stop reason: HOSPADM

## 2024-05-07 RX ORDER — FAMOTIDINE 10 MG/ML
20 INJECTION INTRAVENOUS ONCE AS NEEDED
Status: DISCONTINUED | OUTPATIENT
Start: 2024-05-07 | End: 2024-05-07 | Stop reason: HOSPADM

## 2024-05-07 RX ADMIN — TRASTUZUMAB-ANNS 333.9 MG: 150 INJECTION, POWDER, LYOPHILIZED, FOR SOLUTION INTRAVENOUS at 10:09

## 2024-05-07 RX ADMIN — HEPARIN 500 UNITS: 100 SYRINGE at 11:01

## 2024-05-07 ASSESSMENT — PAIN SCALES - GENERAL: PAINLEVEL: 0-NO PAIN

## 2024-05-07 NOTE — SIGNIFICANT EVENT

## 2024-05-15 ENCOUNTER — TELEPHONE (OUTPATIENT)
Dept: HEMATOLOGY/ONCOLOGY | Facility: HOSPITAL | Age: 69
End: 2024-05-15
Payer: MEDICARE

## 2024-05-15 DIAGNOSIS — Z51.81 ENCOUNTER FOR MONITORING CARDIOTOXIC DRUG THERAPY: ICD-10-CM

## 2024-05-15 DIAGNOSIS — Z17.0 MALIGNANT NEOPLASM OF UPPER-OUTER QUADRANT OF LEFT BREAST IN FEMALE, ESTROGEN RECEPTOR POSITIVE (MULTI): Primary | ICD-10-CM

## 2024-05-15 DIAGNOSIS — C50.412 MALIGNANT NEOPLASM OF UPPER-OUTER QUADRANT OF LEFT BREAST IN FEMALE, ESTROGEN RECEPTOR POSITIVE (MULTI): Primary | ICD-10-CM

## 2024-05-15 DIAGNOSIS — Z79.899 ENCOUNTER FOR MONITORING CARDIOTOXIC DRUG THERAPY: ICD-10-CM

## 2024-05-15 NOTE — TELEPHONE ENCOUNTER
Insurance policy requires an echo within last 3 months in order for any future treatments to be approved. Call [phone] with results when complete to ensure results have not dropped.

## 2024-05-16 ENCOUNTER — TELEPHONE (OUTPATIENT)
Dept: ADMISSION | Facility: HOSPITAL | Age: 69
End: 2024-05-16
Payer: MEDICARE

## 2024-05-16 NOTE — TELEPHONE ENCOUNTER
Patient called and states she received a message through [employer] to schedule Onco-Echo from Dr. Martinez. Per previous note - insurance policy is requiring and echo for future treatments.  Patient frustrated with insurance company for making her do this again. Patient states she will follow up with insurance company prior to scheduling this test.  Will call back with additional issues/concerns.

## 2024-05-16 NOTE — TELEPHONE ENCOUNTER
Pt called with additional questions about why echo is needed.  I spoke to [facility] clinical review pharmacist and pt's plan requires echo to monitor LV function to ensure it is maintained within a certain range in order to approve future [facility] treatments.    Pt updated and will proceed with the echo.

## 2024-05-17 ENCOUNTER — TELEPHONE (OUTPATIENT)
Dept: HEMATOLOGY/ONCOLOGY | Facility: HOSPITAL | Age: 69
End: 2024-05-17

## 2024-05-17 DIAGNOSIS — C50.912 INVASIVE LOBULAR CARCINOMA OF LEFT BREAST IN FEMALE (MULTI): Primary | ICD-10-CM

## 2024-05-17 DIAGNOSIS — Z51.81 ENCOUNTER FOR MONITORING CARDIOTOXIC DRUG THERAPY: ICD-10-CM

## 2024-05-17 DIAGNOSIS — Z79.899 ENCOUNTER FOR MONITORING CARDIOTOXIC DRUG THERAPY: ICD-10-CM

## 2024-05-20 ENCOUNTER — HOSPITAL ENCOUNTER (OUTPATIENT)
Dept: CARDIOLOGY | Facility: HOSPITAL | Age: 69
Discharge: HOME | End: 2024-05-20
Payer: MEDICARE

## 2024-05-20 DIAGNOSIS — C50.412 MALIGNANT NEOPLASM OF UPPER-OUTER QUADRANT OF LEFT BREAST IN FEMALE, ESTROGEN RECEPTOR POSITIVE (MULTI): ICD-10-CM

## 2024-05-20 DIAGNOSIS — Z51.81 ENCOUNTER FOR MONITORING CARDIOTOXIC DRUG THERAPY: ICD-10-CM

## 2024-05-20 DIAGNOSIS — Z17.0 MALIGNANT NEOPLASM OF UPPER-OUTER QUADRANT OF LEFT BREAST IN FEMALE, ESTROGEN RECEPTOR POSITIVE (MULTI): ICD-10-CM

## 2024-05-20 DIAGNOSIS — Z79.899 ENCOUNTER FOR MONITORING CARDIOTOXIC DRUG THERAPY: ICD-10-CM

## 2024-05-20 LAB
AORTIC VALVE PEAK VELOCITY: 1.17 M/S
AV PEAK GRADIENT: 5.5 MMHG
AVA (PEAK VEL): 1.99 CM2
EJECTION FRACTION APICAL 4 CHAMBER: 56.5
LEFT ATRIUM VOLUME AREA LENGTH INDEX BSA: 22.1 ML/M2
LEFT VENTRICLE INTERNAL DIMENSION DIASTOLE: 3.96 CM (ref 3.5–6)
LEFT VENTRICULAR OUTFLOW TRACT DIAMETER: 1.82 CM
MITRAL VALVE E/A RATIO: 1.13
MITRAL VALVE E/E' RATIO: 7.05
RIGHT VENTRICLE FREE WALL PEAK S': 13 CM/S
RIGHT VENTRICLE PEAK SYSTOLIC PRESSURE: 17.8 MMHG
TRICUSPID ANNULAR PLANE SYSTOLIC EXCURSION: 1.9 CM

## 2024-05-20 PROCEDURE — 76376 3D RENDER W/INTRP POSTPROCES: CPT | Performed by: INTERNAL MEDICINE

## 2024-05-20 PROCEDURE — 93306 TTE W/DOPPLER COMPLETE: CPT | Performed by: INTERNAL MEDICINE

## 2024-05-20 PROCEDURE — 93356 MYOCRD STRAIN IMG SPCKL TRCK: CPT | Performed by: INTERNAL MEDICINE

## 2024-05-20 PROCEDURE — 76376 3D RENDER W/INTRP POSTPROCES: CPT

## 2024-05-24 ENCOUNTER — TELEPHONE (OUTPATIENT)
Dept: ADMISSION | Facility: HOSPITAL | Age: 69
End: 2024-05-24
Payer: MEDICARE

## 2024-05-24 ENCOUNTER — TELEPHONE (OUTPATIENT)
Dept: HEMATOLOGY/ONCOLOGY | Facility: CLINIC | Age: 69
End: 2024-05-24
Payer: MEDICARE

## 2024-05-24 ENCOUNTER — TELEPHONE (OUTPATIENT)
Dept: PRIMARY CARE | Facility: CLINIC | Age: 69
End: 2024-05-24
Payer: MEDICARE

## 2024-05-24 DIAGNOSIS — I11.9 BENIGN HYPERTENSIVE HEART DISEASE WITHOUT CONGESTIVE HEART FAILURE: Primary | ICD-10-CM

## 2024-05-24 DIAGNOSIS — Z51.81 ENCOUNTER FOR MONITORING CARDIOTOXIC DRUG THERAPY: ICD-10-CM

## 2024-05-24 DIAGNOSIS — Z79.899 ENCOUNTER FOR MONITORING CARDIOTOXIC DRUG THERAPY: ICD-10-CM

## 2024-05-24 NOTE — TELEPHONE ENCOUNTER
Echo reviewed show 12% decrease in EF  Holding off herceptin  Pt mostly asymptomatic  Reached out to Onc cards team who will schedule a fup

## 2024-05-24 NOTE — TELEPHONE ENCOUNTER
Zina is asking to speak with Dr. Amin about her plan of care. She states her EF has dropped and she is scheduled for her next infusion on Tuesday. She does not wish to proceed with treatment; asking if this appointment should be cancelled.

## 2024-05-24 NOTE — TELEPHONE ENCOUNTER
Pt left vm requesting phone call back regarding echo result. She said  and herself are concerned.

## 2024-05-28 ENCOUNTER — APPOINTMENT (OUTPATIENT)
Dept: HEMATOLOGY/ONCOLOGY | Facility: CLINIC | Age: 69
End: 2024-05-28
Payer: MEDICARE

## 2024-05-28 DIAGNOSIS — Z17.0 ESTROGEN RECEPTOR POSITIVE STATUS (ER+): ICD-10-CM

## 2024-05-28 DIAGNOSIS — Z51.81 ENCOUNTER FOR MONITORING CARDIOTOXIC DRUG THERAPY: Primary | ICD-10-CM

## 2024-05-28 DIAGNOSIS — E03.9 HYPOTHYROIDISM, UNSPECIFIED: Primary | ICD-10-CM

## 2024-05-28 DIAGNOSIS — Z79.899 ENCOUNTER FOR MONITORING CARDIOTOXIC DRUG THERAPY: Primary | ICD-10-CM

## 2024-05-28 DIAGNOSIS — C50.412 MALIGNANT NEOPLASM OF UPPER-OUTER QUADRANT OF LEFT FEMALE BREAST (MULTI): ICD-10-CM

## 2024-05-28 DIAGNOSIS — Z92.3 PERSONAL HISTORY OF IRRADIATION: ICD-10-CM

## 2024-05-28 RX ORDER — LOSARTAN POTASSIUM 25 MG/1
25 TABLET ORAL DAILY
Qty: 90 TABLET | Refills: 3 | Status: SHIPPED | OUTPATIENT
Start: 2024-05-28 | End: 2025-05-28

## 2024-05-28 NOTE — PROGRESS NOTES
"Spoke with patient by phone regarding echo results. Had started the discussion on Friday 5/24 when we became disconnected due to power loss at the office.     Today reviewed what we had already discussed on Friday and discusssed starting \"cardio-protective\" medical regimen which is basically traditional CHF medications. Discussed patients concern about starting additional medications and her feelings that she is very sensitive to meds. Reviewed plans to add a beta blocker depending on HR/BP and her symptoms in 2 weeks.     After discussion patient agreed to start low dose losartan and to see me in the office in the next 2 weeks.   "

## 2024-05-29 ENCOUNTER — LAB (OUTPATIENT)
Dept: LAB | Facility: LAB | Age: 69
End: 2024-05-29
Payer: MEDICARE

## 2024-05-29 DIAGNOSIS — Z17.0 ESTROGEN RECEPTOR POSITIVE STATUS (ER+): ICD-10-CM

## 2024-05-29 DIAGNOSIS — C50.412 MALIGNANT NEOPLASM OF UPPER-OUTER QUADRANT OF LEFT FEMALE BREAST (MULTI): ICD-10-CM

## 2024-05-29 DIAGNOSIS — Z92.3 PERSONAL HISTORY OF IRRADIATION: ICD-10-CM

## 2024-05-29 DIAGNOSIS — Z79.899 ENCOUNTER FOR MONITORING CARDIOTOXIC DRUG THERAPY: ICD-10-CM

## 2024-05-29 DIAGNOSIS — E03.9 HYPOTHYROIDISM, UNSPECIFIED: ICD-10-CM

## 2024-05-29 DIAGNOSIS — Z51.81 ENCOUNTER FOR MONITORING CARDIOTOXIC DRUG THERAPY: ICD-10-CM

## 2024-05-29 LAB
ALBUMIN SERPL BCP-MCNC: 4.2 G/DL (ref 3.4–5)
ALP SERPL-CCNC: 67 U/L (ref 33–136)
ALT SERPL W P-5'-P-CCNC: 18 U/L (ref 7–45)
ANION GAP SERPL CALC-SCNC: 10 MMOL/L (ref 10–20)
AST SERPL W P-5'-P-CCNC: 23 U/L (ref 9–39)
BASOPHILS # BLD AUTO: 0.03 X10*3/UL (ref 0–0.1)
BASOPHILS NFR BLD AUTO: 0.8 %
BILIRUB SERPL-MCNC: 0.7 MG/DL (ref 0–1.2)
BUN SERPL-MCNC: 16 MG/DL (ref 6–23)
CALCIUM SERPL-MCNC: 9.6 MG/DL (ref 8.6–10.3)
CARDIAC TROPONIN I PNL SERPL HS: <3 NG/L (ref 0–13)
CHLORIDE SERPL-SCNC: 104 MMOL/L (ref 98–107)
CHOLEST SERPL-MCNC: 166 MG/DL (ref 0–199)
CHOLESTEROL/HDL RATIO: 2.5
CO2 SERPL-SCNC: 31 MMOL/L (ref 21–32)
CREAT SERPL-MCNC: 1.12 MG/DL (ref 0.5–1.05)
EGFRCR SERPLBLD CKD-EPI 2021: 54 ML/MIN/1.73M*2
EOSINOPHIL # BLD AUTO: 0.24 X10*3/UL (ref 0–0.7)
EOSINOPHIL NFR BLD AUTO: 6.2 %
ERYTHROCYTE [DISTWIDTH] IN BLOOD BY AUTOMATED COUNT: 14.2 % (ref 11.5–14.5)
GLUCOSE SERPL-MCNC: 89 MG/DL (ref 74–99)
HCT VFR BLD AUTO: 39.1 % (ref 36–46)
HDLC SERPL-MCNC: 66.3 MG/DL
HGB BLD-MCNC: 12.6 G/DL (ref 12–16)
IMM GRANULOCYTES # BLD AUTO: 0.01 X10*3/UL (ref 0–0.7)
IMM GRANULOCYTES NFR BLD AUTO: 0.3 % (ref 0–0.9)
LDLC SERPL CALC-MCNC: 80 MG/DL
LYMPHOCYTES # BLD AUTO: 1.28 X10*3/UL (ref 1.2–4.8)
LYMPHOCYTES NFR BLD AUTO: 33.1 %
MCH RBC QN AUTO: 28.1 PG (ref 26–34)
MCHC RBC AUTO-ENTMCNC: 32.2 G/DL (ref 32–36)
MCV RBC AUTO: 87 FL (ref 80–100)
MONOCYTES # BLD AUTO: 0.47 X10*3/UL (ref 0.1–1)
MONOCYTES NFR BLD AUTO: 12.1 %
NEUTROPHILS # BLD AUTO: 1.84 X10*3/UL (ref 1.2–7.7)
NEUTROPHILS NFR BLD AUTO: 47.5 %
NON HDL CHOLESTEROL: 100 MG/DL (ref 0–149)
NRBC BLD-RTO: 0 /100 WBCS (ref 0–0)
PLATELET # BLD AUTO: 210 X10*3/UL (ref 150–450)
POTASSIUM SERPL-SCNC: 4.2 MMOL/L (ref 3.5–5.3)
PROT SERPL-MCNC: 7 G/DL (ref 6.4–8.2)
RBC # BLD AUTO: 4.48 X10*6/UL (ref 4–5.2)
SODIUM SERPL-SCNC: 141 MMOL/L (ref 136–145)
TRIGL SERPL-MCNC: 98 MG/DL (ref 0–149)
TSH SERPL-ACNC: 5.84 MIU/L (ref 0.44–3.98)
VLDL: 20 MG/DL (ref 0–40)
WBC # BLD AUTO: 3.9 X10*3/UL (ref 4.4–11.3)

## 2024-05-29 PROCEDURE — 84443 ASSAY THYROID STIM HORMONE: CPT

## 2024-05-29 PROCEDURE — 85025 COMPLETE CBC W/AUTO DIFF WBC: CPT

## 2024-05-29 PROCEDURE — 80061 LIPID PANEL: CPT

## 2024-05-29 PROCEDURE — 84484 ASSAY OF TROPONIN QUANT: CPT

## 2024-05-29 PROCEDURE — 80053 COMPREHEN METABOLIC PANEL: CPT

## 2024-05-30 DIAGNOSIS — Z17.0 MALIGNANT NEOPLASM OF UPPER-OUTER QUADRANT OF LEFT BREAST IN FEMALE, ESTROGEN RECEPTOR POSITIVE (MULTI): ICD-10-CM

## 2024-05-30 DIAGNOSIS — C50.412 MALIGNANT NEOPLASM OF UPPER-OUTER QUADRANT OF LEFT BREAST IN FEMALE, ESTROGEN RECEPTOR POSITIVE (MULTI): ICD-10-CM

## 2024-06-04 ENCOUNTER — TELEMEDICINE (OUTPATIENT)
Dept: HEMATOLOGY/ONCOLOGY | Facility: CLINIC | Age: 69
End: 2024-06-04
Payer: MEDICARE

## 2024-06-04 DIAGNOSIS — C50.412 MALIGNANT NEOPLASM OF UPPER-OUTER QUADRANT OF LEFT BREAST IN FEMALE, ESTROGEN RECEPTOR POSITIVE (MULTI): ICD-10-CM

## 2024-06-04 DIAGNOSIS — Z17.0 MALIGNANT NEOPLASM OF UPPER-OUTER QUADRANT OF LEFT BREAST IN FEMALE, ESTROGEN RECEPTOR POSITIVE (MULTI): ICD-10-CM

## 2024-06-04 DIAGNOSIS — R06.09 OTHER FORMS OF DYSPNEA: ICD-10-CM

## 2024-06-04 PROCEDURE — 99214 OFFICE O/P EST MOD 30 MIN: CPT | Performed by: STUDENT IN AN ORGANIZED HEALTH CARE EDUCATION/TRAINING PROGRAM

## 2024-06-04 PROCEDURE — 99214 OFFICE O/P EST MOD 30 MIN: CPT | Mod: 95 | Performed by: STUDENT IN AN ORGANIZED HEALTH CARE EDUCATION/TRAINING PROGRAM

## 2024-06-04 NOTE — PROGRESS NOTES
Patient ID: Zina Hernandez is a 68 y.o. female.    The patient presents to clinic today for her history of breast cancer.     Cancer Staging   No matching staging information was found for the patient.        Diagnostic/Therapeutic History:    The patient presents to clinic today for her history of breast cancer.     Diagnostic/Therapeutic History:  Cancer History:          Breast         AJCC Edition: 8th (AJCC), Diagnosis Date: 30-Aug-2023, IA, pT1c pN0 cM0 G2     Treatment Synopsis:    - On 6/14/2023 screening mammogram she had 2 adjacent irregular spiculated masses in the upper outer left breast at middle depth.  No suspicious masses or calcification  in the right breast.  BI-RADS Category 0     -On 6/28/2023 she had targeted ultrasound that showed at 1:00, 4 cm from the nipple an irregular hypoechoic mass measuring 3 x 5 x 4 mm this corresponds to the mammographic finding.  Left axilla showed 4 morphologically normal lymph nodes without lymphadenopathy      -On 7/14/2023 she had left breast mass ultrasound guided core needle  invasive lobular carcinoma, grade 2 with  ER 95%, DE 20%, HER2 positive 3+, also had lobular carcinoma in situ     -On 8/30/2023 Dr. Blanchard performed left partial mastectomy with sentinel lymph node biopsy (0/1) 2 foci of cancer largest 1 measuring 13 mm, second 1 measuring 7 mm, margins were negative final stage [id number]     -on 01/02/2024: completed TH    - On 02/20/2024: completed radiation therapy      History of Present Illness (HPI)/Interval History:    Virtual visit:   Echo showed 12% Decrease In EF  Holding herceptin for now and has fup with Dr Osuna   No CP, or SOB      Started anastrozole  on Ca/vit D   She has diarrhea on a daily basis as well as intermittent cramping  No nausea, no vomiting  No fever, no chills  Neuropathy is no longer there           14 point review of system otherwise unremarkable     PMH: as above     Meds: allergies reviewed      Reproductive  history: Menarche at 13, AFLB: 27,  menopause at 50, no HRT     Family history maternal grandmother with uterine cancer in her 60s    She denies any fevers or chills.      Review of Systems:  14-point ROS otherwise negative, as per HPI.    Past Medical History:   Diagnosis Date    Cancer (Multi)     Migraine     Other chest pain 01/15/2024       Past Surgical History:   Procedure Laterality Date    BREAST LUMPECTOMY         Social History     Socioeconomic History    Marital status:      Spouse name: Not on file    Number of children: Not on file    Years of education: Not on file    Highest education level: Not on file   Occupational History    Not on file   Tobacco Use    Smoking status: Never    Smokeless tobacco: Never   Substance and Sexual Activity    Alcohol use: Never    Drug use: Never    Sexual activity: Not on file   Other Topics Concern    Not on file   Social History Narrative    Not on file     Social Determinants of Health     Financial Resource Strain: Not on file   Food Insecurity: Not on file   Transportation Needs: Not on file   Physical Activity: Not on file   Stress: Not on file   Social Connections: Not on file   Intimate Partner Violence: Not on file   Housing Stability: Not on file       Allergies   Allergen Reactions    Paclitaxel Other     Back pain and chest pain.     Tamiflu [Oseltamivir] Unknown         Current Outpatient Medications:     dexAMETHasone 0.5 mg/5 mL oral liquid, Take 80 mL (8 mg) by mouth 2 times a day for 1 day., Disp: 160 mL, Rfl: 0    dexAMETHasone 0.5 mg/5 mL oral liquid, Take 40 mL (4 mg) by mouth 2 times a day for 20 days., Disp: 1600 mL, Rfl: 0    exemestane (Aromasin) 25 mg tablet, Take 1 tablet (25 mg total) by mouth once daily.  Take after a meal.  Try to take at the same time each day., Disp: 30 tablet, Rfl: 3    loperamide (Imodium A-D) 1 mg/7.5 mL liquid, Take 15 mL (2 mg) by mouth., Disp: , Rfl:     losartan (Cozaar) 25 mg tablet, Take 1 tablet (25  mg) by mouth once daily., Disp: 90 tablet, Rfl: 3     Objective    BSA: There is no height or weight on file to calculate BSA.  There were no vitals taken for this visit.    ECOG Performance Status: 0    Physical Exam    Constitutional: Well developed, awake/alert/oriented  x3, no distress, alert and cooperative   Eyes: PERRL, EOMI, clear sclera   ENMT: mucous membranes moist, no apparent injury,  no lesions seen   Head/Neck: Neck supple, no apparent injury, thyroid  without mass or tenderness, No JVD, trachea midline, no bruits   Respiratory/Thorax: Patent airways, CTAB, normal  breath sounds with good chest expansion, thorax symmetric   Cardiovascular: Regular, rate and rhythm, no murmurs,  2+ equal pulses of the extremities, normal S 1and S 2   Gastrointestinal: Nondistended, soft, non-tender,  no rebound tenderness or guarding, no masses palpable, no organomegaly, +BS, no bruits   Extremities: normal extremities, no cyanosis edema,  contusions or wounds, no clubbing   Breast: Left surgical incision healed well,  no new nodules or lymphadenopathy.  No right breast nodules or lymphadenopathy        Laboratory Data:  Lab Results   Component Value Date    WBC 3.9 (L) 05/29/2024    HGB 12.6 05/29/2024    HCT 39.1 05/29/2024    MCV 87 05/29/2024     05/29/2024    ANC 2.31 11/21/2023       Chemistry    Lab Results   Component Value Date/Time     05/29/2024 0638    K 4.2 05/29/2024 0638     05/29/2024 0638    CO2 31 05/29/2024 0638    BUN 16 05/29/2024 0638    CREATININE 1.12 (H) 05/29/2024 0638    Lab Results   Component Value Date/Time    CALCIUM 9.6 05/29/2024 0638    ALKPHOS 67 05/29/2024 0638    AST 23 05/29/2024 0638    ALT 18 05/29/2024 0638    BILITOT 0.7 05/29/2024 0638             Radiology:  Onco-Echo Complete (Strain And 3D)     [facility], [address] [place] [address], [place] [address]                 Tel [phone] and Fax [phone]    TRANSTHORACIC ECHOCARDIOGRAM  REPORT       Patient Name:      HUNTER VELA     [place]alle Physician:    [id number] Ceci Rawls MD  Study Date:        5/20/2024            Ordering Provider:    [id number] GÉNESIS WOODS  MRN/PID:           [id number]             Fellow:  Accession#:        [id number]         Nurse:  Date of Birth/Age: 1955 / 68 years Sonographer:          Janel APONTE  Gender:            F                    Additional Staff:  Height:            149.86 cm            Admit Date:  Weight:            58.06 kg             Admission Status:  BSA / BMI:         1.53 m2 / 25.85      Encounter#:           [id number]                     kg/m2                                          Department Location:  [facility] Non                                                                Invasive  Blood Pressure: 165 /75 mmHg    Study Type:    ONCO-ECHO COMPLETE (STRAIN AND 3D)  Diagnosis/ICD: Encounter for monitoring cardiotoxic drug therapy-Z51.81  Indication:    Encounter for monitoring cardiotoxic drug therapy  CPT Code:      Echo Complete w Full Doppler-23324; 3D Rendering w/o independent                 workstation-[id number]; Myocardial Strain Imaging-03172    Patient History:  Pertinent History: Cancer. Breast cancer.    Study Detail: The following Echo studies were performed: 2D, M-Mode, Doppler,                color flow, Strain and 3D. Technically challenging study due to                prominent lung artifact.       PHYSICIAN INTERPRETATION:  Left Ventricle: The left ventricular systolic function is normal, with an estimated ejection fraction of 55-60%. There are no regional wall motion abnormalities. The left ventricular cavity size is normal. There is a false tendon visualized in the left ventricle. Spectral Doppler shows a normal pattern of left ventricular diastolic filling.  Left Atrium: The left atrium is  normal in size.  Right Ventricle: The right ventricle is normal in size. There is normal right ventricular global systolic function.  Right Atrium: The right atrium is normal in size.  Aortic Valve: The aortic valve is trileaflet. There is no evidence of aortic valve regurgitation. The peak instantaneous gradient of the aortic valve is 5.5 mmHg.  Mitral Valve: The mitral valve is normal in structure. There is trace to mild mitral valve regurgitation.  Tricuspid Valve: The tricuspid valve is structurally normal. There is trace tricuspid regurgitation. The Doppler estimated RVSP is within normal limits at 17.8 mmHg. RVSP may be underestimated due to incomplete TR CW Doppler envelope.  Pulmonic Valve: The pulmonic valve is structurally normal. There is physiologic pulmonic valve regurgitation.  Pericardium: There is a trivial pericardial effusion.  Aorta: The aortic root is normal. The Ao Sinus is 3.50 cm. The Asc Ao is 3.00 cm.  Systemic Veins: The inferior vena cava appears to be of normal size. There is IVC inspiratory collapse greater than 50%.  In comparison to the previous echocardiogram(s): Compared with the prior exam from 10/13/2023( [place]) , the LVEF has dropped from 67% to 55% without any regional wall motion abnormalities, and the GLS has reduced from normal at -20.7% to borderline at -17.8%.     ONCO-CARDIOLOGY:  Previous Study: The patient had a previous Onco-Cardiology echocardiogram dated  10/13/2023. The patient's previous study was performed on the [facility].       Onco-Cardiology Measurements:  Historical Measurements from Previous Study  2D EF ([facility])                     67%  Global Longitudinal Strain (GLS) -20.7%    Current Measurements  2D EF (Biplane)                  55.3%  3D EF                              55%  Global Longitudinal Strain (GLS) -17.8    GLS Tracking Quality:       CONCLUSIONS:   1. Left ventricular systolic function is normal with a 55-60% estimated ejection fraction.   2. LV  false tendon present.   3. RVSP within normal limits.   4. Compared with the prior exam from 10/13/2023( [place]) , the LVEF has dropped from 67% to 55% without any regional wall motion abnormalities, and the GLS has reduced from normal at -20.7% to borderline at -17.8%.    QUANTITATIVE DATA SUMMARY:  2D MEASUREMENTS:                           Normal Ranges:  IVSd:          1.02 cm   (0.6-1.1cm)  LVPWd:         0.90 cm   (0.6-1.1cm)  LVIDd:         3.96 cm   (3.9-5.9cm)  LVIDs:         2.31 cm  LV Mass Index: 77.7 g/m2  LV % FS        41.6 %    LA VOLUME:                                Normal Ranges:  LA Vol A4C:        34.7 ml    (22+/-6mL/m2)  LA Vol A2C:        32.7 ml  LA Vol BP:         33.7 ml  LA Vol Index A4C:  22.7ml/m2  LA Vol Index A2C:  21.4 ml/m2  LA Vol Index BP:   22.1 ml/m2  LA Area A4C:       13.7 cm2  LA Area A2C:       13.3 cm2  LA Major Axis A4C: 4.6 cm  LA Major Axis A2C: 4.6 cm  LA Volume Index:   22.1 ml/m2    RA VOLUME BY A/L METHOD:                                Normal Ranges:  RA Vol A4C:        25.1 ml    (8.3-19.5ml)  RA Vol Index A4C:  16.5 ml/m2  RA Area A4C:       10.6 cm2  RA Major Axis A4C: 3.8 cm    AORTA MEASUREMENTS:                       Normal Ranges:  Ao Sinus, d: 3.50 cm (2.1-3.5cm)  Asc Ao, d:   3.00 cm (2.1-3.4cm)    LV SYSTOLIC FUNCTION BY 2D PLANIMETRY (MOD):                      Normal Ranges:  EF-A4C View: 56.5 % (>=55%)  EF-A2C View: 54.2 %    LV DIASTOLIC FUNCTION:                               Normal Ranges:  MV Peak E:        0.85 m/s   (0.7-1.2 m/s)  MV Peak A:        0.75 m/s   (0.42-0.7 m/s)  E/A Ratio:        1.13       (1.0-2.2)  MV e'             0.12 m/s   (>8.0)  MV lateral e'     0.12 m/s  MV medial e'      0.12 m/s  E/e' Ratio:       7.05       (<8.0)  PulmV Sys Steve:    54.68 cm/s  PulmV Edge Steve:   65.73 cm/s  PulmV S/D Steve:    0.83  PulmV A Revs Steve: 25.10 cm/s  PulmV A Revs Dur: 74.22 msec    MITRAL VALVE:                  Normal Ranges:  MV DT: 174 msec  (150-240msec)    AORTIC VALVE:                          Normal Ranges:  AoV Vmax:      1.17 m/s (<=1.7m/s)  AoV Peak P.5 mmHg (<20mmHg)  LVOT Max Steve:  0.89 m/s (<=1.1m/s)  LVOT VTI:      18.54 cm  LVOT Diameter: 1.82 cm  (1.8-2.4cm)  AoV Area,Vmax: 1.99 cm2 (2.5-4.5cm2)       RIGHT VENTRICLE:  RV Basal 3.50 cm  RV Mid   2.10 cm  RV Major 5.7 cm  TAPSE:   19.0 mm  RV s'    0.13 m/s    TRICUSPID VALVE/RVSP:                              Normal Ranges:  Peak TR Velocity: 1.93 m/s  Est. RA Pressure: 3 mmHg  RV Syst Pressure: 17.8 mmHg (< 30mmHg)  IVC Diam:         1.50 cm    PULMONIC VALVE:                          Normal Ranges:  PV Accel Time: 157 msec (>120ms)  PV Max Steve:    0.7 m/s  (0.6-0.9m/s)  PV Max P.0 mmHg    Pulmonary Veins:  PulmV A Revs Dur: 74.22 msec  PulmV A Revs Steve: 25.10 cm/s  PulmV Edge Steve:   65.73 cm/s  PulmV S/D Steve:    0.83  PulmV Sys Steve:    54.68 cm/s    AORTA:  Asc Ao Diam 2.97 cm       [id number] Ceci Rawls MD  Electronically signed on 2024 at 3:53:12 PM       ** Final **       BI mammo bilateral screening tomosynthesis 2023    Narrative  Interpreted By:  ENZO MANUEL MD  MRN: [id number]  Patient Name: HUNTER VELA    STUDY:  DIGITAL MAMM SCREENING W/ RUBI;  2023 8:00 am    ACCESSION NUMBER(S):  [id number]    ORDERING CLINICIAN:  KATIE HINOJOSA    INDICATION:  Screening.    COMPARISON:  2021, 2020, 2019    FINDINGS:  2D and tomosynthesis images were reviewed at 1 mm slice thickness.    There are areas of scattered fibroglandular tissue.  There are 2  adjacent irregular spiculated equal density masses in the upper outer  left breast at middle depth. No suspicious masses or calcifications  are identified in the right breast.    Impression  No mammographic evidence of malignancy in the right breast. Left  breast mass x2.    BI-RADS CATEGORY:    Category: 0 - Incomplete; Need Additional Imaging Evaluation and/or  Prior Mammograms for  Comparison.  Recommendation: Ultrasound Recommended.    For any future breast imaging appointments, please call [phone]  ([phone]).    Patient letter sent SADEVAL          Assessment/Plan:    68-year-old female previously healthy found to have on screening mammogram 2 adjacent irregular spiculated masses in the upper outer left breast at middle depth with  US showing a 5mm mass with left axilla showing 4 negative LN s/p  guided core needle  invasive lobular carcinoma, grade 2 with  ER 95%, TN 20%, HER2 positive 3+, also had lobular carcinoma in situ. Dr. Blanchard performed left partial mastectomy with sentinel  lymph node biopsy (0/1) 2 foci of cancer largest 1 measuring 13 mm, second 1 measuring 7 mm, margins were negative final stage PT1CN0. She is presenting to discuss adjuvant treatment options.      I reviewed with her the events that led to her diagnosis of breast cancer. We reviewed all the procedures and diagnostic imaging she underwent thus far. I discussed the features of her breast cancer that include the size, grade, lymph node status and  hormone receptor/ her2-ying status.     Based on APT trial recommended to proceed with THx12 weeks followed by radiation followed by hormonal therapy and herceptin to complete for a total of 1 year     Based on the updated 10 year results of the APT trial published in the [employer]ancet in March of this year:      410 patients were enrolled and 406 were given adjuvant paclitaxel and trastuzumab and included in the analysis. Mean age at enrolment was 55 years (SD 10·5), 405 (99·8%) of 406 patients were female and one (0·2%) was male, 350 (86·2%) were  White, 28 (6·9%) were Black or , and 272 (67·0%) had hormone receptor-positive disease. After a median follow-up of 10·8 years (IQR 7·1-11·4), among 406 patients included in the analysis population, we observed 31  invasive disease-free survival events, of which six (19·4%) were locoregional ipsilateral  recurrences, nine (29·0%) were new contralateral breast cancers, six (19·4%) were distant recurrences, and ten (32·3%) were all-cause deaths.  10-year invasive disease-free survival was 91·3% (95% CI 88·3-94·4), 10-year recurrence-free interval was 96·3% (95% CI 94·3-98·3), 10-year overall survival was 94·3% (95% CI 91·8-96·8), and 10-year breast  cancer-specific survival was 98·8% (95% CI 97·6-100)    on 01/02/2024: completed TH-     On 02/20/2024: completed radiation therapy    Echo showed 12% Decrease In EF  Holding herceptin for now and has fup with Dr Osuna   Continue exemestane, Ca/vit D    RTC in 2 weeks to discuss potentially resuming herceptin if she is okay with it.      At least 35 minutes of direct consultation was spent reviewing the patient's chart as well as discussing and  reviewing the  cancer care plan including educating and answering  questions and concerns, greater than 50 percent spent in counseling and coordination  of care.            Cony Amin MD  Hematology and Medical Oncology  [facility]

## 2024-06-07 ENCOUNTER — OFFICE VISIT (OUTPATIENT)
Dept: CARDIOLOGY | Facility: CLINIC | Age: 69
End: 2024-06-07
Payer: MEDICARE

## 2024-06-07 VITALS — HEART RATE: 89 BPM | SYSTOLIC BLOOD PRESSURE: 142 MMHG | DIASTOLIC BLOOD PRESSURE: 74 MMHG

## 2024-06-07 DIAGNOSIS — R42 EPISODIC LIGHTHEADEDNESS: ICD-10-CM

## 2024-06-07 DIAGNOSIS — Z92.3 HISTORY OF THORACIC IRRADIATION: ICD-10-CM

## 2024-06-07 DIAGNOSIS — Z17.0 MALIGNANT NEOPLASM OF UPPER-OUTER QUADRANT OF LEFT BREAST IN FEMALE, ESTROGEN RECEPTOR POSITIVE (MULTI): ICD-10-CM

## 2024-06-07 DIAGNOSIS — C50.912 INVASIVE LOBULAR CARCINOMA OF LEFT BREAST IN FEMALE (MULTI): Primary | ICD-10-CM

## 2024-06-07 DIAGNOSIS — C50.412 MALIGNANT NEOPLASM OF UPPER-OUTER QUADRANT OF LEFT BREAST IN FEMALE, ESTROGEN RECEPTOR POSITIVE (MULTI): ICD-10-CM

## 2024-06-07 DIAGNOSIS — Z78.9 NEVER SMOKED ANY SUBSTANCE: ICD-10-CM

## 2024-06-07 DIAGNOSIS — Z79.899 ENCOUNTER FOR MONITORING CARDIOTOXIC DRUG THERAPY: ICD-10-CM

## 2024-06-07 DIAGNOSIS — Z51.81 ENCOUNTER FOR MONITORING CARDIOTOXIC DRUG THERAPY: ICD-10-CM

## 2024-06-07 PROCEDURE — 99214 OFFICE O/P EST MOD 30 MIN: CPT | Performed by: INTERNAL MEDICINE

## 2024-06-07 PROCEDURE — 1159F MED LIST DOCD IN RCRD: CPT | Performed by: INTERNAL MEDICINE

## 2024-06-07 PROCEDURE — 1160F RVW MEDS BY RX/DR IN RCRD: CPT | Performed by: INTERNAL MEDICINE

## 2024-06-07 PROCEDURE — 1036F TOBACCO NON-USER: CPT | Performed by: INTERNAL MEDICINE

## 2024-06-07 ASSESSMENT — ENCOUNTER SYMPTOMS
NAUSEA: 1
NERVOUS/ANXIOUS: 1
ARTHRALGIAS: 1
PALPITATIONS: 0
FATIGUE: 1
SHORTNESS OF BREATH: 0
WEAKNESS: 1
DIZZINESS: 1

## 2024-06-07 NOTE — PROGRESS NOTES
Patient:  Zina Hernandez  YOB: 1955  MRN: [id number]       Chief Complaint/Active Symptoms:       Zina Hernandez is a 68 y.o. female who returns today for cardiac follow-up.    Patient started low dose losartan after resisting starting any drugs. Since then has felt faint starting several hours after taking the medications. Took it at 6:30 this morning. Is in a wheelchair and unable to stand for weight and other vitals. BP and HR are normal. No nausea or vomiting. Normally eats her crackers and lays in bed for several hours before she starts to feel better. She can then get up and move about the rest of the day.     Normally drinks about 32 oz of fluid daily.     Cancer Diagnosis: Left breast cancer, ER=, PR20%+, HER2+  Oncologist: Dr. Cony Amin  Treatment:  Paclitaxel and Herceptin, lumpectomy in August 2023.  To continue with Herceptin through October 2024 after radiation therapy. Now on exemestane. Still getting herceptin every 3 weeks  Radiation: 4256 cGY (16 of 16 fractions) to left chest and axilla.   Risk factors: Age     Echo 10/2023: EF 65-70%, strain -20.7  Echo 5/2024: EF 55-60%, strain -17.8  Troponins negative 5/2024    Review of Systems   Constitutional:  Positive for fatigue.   Respiratory:  Negative for shortness of breath.    Cardiovascular:  Negative for chest pain, palpitations and leg swelling.   Gastrointestinal:  Positive for nausea.   Musculoskeletal:  Positive for arthralgias (left arm pain / aching in mornings.).   Neurological:  Positive for dizziness and weakness.   Psychiatric/Behavioral:  The patient is nervous/anxious.        Objective:     Vitals:    06/07/24 0936   BP: 142/74   Pulse:          Allergies:     Allergies   Allergen Reactions    Paclitaxel Other     Back pain and chest pain.     Tamiflu [Oseltamivir] Unknown          Medications:     Current Outpatient Medications   Medication Instructions    dexAMETHasone 8 mg, oral, 2 times daily    dexAMETHasone 4  "mg, oral, 2 times daily    exemestane (AROMASIN) 25 mg, oral, Daily, Take after a meal.  Try to take at the same time each day.    loperamide (IMODIUM A-D) 2 mg, oral    losartan (COZAAR) 25 mg, oral, Daily       Physical Examination:   GENERAL:  Well developed, well nourished, in no acute distress.  HEENT: NC AT, EOMI with anicteric sclera  NECK:  no JVD, no bruit.  CHEST:  Symmetric and nontender.  LUNGS:  Clear to auscultation bilaterally, normal respiratory effort.  HEART:  PMI nondisplaced, RRR with normal S1 and S2,no S3 or appreciable murmur  ABDOMEN: Soft, NT, ND without palpable organomegaly or bruits  EXTREMITIES:  Warm with good color, no clubbing or cyanosis.  There is no edema noted.  PERIPHERAL VASCULAR:  Pulses present and equally palpable; 2+ throughout.  MUSCULOSKELETAL: Mild OA changes  NEURO/PSYCH:  Alert and oriented times three with approppriate behavior and responses. Nonfocal motor examination.    Lab:     CBC:   Lab Results   Component Value Date    WBC 3.9 (L) 05/29/2024    RBC 4.48 05/29/2024    HGB 12.6 05/29/2024    HCT 39.1 05/29/2024     05/29/2024        CMP:    Lab Results   Component Value Date     05/29/2024    K 4.2 05/29/2024     05/29/2024    CO2 31 05/29/2024    BUN 16 05/29/2024    CREATININE 1.12 (H) 05/29/2024    GLUCOSE 89 05/29/2024    CALCIUM 9.6 05/29/2024       Magnesium:    Lab Results   Component Value Date    MG 2.00 03/16/2022       Lipid Profile:    Lab Results   Component Value Date    TRIG 98 05/29/2024    HDL 66.3 05/29/2024    LDLCALC 80 05/29/2024       TSH:    Lab Results   Component Value Date    TSH 5.84 (H) 05/29/2024       BNP:   Lab Results   Component Value Date    BNP 7 05/07/2024        PT/INR:    Lab Results   Component Value Date    PROTIME 11.7 10/11/2023    INR 1.0 10/11/2023       HgBA1c:    No results found for: \"HGBA1C\"    BMP:  Lab Results   Component Value Date     05/29/2024     02/13/2024     01/02/2024 "    K 4.2 05/29/2024    K 3.7 02/13/2024    K 3.7 01/02/2024     05/29/2024     02/13/2024     01/02/2024    CO2 31 05/29/2024    CO2 28 02/13/2024    CO2 26 01/02/2024    BUN 16 05/29/2024    BUN 14 02/13/2024    BUN 23 01/02/2024    CREATININE 1.12 (H) 05/29/2024    CREATININE 0.89 02/13/2024    CREATININE 0.84 01/02/2024       Cardiac Enzymes:    Lab Results   Component Value Date    TROPHS <3 05/29/2024    TROPHS <3 05/07/2024    TROPHS <3 01/15/2024       Hepatic Function Panel:    Lab Results   Component Value Date    ALKPHOS 67 05/29/2024    ALT 18 05/29/2024    AST 23 05/29/2024    PROT 7.0 05/29/2024    BILITOT 0.7 05/29/2024         Diagnostic Studies:     Echo 5/2024 reviewed    Radiology:     No orders to display     ASSESSMENT     Problem List Items Addressed This Visit       Invasive lobular carcinoma of left breast in female (Multi) - Primary    Relevant Orders    Follow Up In Cardiology    Malignant neoplasm of upper-outer quadrant of left breast in female, estrogen receptor positive (Multi)    Relevant Orders    Follow Up In Cardiology    Encounter for monitoring cardiotoxic drug therapy    Never smoked any substance    History of thoracic irradiation       PLAN     1.  Left breast cancer with cardiotoxic drug therapy.  History of thoracic irradiation.  Patient has a mild drop in her ejection fraction and strain.  Her ejection fraction prior to starting therapy was actually hyperdynamic that it is dropped into the normal range and strain has had a mild drop.  This is not accompanied by any signs or symptoms of congestive heart failure.  We have started her on low-dose losartan and the patient was very resistant to start any medications and now feels that this medication is causing her a lot of issues.  She complains of weakness and lightheadedness get her vital signs here in the office are actually hypertensive without any tachycardia.  In order to have her tolerate this we have  recommend that she increase her hydration as she is poorly hydrated and to take the medication in the evening before bedtime as the side effects or symptoms that she experienced usually wears off in 2 hours so these would be resolved by the time she is up and able to be around in the morning.  Of reassuring note there is no angina and there is no signs of any troponin elevation or heart failure with this minor change in ejection fraction.  2.  Episodic lightheadedness.  The patient's episodes of lightheadedness do not really correlate with hypotension.  After we talked about the fact that her blood pressure is actually high she admits that she checked her blood pressure at home when she was lightheaded and she got a reading in the 120s systolic.  We have encouraged hydration and again because of the side effects we have shifted the medication to the evening time and we will avoid adding any other medications unless we see signs that there is ongoing change in her heart function.      At this time we will continue on her current therapy and have asked her to contact me if she continues to have any symptoms with these adjustments and hydration as well as changing it to the evening dose.  Will see her in the office in follow-up in August after she has her next echocardiogram.  At this time I do not see a reason from a heart standpoint why her medication has to be changed or discontinued but will defer any changes in her treatment regimen to her oncologist.

## 2024-06-07 NOTE — PATIENT INSTRUCTIONS
Shift losartan to evening to avoid symptoms.  Monitor home blood pressures - if systolic blood pressure < 100 please call.     Increase hydration to 64 oz daily

## 2024-06-18 ENCOUNTER — INFUSION (OUTPATIENT)
Dept: HEMATOLOGY/ONCOLOGY | Facility: CLINIC | Age: 69
End: 2024-06-18
Payer: MEDICARE

## 2024-06-18 ENCOUNTER — OFFICE VISIT (OUTPATIENT)
Dept: HEMATOLOGY/ONCOLOGY | Facility: CLINIC | Age: 69
End: 2024-06-18
Payer: MEDICARE

## 2024-06-18 VITALS
WEIGHT: 128.8 LBS | HEART RATE: 74 BPM | BODY MASS INDEX: 25.59 KG/M2 | OXYGEN SATURATION: 97 % | TEMPERATURE: 97.7 F | SYSTOLIC BLOOD PRESSURE: 154 MMHG | RESPIRATION RATE: 18 BRPM | DIASTOLIC BLOOD PRESSURE: 74 MMHG

## 2024-06-18 DIAGNOSIS — C50.912 INVASIVE LOBULAR CARCINOMA OF LEFT BREAST IN FEMALE (MULTI): Primary | ICD-10-CM

## 2024-06-18 DIAGNOSIS — C50.912 INVASIVE LOBULAR CARCINOMA OF LEFT BREAST IN FEMALE (MULTI): ICD-10-CM

## 2024-06-18 PROCEDURE — 1036F TOBACCO NON-USER: CPT | Performed by: STUDENT IN AN ORGANIZED HEALTH CARE EDUCATION/TRAINING PROGRAM

## 2024-06-18 PROCEDURE — 99214 OFFICE O/P EST MOD 30 MIN: CPT | Performed by: STUDENT IN AN ORGANIZED HEALTH CARE EDUCATION/TRAINING PROGRAM

## 2024-06-18 PROCEDURE — 2500000004 HC RX 250 GENERAL PHARMACY W/ HCPCS (ALT 636 FOR OP/ED): Mod: JG | Performed by: STUDENT IN AN ORGANIZED HEALTH CARE EDUCATION/TRAINING PROGRAM

## 2024-06-18 PROCEDURE — 1126F AMNT PAIN NOTED NONE PRSNT: CPT | Performed by: STUDENT IN AN ORGANIZED HEALTH CARE EDUCATION/TRAINING PROGRAM

## 2024-06-18 PROCEDURE — 96413 CHEMO IV INFUSION 1 HR: CPT

## 2024-06-18 PROCEDURE — 99214 OFFICE O/P EST MOD 30 MIN: CPT | Mod: 25 | Performed by: STUDENT IN AN ORGANIZED HEALTH CARE EDUCATION/TRAINING PROGRAM

## 2024-06-18 PROCEDURE — 1159F MED LIST DOCD IN RCRD: CPT | Performed by: STUDENT IN AN ORGANIZED HEALTH CARE EDUCATION/TRAINING PROGRAM

## 2024-06-18 PROCEDURE — 2500000004 HC RX 250 GENERAL PHARMACY W/ HCPCS (ALT 636 FOR OP/ED): Performed by: STUDENT IN AN ORGANIZED HEALTH CARE EDUCATION/TRAINING PROGRAM

## 2024-06-18 RX ORDER — FAMOTIDINE 10 MG/ML
20 INJECTION INTRAVENOUS ONCE AS NEEDED
OUTPATIENT
Start: 2024-09-10

## 2024-06-18 RX ORDER — ALBUTEROL SULFATE 0.83 MG/ML
3 SOLUTION RESPIRATORY (INHALATION) AS NEEDED
OUTPATIENT
Start: 2024-09-10

## 2024-06-18 RX ORDER — HEPARIN SODIUM,PORCINE/PF 10 UNIT/ML
50 SYRINGE (ML) INTRAVENOUS AS NEEDED
OUTPATIENT
Start: 2024-06-18

## 2024-06-18 RX ORDER — HEPARIN SODIUM,PORCINE/PF 10 UNIT/ML
50 SYRINGE (ML) INTRAVENOUS AS NEEDED
Status: DISCONTINUED | OUTPATIENT
Start: 2024-06-18 | End: 2024-06-18 | Stop reason: HOSPADM

## 2024-06-18 RX ORDER — DIPHENHYDRAMINE HYDROCHLORIDE 50 MG/ML
50 INJECTION INTRAMUSCULAR; INTRAVENOUS AS NEEDED
OUTPATIENT
Start: 2024-08-20

## 2024-06-18 RX ORDER — PROCHLORPERAZINE EDISYLATE 5 MG/ML
10 INJECTION INTRAMUSCULAR; INTRAVENOUS EVERY 6 HOURS PRN
OUTPATIENT
Start: 2024-10-01

## 2024-06-18 RX ORDER — DIPHENHYDRAMINE HYDROCHLORIDE 50 MG/ML
50 INJECTION INTRAMUSCULAR; INTRAVENOUS AS NEEDED
OUTPATIENT
Start: 2024-10-01

## 2024-06-18 RX ORDER — PROCHLORPERAZINE EDISYLATE 5 MG/ML
10 INJECTION INTRAMUSCULAR; INTRAVENOUS EVERY 6 HOURS PRN
Status: DISCONTINUED | OUTPATIENT
Start: 2024-06-18 | End: 2024-06-18 | Stop reason: HOSPADM

## 2024-06-18 RX ORDER — FAMOTIDINE 10 MG/ML
20 INJECTION INTRAVENOUS ONCE AS NEEDED
Status: DISCONTINUED | OUTPATIENT
Start: 2024-06-18 | End: 2024-06-18 | Stop reason: HOSPADM

## 2024-06-18 RX ORDER — DIPHENHYDRAMINE HYDROCHLORIDE 50 MG/ML
50 INJECTION INTRAMUSCULAR; INTRAVENOUS AS NEEDED
OUTPATIENT
Start: 2024-07-30

## 2024-06-18 RX ORDER — DIPHENHYDRAMINE HYDROCHLORIDE 50 MG/ML
50 INJECTION INTRAMUSCULAR; INTRAVENOUS AS NEEDED
OUTPATIENT
Start: 2024-09-10

## 2024-06-18 RX ORDER — EPINEPHRINE 0.3 MG/.3ML
0.3 INJECTION SUBCUTANEOUS EVERY 5 MIN PRN
OUTPATIENT
Start: 2024-10-01

## 2024-06-18 RX ORDER — PROCHLORPERAZINE EDISYLATE 5 MG/ML
10 INJECTION INTRAMUSCULAR; INTRAVENOUS EVERY 6 HOURS PRN
OUTPATIENT
Start: 2024-09-10

## 2024-06-18 RX ORDER — PROCHLORPERAZINE MALEATE 5 MG
10 TABLET ORAL EVERY 6 HOURS PRN
OUTPATIENT
Start: 2024-10-01

## 2024-06-18 RX ORDER — FAMOTIDINE 10 MG/ML
20 INJECTION INTRAVENOUS ONCE AS NEEDED
OUTPATIENT
Start: 2024-07-30

## 2024-06-18 RX ORDER — PROCHLORPERAZINE EDISYLATE 5 MG/ML
10 INJECTION INTRAMUSCULAR; INTRAVENOUS EVERY 6 HOURS PRN
OUTPATIENT
Start: 2024-07-30

## 2024-06-18 RX ORDER — PROCHLORPERAZINE MALEATE 5 MG
10 TABLET ORAL EVERY 6 HOURS PRN
OUTPATIENT
Start: 2024-07-30

## 2024-06-18 RX ORDER — PROCHLORPERAZINE MALEATE 5 MG
10 TABLET ORAL EVERY 6 HOURS PRN
OUTPATIENT
Start: 2024-09-10

## 2024-06-18 RX ORDER — HEPARIN 100 UNIT/ML
500 SYRINGE INTRAVENOUS AS NEEDED
Status: DISCONTINUED | OUTPATIENT
Start: 2024-06-18 | End: 2024-06-18 | Stop reason: HOSPADM

## 2024-06-18 RX ORDER — ALBUTEROL SULFATE 0.83 MG/ML
3 SOLUTION RESPIRATORY (INHALATION) AS NEEDED
OUTPATIENT
Start: 2024-08-20

## 2024-06-18 RX ORDER — EPINEPHRINE 0.3 MG/.3ML
0.3 INJECTION SUBCUTANEOUS EVERY 5 MIN PRN
OUTPATIENT
Start: 2024-09-10

## 2024-06-18 RX ORDER — FAMOTIDINE 10 MG/ML
20 INJECTION INTRAVENOUS ONCE AS NEEDED
OUTPATIENT
Start: 2024-10-01

## 2024-06-18 RX ORDER — EPINEPHRINE 0.3 MG/.3ML
0.3 INJECTION SUBCUTANEOUS EVERY 5 MIN PRN
Status: DISCONTINUED | OUTPATIENT
Start: 2024-06-18 | End: 2024-06-18 | Stop reason: HOSPADM

## 2024-06-18 RX ORDER — ALBUTEROL SULFATE 0.83 MG/ML
3 SOLUTION RESPIRATORY (INHALATION) AS NEEDED
OUTPATIENT
Start: 2024-10-01

## 2024-06-18 RX ORDER — FAMOTIDINE 10 MG/ML
20 INJECTION INTRAVENOUS ONCE AS NEEDED
OUTPATIENT
Start: 2024-08-20

## 2024-06-18 RX ORDER — EPINEPHRINE 0.3 MG/.3ML
0.3 INJECTION SUBCUTANEOUS EVERY 5 MIN PRN
OUTPATIENT
Start: 2024-08-20

## 2024-06-18 RX ORDER — HEPARIN 100 UNIT/ML
500 SYRINGE INTRAVENOUS AS NEEDED
OUTPATIENT
Start: 2024-06-18

## 2024-06-18 RX ORDER — PROCHLORPERAZINE EDISYLATE 5 MG/ML
10 INJECTION INTRAMUSCULAR; INTRAVENOUS EVERY 6 HOURS PRN
OUTPATIENT
Start: 2024-08-20

## 2024-06-18 RX ORDER — PROCHLORPERAZINE MALEATE 10 MG
10 TABLET ORAL EVERY 6 HOURS PRN
Status: DISCONTINUED | OUTPATIENT
Start: 2024-06-18 | End: 2024-06-18 | Stop reason: HOSPADM

## 2024-06-18 RX ORDER — PROCHLORPERAZINE MALEATE 5 MG
10 TABLET ORAL EVERY 6 HOURS PRN
OUTPATIENT
Start: 2024-08-20

## 2024-06-18 RX ORDER — EPINEPHRINE 0.3 MG/.3ML
0.3 INJECTION SUBCUTANEOUS EVERY 5 MIN PRN
OUTPATIENT
Start: 2024-07-30

## 2024-06-18 RX ORDER — DIPHENHYDRAMINE HYDROCHLORIDE 50 MG/ML
50 INJECTION INTRAMUSCULAR; INTRAVENOUS AS NEEDED
Status: DISCONTINUED | OUTPATIENT
Start: 2024-06-18 | End: 2024-06-18 | Stop reason: HOSPADM

## 2024-06-18 RX ORDER — ALBUTEROL SULFATE 0.83 MG/ML
3 SOLUTION RESPIRATORY (INHALATION) AS NEEDED
OUTPATIENT
Start: 2024-07-30

## 2024-06-18 RX ORDER — ALBUTEROL SULFATE 0.83 MG/ML
3 SOLUTION RESPIRATORY (INHALATION) AS NEEDED
Status: DISCONTINUED | OUTPATIENT
Start: 2024-06-18 | End: 2024-06-18 | Stop reason: HOSPADM

## 2024-06-18 ASSESSMENT — ENCOUNTER SYMPTOMS
OCCASIONAL FEELINGS OF UNSTEADINESS: 0
DEPRESSION: 0
LOSS OF SENSATION IN FEET: 0

## 2024-06-18 ASSESSMENT — PAIN SCALES - GENERAL: PAINLEVEL: 0-NO PAIN

## 2024-06-18 NOTE — PROGRESS NOTES
Patient ID: Zina Hernandez is a 68 y.o. female.    The patient presents to clinic today for her history of breast cancer.     Cancer Staging   No matching staging information was found for the patient.        Diagnostic/Therapeutic History:    The patient presents to clinic today for her history of breast cancer.     Diagnostic/Therapeutic History:  Cancer History:          Breast         AJCC Edition: 8th (AJCC), Diagnosis Date: 30-Aug-2023, IA, pT1c pN0 cM0 G2     Treatment Synopsis:    - On 6/14/2023 screening mammogram she had 2 adjacent irregular spiculated masses in the upper outer left breast at middle depth.  No suspicious masses or calcification  in the right breast.  BI-RADS Category 0     -On 6/28/2023 she had targeted ultrasound that showed at 1:00, 4 cm from the nipple an irregular hypoechoic mass measuring 3 x 5 x 4 mm this corresponds to the mammographic finding.  Left axilla showed 4 morphologically normal lymph nodes without lymphadenopathy      -On 7/14/2023 she had left breast mass ultrasound guided core needle  invasive lobular carcinoma, grade 2 with  ER 95%, TN 20%, HER2 positive 3+, also had lobular carcinoma in situ     -On 8/30/2023 Dr. Blanchard performed left partial mastectomy with sentinel lymph node biopsy (0/1) 2 foci of cancer largest 1 measuring 13 mm, second 1 measuring 7 mm, margins were negative final stage [id number]     -on 01/02/2024: completed TH    - On 02/20/2024: completed radiation therapy      History of Present Illness (HPI)/Interval History:    Echo showed 12% Decrease In EF  Holding herceptin for now and has fup with Dr Osuna   No CP, or SOB  Does have lightheadedness daily since starting losaran  Is currently on exemestane.  Does have joint stiffness in hand in morning  No fever, no chills   Does have left sided breast pain since starting losartan         14 point review of system otherwise unremarkable     PMH: as above     Meds: allergies reviewed       Reproductive history: Menarche at 13, AFLB: 27,  menopause at 50, no HRT     Family history maternal grandmother with uterine cancer in her 60s    She denies any fevers or chills.      Review of Systems:  14-point ROS otherwise negative, as per HPI.    Past Medical History:   Diagnosis Date    Cancer (Multi)     Migraine     Other chest pain 01/15/2024       Past Surgical History:   Procedure Laterality Date    BREAST LUMPECTOMY         Social History     Socioeconomic History    Marital status:      Spouse name: None    Number of children: None    Years of education: None    Highest education level: None   Occupational History    None   Tobacco Use    Smoking status: Never    Smokeless tobacco: Never   Substance and Sexual Activity    Alcohol use: Never    Drug use: Never    Sexual activity: None   Other Topics Concern    None   Social History Narrative    None     Social Determinants of Health     Financial Resource Strain: Low Risk  (6/2/2024)    Received from [facility]    Overall Financial Resource Strain (CARDIA)     Difficulty of Paying Living Expenses: Not hard at all   Food Insecurity: No Food Insecurity (6/2/2024)    Received from [facility]    Hunger Vital Sign     Worried About Running Out of Food in the Last Year: Never true     Ran Out of Food in the Last Year: Never true   Transportation Needs: No Transportation Needs (6/2/2024)    Received from [facility]    PRAPARE - Transportation     Lack of Transportation (Medical): No     Lack of Transportation (Non-Medical): No   Physical Activity: Patient Declined (6/2/2024)    Received from [facility]    Exercise Vital Sign     Days of Exercise per Week: Patient declined     Minutes of Exercise per Session: Patient declined   Stress: Stress Concern Present (6/2/2024)    Received from [facility]    [employer] - Occupational Stress Questionnaire     Feeling of Stress : To some extent   Social  Connections: Moderately Integrated (6/2/2024)    Received from [facility]    Social Connection and Isolation Panel [NHANES]     Frequency of Communication with Friends and Family: More than three times a week     Frequency of Social Gatherings with Friends and Family: More than three times a week     Attends [employer] Services: 1 to 4 times per year     Active Member of Clubs or Organizations: No     Attends Club or Organization Meetings: Patient declined     Marital Status: Living with partner   Intimate Partner Violence: Not on file   Housing Stability: Low Risk  (6/2/2024)    Received from [facility]    Housing Stability Vital Sign     Unable to Pay for Housing in the Last Year: No     Number of Places Lived in the Last Year: 1     Unstable Housing in the Last Year: No       Allergies   Allergen Reactions    Paclitaxel Other     Back pain and chest pain.     Tamiflu [Oseltamivir] Unknown         Current Outpatient Medications:     exemestane (Aromasin) 25 mg tablet, Take 1 tablet (25 mg total) by mouth once daily.  Take after a meal.  Try to take at the same time each day., Disp: 30 tablet, Rfl: 3    loperamide (Imodium A-D) 1 mg/7.5 mL liquid, Take 15 mL (2 mg) by mouth., Disp: , Rfl:     losartan (Cozaar) 25 mg tablet, Take 1 tablet (25 mg) by mouth once daily., Disp: 90 tablet, Rfl: 3     Objective    BSA: 1.57 meters squared  /74 (BP Location: Left arm, Patient Position: Sitting, BP Cuff Size: Adult)   Pulse 74   Temp 36.5 °C (97.7 °F) (Temporal)   Resp 18   Wt 58.4 kg (128 lb 12.8 oz)   SpO2 97%   BMI 25.59 kg/m²     ECOG Performance Status: 0    Physical Exam    Constitutional: Well developed, awake/alert/oriented  x3, no distress, alert and cooperative   Eyes: PERRL, EOMI, clear sclera   ENMT: mucous membranes moist, no apparent injury,  no lesions seen   Head/Neck: Neck supple, no apparent injury, thyroid  without mass or tenderness, No JVD, trachea midline, no bruits    Respiratory/Thorax: Patent airways, CTAB, normal  breath sounds with good chest expansion, thorax symmetric   Cardiovascular: Regular, rate and rhythm, no murmurs,  2+ equal pulses of the extremities, normal S 1and S 2   Gastrointestinal: Nondistended, soft, non-tender,  no rebound tenderness or guarding, no masses palpable, no organomegaly, +BS, no bruits   Extremities: normal extremities, no cyanosis edema,  contusions or wounds, no clubbing   Breast: Left surgical incision healed well,  no new nodules or lymphadenopathy.  No right breast nodules or lymphadenopathy        Laboratory Data:  Lab Results   Component Value Date    WBC 3.9 (L) 05/29/2024    HGB 12.6 05/29/2024    HCT 39.1 05/29/2024    MCV 87 05/29/2024     05/29/2024    ANC 2.31 11/21/2023       Chemistry    Lab Results   Component Value Date/Time     05/29/2024 0638    K 4.2 05/29/2024 0638     05/29/2024 0638    CO2 31 05/29/2024 0638    BUN 16 05/29/2024 0638    CREATININE 1.12 (H) 05/29/2024 0638    Lab Results   Component Value Date/Time    CALCIUM 9.6 05/29/2024 0638    ALKPHOS 67 05/29/2024 0638    AST 23 05/29/2024 0638    ALT 18 05/29/2024 0638    BILITOT 0.7 05/29/2024 0638             Radiology:  Onco-Echo Complete (Strain And 3D)     [facility], [address] [place] [address]                 Tel [phone] and Fax [phone]    TRANSTHORACIC ECHOCARDIOGRAM REPORT       Patient Name:      HUNTER VELA     Reading Physician:    [id number] Ceci Rawls MD  Study Date:        5/20/2024            Ordering Provider:    [id number] GÉNESIS WOODS  MRN/PID:           [id number]             Fellow:  Accession#:        [id number]         Nurse:  Date of Birth/Age: 1955 / 68 years Sonographer:          Janel APONTE  Gender:            F                    Additional  Staff:  Height:            149.86 cm            Admit Date:  Weight:            58.06 kg             Admission Status:  BSA / BMI:         1.53 m2 / 25.85      Encounter#:           [id number]                     kg/m2                                          Department Location:  [facility] Non                                                                Invasive  Blood Pressure: 165 /75 mmHg    Study Type:    ONCO-ECHO COMPLETE (STRAIN AND 3D)  Diagnosis/ICD: Encounter for monitoring cardiotoxic drug therapy-Z51.81  Indication:    Encounter for monitoring cardiotoxic drug therapy  CPT Code:      Echo Complete w Full Doppler-89383; 3D Rendering w/o independent                 workstation-[id number]; Myocardial Strain Imaging-69509    Patient History:  Pertinent History: Cancer. Breast cancer.    Study Detail: The following Echo studies were performed: 2D, M-Mode, Doppler,                color flow, Strain and 3D. Technically challenging study due to                prominent lung artifact.       PHYSICIAN INTERPRETATION:  Left Ventricle: The left ventricular systolic function is normal, with an estimated ejection fraction of 55-60%. There are no regional wall motion abnormalities. The left ventricular cavity size is normal. There is a false tendon visualized in the left ventricle. Spectral Doppler shows a normal pattern of left ventricular diastolic filling.  Left Atrium: The left atrium is normal in size.  Right Ventricle: The right ventricle is normal in size. There is normal right ventricular global systolic function.  Right Atrium: The right atrium is normal in size.  Aortic Valve: The aortic valve is trileaflet. There is no evidence of aortic valve regurgitation. The peak instantaneous gradient of the aortic valve is 5.5 mmHg.  Mitral Valve: The mitral valve is normal in structure. There is trace to mild mitral valve regurgitation.  Tricuspid Valve: The tricuspid valve is structurally normal. There is trace  tricuspid regurgitation. The Doppler estimated RVSP is within normal limits at 17.8 mmHg. RVSP may be underestimated due to incomplete TR CW Doppler envelope.  Pulmonic Valve: The pulmonic valve is structurally normal. There is physiologic pulmonic valve regurgitation.  Pericardium: There is a trivial pericardial effusion.  Aorta: The aortic root is normal. The Ao Sinus is 3.50 cm. The Asc Ao is 3.00 cm.  Systemic Veins: The inferior vena cava appears to be of normal size. There is IVC inspiratory collapse greater than 50%.  In comparison to the previous echocardiogram(s): Compared with the prior exam from 10/13/2023( [place]) , the LVEF has dropped from 67% to 55% without any regional wall motion abnormalities, and the GLS has reduced from normal at -20.7% to borderline at -17.8%.     ONCO-CARDIOLOGY:  Previous Study: The patient had a previous Onco-Cardiology echocardiogram dated  10/13/2023. The patient's previous study was performed on the [employer].       Onco-Cardiology Measurements:  Historical Measurements from Previous Study  2D EF (Biplane)                     67%  Global Longitudinal Strain (GLS) -20.7%    Current Measurements  2D EF (Biplane)                  55.3%  3D EF                              55%  Global Longitudinal Strain (GLS) -17.8    GLS Tracking Quality:       CONCLUSIONS:   1. Left ventricular systolic function is normal with a 55-60% estimated ejection fraction.   2. LV false tendon present.   3. RVSP within normal limits.   4. Compared with the prior exam from 10/13/2023( [place]) , the LVEF has dropped from 67% to 55% without any regional wall motion abnormalities, and the GLS has reduced from normal at -20.7% to borderline at -17.8%.    QUANTITATIVE DATA SUMMARY:  2D MEASUREMENTS:                           Normal Ranges:  IVSd:          1.02 cm   (0.6-1.1cm)  LVPWd:         0.90 cm   (0.6-1.1cm)  LVIDd:         3.96 cm   (3.9-5.9cm)  LVIDs:         2.31 cm  LV Mass Index: 77.7 g/m2  LV % FS         41.6 %    LA VOLUME:                                Normal Ranges:  LA Vol A4C:        34.7 ml    (22+/-6mL/m2)  LA Vol A2C:        32.7 ml  LA Vol BP:         33.7 ml  LA Vol Index A4C:  22.7ml/m2  LA Vol Index A2C:  21.4 ml/m2  LA Vol Index BP:   22.1 ml/m2  LA Area A4C:       13.7 cm2  LA Area A2C:       13.3 cm2  LA Major Axis A4C: 4.6 cm  LA Major Axis A2C: 4.6 cm  LA Volume Index:   22.1 ml/m2    RA VOLUME BY A/L METHOD:                                Normal Ranges:  RA Vol A4C:        25.1 ml    (8.3-19.5ml)  RA Vol Index A4C:  16.5 ml/m2  RA Area A4C:       10.6 cm2  RA Major Axis A4C: 3.8 cm    AORTA MEASUREMENTS:                       Normal Ranges:  Ao Sinus, d: 3.50 cm (2.1-3.5cm)  Asc Ao, d:   3.00 cm (2.1-3.4cm)    LV SYSTOLIC FUNCTION BY 2D PLANIMETRY (MOD):                      Normal Ranges:  EF-A4C View: 56.5 % (>=55%)  EF-A2C View: 54.2 %    LV DIASTOLIC FUNCTION:                               Normal Ranges:  MV Peak E:        0.85 m/s   (0.7-1.2 m/s)  MV Peak A:        0.75 m/s   (0.42-0.7 m/s)  E/A Ratio:        1.13       (1.0-2.2)  MV e'             0.12 m/s   (>8.0)  MV lateral e'     0.12 m/s  MV medial e'      0.12 m/s  E/e' Ratio:       7.05       (<8.0)  PulmV Sys Steve:    54.68 cm/s  PulmV Edge Steve:   65.73 cm/s  PulmV S/D Steve:    0.83  PulmV A Revs Steve: 25.10 cm/s  PulmV A Revs Dur: 74.22 msec    MITRAL VALVE:                  Normal Ranges:  MV DT: 174 msec (150-240msec)    AORTIC VALVE:                          Normal Ranges:  AoV Vmax:      1.17 m/s (<=1.7m/s)  AoV Peak P.5 mmHg (<20mmHg)  LVOT Max Steve:  0.89 m/s (<=1.1m/s)  LVOT VTI:      18.54 cm  LVOT Diameter: 1.82 cm  (1.8-2.4cm)  AoV Area,Vmax: 1.99 cm2 (2.5-4.5cm2)       RIGHT VENTRICLE:  RV Basal 3.50 cm  RV Mid   2.10 cm  RV Major 5.7 cm  TAPSE:   19.0 mm  RV s'    0.13 m/s    TRICUSPID VALVE/RVSP:                              Normal Ranges:  Peak TR Velocity: 1.93 m/s  Est. RA Pressure: 3 mmHg  RV Syst Pressure:  17.8 mmHg (< 30mmHg)  IVC Diam:         1.50 cm    PULMONIC VALVE:                          Normal Ranges:  PV Accel Time: 157 msec (>120ms)  PV Max Steve:    0.7 m/s  (0.6-0.9m/s)  PV Max P.0 mmHg    Pulmonary Veins:  PulmV A Revs Dur: 74.22 msec  PulmV A Revs Steve: 25.10 cm/s  PulmV Edge Steve:   65.73 cm/s  PulmV S/D Steve:    0.83  PulmV Sys Steve:    54.68 cm/s    AORTA:  Asc Ao Diam 2.97 cm       [id number] Ceci Rawls MD  Electronically signed on 2024 at 3:53:12 PM       ** Final **       BI mammo bilateral screening tomosynthesis 2023    Narrative  Interpreted By:  ENZO MANUEL MD  MRN: [id number]  Patient Name: HUNTER VELA    STUDY:  DIGITAL MAMM SCREENING W/ RUBI;  2023 8:00 am    ACCESSION NUMBER(S):  [id number]    ORDERING CLINICIAN:  KATIE HINOJOSA    INDICATION:  Screening.    COMPARISON:  2021, 2020, 2019    FINDINGS:  2D and tomosynthesis images were reviewed at 1 mm slice thickness.    There are areas of scattered fibroglandular tissue.  There are 2  adjacent irregular spiculated equal density masses in the upper outer  left breast at middle depth. No suspicious masses or calcifications  are identified in the right breast.    Impression  No mammographic evidence of malignancy in the right breast. Left  breast mass x2.    BI-RADS CATEGORY:    Category: 0 - Incomplete; Need Additional Imaging Evaluation and/or  Prior Mammograms for Comparison.  Recommendation: Ultrasound Recommended.    For any future breast imaging appointments, please call [phone]).    Patient letter sent SADEVAL          Assessment/Plan:    68-year-old female previously healthy found to have on screening mammogram 2 adjacent irregular spiculated masses in the upper outer left breast at middle depth with  US showing a 5mm mass with left axilla showing 4 negative LN s/p  guided core needle  invasive lobular carcinoma, grade 2 with  ER 95%, HI 20%, HER2 positive 3+, also had lobular  carcinoma in situ. Dr. Blanchard performed left partial mastectomy with sentinel  lymph node biopsy (0/1) 2 foci of cancer largest 1 measuring 13 mm, second 1 measuring 7 mm, margins were negative final stage PT1CN0. She is presenting to discuss adjuvant treatment options.      I reviewed with her the events that led to her diagnosis of breast cancer. We reviewed all the procedures and diagnostic imaging she underwent thus far. I discussed the features of her breast cancer that include the size, grade, lymph node status and  hormone receptor/ her2-ying status.     Based on APT trial recommended to proceed with THx12 weeks followed by radiation followed by hormonal therapy and herceptin to complete for a total of 1 year     Based on the updated 10 year results of the APT trial published in the lancet in March of this year:      410 patients were enrolled and 406 were given adjuvant paclitaxel and trastuzumab and included in the analysis. Mean age at enrolment was 55 years (SD 10·5), 405 (99·8%) of 406 patients were female and one (0·2%) was male, 350 (86·2%) were  White, 28 (6·9%) were Black or , and 272 (67·0%) had hormone receptor-positive disease. After a median follow-up of 10·8 years (IQR 7·1-11·4), among 406 patients included in the analysis population, we observed 31  invasive disease-free survival events, of which six (19·4%) were locoregional ipsilateral recurrences, nine (29·0%) were new contralateral breast cancers, six (19·4%) were distant recurrences, and ten (32·3%) were all-cause deaths.  10-year invasive disease-free survival was 91·3% (95% CI 88·3-94·4), 10-year recurrence-free interval was 96·3% (95% CI 94·3-98·3), 10-year overall survival was 94·3% (95% CI 91·8-96·8), and 10-year breast  cancer-specific survival was 98·8% (95% CI 97·6-100)    on 01/02/2024: completed TH-     On 02/20/2024: completed radiation therapy    Echo showed 12% Decrease In EF  Holding herceptin for now and has  fup with Dr Osuna   Starting on losartan 25mg by Dr Christina- repeat echo in August Okay to resume herceptin -   Continue exemestane, Ca/vit D  Due for mammogram next Monday 06/24/2024    RTC I 09/10/2024      At least 35 minutes of direct consultation was spent reviewing the patient's chart as well as discussing and  reviewing the  cancer care plan including educating and answering  questions and concerns, greater than 50 percent spent in counseling and coordination  of care.            Cony Amin MD  Hematology and Medical Oncology  [facility]

## 2024-06-24 ENCOUNTER — OFFICE VISIT (OUTPATIENT)
Dept: SURGICAL ONCOLOGY | Facility: HOSPITAL | Age: 69
End: 2024-06-24
Payer: MEDICARE

## 2024-06-24 ENCOUNTER — HOSPITAL ENCOUNTER (OUTPATIENT)
Dept: RADIOLOGY | Facility: HOSPITAL | Age: 69
Discharge: HOME | End: 2024-06-24
Payer: MEDICARE

## 2024-06-24 VITALS
BODY MASS INDEX: 25.8 KG/M2 | HEIGHT: 59 IN | HEART RATE: 76 BPM | DIASTOLIC BLOOD PRESSURE: 67 MMHG | WEIGHT: 128 LBS | SYSTOLIC BLOOD PRESSURE: 150 MMHG

## 2024-06-24 DIAGNOSIS — Z17.0 ESTROGEN RECEPTOR POSITIVE STATUS (ER+): ICD-10-CM

## 2024-06-24 DIAGNOSIS — C50.412 MALIGNANT NEOPLASM OF UPPER-OUTER QUADRANT OF LEFT BREAST IN FEMALE, ESTROGEN RECEPTOR POSITIVE (MULTI): ICD-10-CM

## 2024-06-24 DIAGNOSIS — C50.412 MALIGNANT NEOPLASM OF UPPER-OUTER QUADRANT OF LEFT FEMALE BREAST (MULTI): ICD-10-CM

## 2024-06-24 DIAGNOSIS — Z17.0 MALIGNANT NEOPLASM OF UPPER-OUTER QUADRANT OF LEFT BREAST IN FEMALE, ESTROGEN RECEPTOR POSITIVE (MULTI): ICD-10-CM

## 2024-06-24 PROCEDURE — G0279 TOMOSYNTHESIS, MAMMO: HCPCS | Performed by: RADIOLOGY

## 2024-06-24 PROCEDURE — 99213 OFFICE O/P EST LOW 20 MIN: CPT | Performed by: SURGERY

## 2024-06-24 PROCEDURE — 1159F MED LIST DOCD IN RCRD: CPT | Performed by: SURGERY

## 2024-06-24 PROCEDURE — 77062 BREAST TOMOSYNTHESIS BI: CPT

## 2024-06-24 PROCEDURE — 1036F TOBACCO NON-USER: CPT | Performed by: SURGERY

## 2024-06-24 PROCEDURE — 77066 DX MAMMO INCL CAD BI: CPT | Performed by: RADIOLOGY

## 2024-06-24 NOTE — PROGRESS NOTES
BREAST SURGICAL ONCOLOGY FOLLOW UP     Assessment/Plan     left breast ILC g2 ER 95% MT 20% HER2+ at 1:00 4cmFN measuring 1.3cm and 0.7cm on final pathology   -pT1c(m)N0Mx stage: IA    Clinical breast exam and mammogram today are normal.  There is no evidence of local recurrence.  Some bogginess of the breat issue UOQ related to XRT.      Continue follow up with medical oncology as scheduled.    Will follow up in the breast center with my nurse practitioner partner in 1 year at the time of mammogram or sooner if there are any breast concerns.  I will see Zina  on an as needed basis for any concerns.    Subjective   Zina Hernandez is a 68 y.o. female presents today for follow up carcinoma of the left breast.     Treatment history  Surgery: 8/30/2023 left MS PM slnb (0/1)  Radiation: completed XRT 2/20/2024.  Systemic therapy: adjuvant taxol herceptin, herceptin (last dose 10/1/2024) and exemestane now.     Noticing swelling in her hand and feet in the AM but resolves as she moves throughout the day.    Bilateral mammogram today: BIRADS 2, benign.    Review of Systems   Constitutional symptoms: Denies generalized fatigue.  Denies weight change, fevers/chills, difficulty sleeping   Eyes: Denies double vision, glaucoma, cataracts.  Ear/nose/throat/mouth: Denies hearing changes, sore throat, sinus problems.  Cardiovascular: No chest pain.  Denies irregular heartbeat.  Denies ankle swelling.  Respiratory: No wheezing, cough, or shortness of breath.  Gastrointestinal: No abdominal pain,  No nausea/vomiting.  No indigestion/heartburn.  No change in bowel habits.  No constipation or diarrhea.   Genitourinary: No urinary incontinence.  No urinary frequency.  No painful urination.  Musculoskeletal: No bone pain, no muscle pain, no joint pain.   Integumentary: No rash. No masses.  No changes in moles.  No easy bruising.  Neurological: No headaches.  No tremors. No numbness/tingling.  Psychiatric: No anxiety. No  depression.  Endocrine: No excessive thirst.  Not too hot or too cold.  Not tired or fatigued.    Hematological/lymphatic: No swollen glands or blood clotting problems.  No bruising.    Objective   Physical Exam  General: Alert and oriented x 3.  Mood and affect are appropriate.  HEENT: EOMI, PERRLA.   Neck: supple, no masses, no cervical adenopathy.  Cardiovascular: no lower extremity edema.  Pulmonary: breathing non labored on room air.  GI: Abdomen soft, no masses. No hepatomegaly or splenomegaly.  Lymph nodes: No supraclavicular or axillary adenopathy bilaterally. Well healed left axillary incision.  Musculoskeletal: Full range of motion in the upper extremities bilaterally.  Neuro: denies dizziness, tremors    Breasts: The breasts were examined in both the seated and supine positions.    RIGHT: The nipple is everted without nipple discharge.  There are no skin changes, skin thickening, or dimpling. There are no masses palpated in the RIGHT breast.   LEFT: The nipple is everted without nipple discharge.  There are no skin changes, skin thickening, or dimpling. There are no masses palpated in the LEFT breast. Well healed periareolar incision.      Radiology review: All images and reports were personally reviewed.         Mirian Blanchard, DO

## 2024-07-09 ENCOUNTER — APPOINTMENT (OUTPATIENT)
Dept: HEMATOLOGY/ONCOLOGY | Facility: CLINIC | Age: 69
End: 2024-07-09
Payer: MEDICARE

## 2024-07-09 ENCOUNTER — INFUSION (OUTPATIENT)
Dept: HEMATOLOGY/ONCOLOGY | Facility: CLINIC | Age: 69
End: 2024-07-09
Payer: MEDICARE

## 2024-07-09 VITALS
HEART RATE: 79 BPM | TEMPERATURE: 97.3 F | WEIGHT: 128.53 LBS | SYSTOLIC BLOOD PRESSURE: 160 MMHG | DIASTOLIC BLOOD PRESSURE: 82 MMHG | OXYGEN SATURATION: 98 % | RESPIRATION RATE: 16 BRPM | BODY MASS INDEX: 25.96 KG/M2

## 2024-07-09 DIAGNOSIS — C50.912 INVASIVE LOBULAR CARCINOMA OF LEFT BREAST IN FEMALE (MULTI): ICD-10-CM

## 2024-07-09 PROCEDURE — 96413 CHEMO IV INFUSION 1 HR: CPT

## 2024-07-09 PROCEDURE — 2500000004 HC RX 250 GENERAL PHARMACY W/ HCPCS (ALT 636 FOR OP/ED): Mod: JG | Performed by: STUDENT IN AN ORGANIZED HEALTH CARE EDUCATION/TRAINING PROGRAM

## 2024-07-09 PROCEDURE — 2500000004 HC RX 250 GENERAL PHARMACY W/ HCPCS (ALT 636 FOR OP/ED): Performed by: STUDENT IN AN ORGANIZED HEALTH CARE EDUCATION/TRAINING PROGRAM

## 2024-07-09 RX ORDER — HEPARIN SODIUM,PORCINE/PF 10 UNIT/ML
50 SYRINGE (ML) INTRAVENOUS AS NEEDED
OUTPATIENT
Start: 2024-07-09

## 2024-07-09 RX ORDER — EPINEPHRINE 0.3 MG/.3ML
0.3 INJECTION SUBCUTANEOUS EVERY 5 MIN PRN
Status: DISCONTINUED | OUTPATIENT
Start: 2024-07-09 | End: 2024-07-09 | Stop reason: HOSPADM

## 2024-07-09 RX ORDER — HEPARIN 100 UNIT/ML
500 SYRINGE INTRAVENOUS AS NEEDED
OUTPATIENT
Start: 2024-07-09

## 2024-07-09 RX ORDER — ALBUTEROL SULFATE 0.83 MG/ML
3 SOLUTION RESPIRATORY (INHALATION) AS NEEDED
Status: DISCONTINUED | OUTPATIENT
Start: 2024-07-09 | End: 2024-07-09 | Stop reason: HOSPADM

## 2024-07-09 RX ORDER — FAMOTIDINE 10 MG/ML
20 INJECTION INTRAVENOUS ONCE AS NEEDED
Status: DISCONTINUED | OUTPATIENT
Start: 2024-07-09 | End: 2024-07-09 | Stop reason: HOSPADM

## 2024-07-09 RX ORDER — PROCHLORPERAZINE EDISYLATE 5 MG/ML
10 INJECTION INTRAMUSCULAR; INTRAVENOUS EVERY 6 HOURS PRN
Status: DISCONTINUED | OUTPATIENT
Start: 2024-07-09 | End: 2024-07-09 | Stop reason: HOSPADM

## 2024-07-09 RX ORDER — HEPARIN 100 UNIT/ML
500 SYRINGE INTRAVENOUS AS NEEDED
Status: DISCONTINUED | OUTPATIENT
Start: 2024-07-09 | End: 2024-07-09 | Stop reason: HOSPADM

## 2024-07-09 RX ORDER — DIPHENHYDRAMINE HYDROCHLORIDE 50 MG/ML
50 INJECTION INTRAMUSCULAR; INTRAVENOUS AS NEEDED
Status: DISCONTINUED | OUTPATIENT
Start: 2024-07-09 | End: 2024-07-09 | Stop reason: HOSPADM

## 2024-07-09 RX ORDER — PROCHLORPERAZINE MALEATE 10 MG
10 TABLET ORAL EVERY 6 HOURS PRN
Status: DISCONTINUED | OUTPATIENT
Start: 2024-07-09 | End: 2024-07-09 | Stop reason: HOSPADM

## 2024-07-09 ASSESSMENT — PAIN SCALES - GENERAL: PAINLEVEL: 0-NO PAIN

## 2024-07-09 NOTE — SIGNIFICANT EVENT

## 2024-07-30 ENCOUNTER — APPOINTMENT (OUTPATIENT)
Dept: HEMATOLOGY/ONCOLOGY | Facility: CLINIC | Age: 69
End: 2024-07-30
Payer: MEDICARE

## 2024-07-30 ENCOUNTER — INFUSION (OUTPATIENT)
Dept: HEMATOLOGY/ONCOLOGY | Facility: CLINIC | Age: 69
End: 2024-07-30
Payer: MEDICARE

## 2024-07-30 VITALS
RESPIRATION RATE: 18 BRPM | BODY MASS INDEX: 26.38 KG/M2 | WEIGHT: 130.62 LBS | OXYGEN SATURATION: 97 % | TEMPERATURE: 97.7 F | SYSTOLIC BLOOD PRESSURE: 147 MMHG | HEART RATE: 84 BPM | DIASTOLIC BLOOD PRESSURE: 81 MMHG

## 2024-07-30 DIAGNOSIS — C50.912 INVASIVE LOBULAR CARCINOMA OF LEFT BREAST IN FEMALE (MULTI): ICD-10-CM

## 2024-07-30 PROCEDURE — 96413 CHEMO IV INFUSION 1 HR: CPT

## 2024-07-30 PROCEDURE — 2500000004 HC RX 250 GENERAL PHARMACY W/ HCPCS (ALT 636 FOR OP/ED): Mod: JG | Performed by: STUDENT IN AN ORGANIZED HEALTH CARE EDUCATION/TRAINING PROGRAM

## 2024-07-30 PROCEDURE — 2500000004 HC RX 250 GENERAL PHARMACY W/ HCPCS (ALT 636 FOR OP/ED): Performed by: STUDENT IN AN ORGANIZED HEALTH CARE EDUCATION/TRAINING PROGRAM

## 2024-07-30 RX ORDER — ALBUTEROL SULFATE 0.83 MG/ML
3 SOLUTION RESPIRATORY (INHALATION) AS NEEDED
Status: DISCONTINUED | OUTPATIENT
Start: 2024-07-30 | End: 2024-07-30 | Stop reason: HOSPADM

## 2024-07-30 RX ORDER — DIPHENHYDRAMINE HYDROCHLORIDE 50 MG/ML
50 INJECTION INTRAMUSCULAR; INTRAVENOUS AS NEEDED
Status: DISCONTINUED | OUTPATIENT
Start: 2024-07-30 | End: 2024-07-30 | Stop reason: HOSPADM

## 2024-07-30 RX ORDER — FAMOTIDINE 10 MG/ML
20 INJECTION INTRAVENOUS ONCE AS NEEDED
Status: DISCONTINUED | OUTPATIENT
Start: 2024-07-30 | End: 2024-07-30 | Stop reason: HOSPADM

## 2024-07-30 RX ORDER — HEPARIN 100 UNIT/ML
500 SYRINGE INTRAVENOUS AS NEEDED
OUTPATIENT
Start: 2024-07-30

## 2024-07-30 RX ORDER — EPINEPHRINE 0.3 MG/.3ML
0.3 INJECTION SUBCUTANEOUS EVERY 5 MIN PRN
Status: DISCONTINUED | OUTPATIENT
Start: 2024-07-30 | End: 2024-07-30 | Stop reason: HOSPADM

## 2024-07-30 RX ORDER — PROCHLORPERAZINE EDISYLATE 5 MG/ML
10 INJECTION INTRAMUSCULAR; INTRAVENOUS EVERY 6 HOURS PRN
Status: DISCONTINUED | OUTPATIENT
Start: 2024-07-30 | End: 2024-07-30 | Stop reason: HOSPADM

## 2024-07-30 RX ORDER — HEPARIN 100 UNIT/ML
500 SYRINGE INTRAVENOUS AS NEEDED
Status: DISCONTINUED | OUTPATIENT
Start: 2024-07-30 | End: 2024-07-30 | Stop reason: HOSPADM

## 2024-07-30 RX ORDER — PROCHLORPERAZINE MALEATE 10 MG
10 TABLET ORAL EVERY 6 HOURS PRN
Status: DISCONTINUED | OUTPATIENT
Start: 2024-07-30 | End: 2024-07-30 | Stop reason: HOSPADM

## 2024-07-30 RX ORDER — HEPARIN SODIUM,PORCINE/PF 10 UNIT/ML
50 SYRINGE (ML) INTRAVENOUS AS NEEDED
OUTPATIENT
Start: 2024-07-30

## 2024-07-30 ASSESSMENT — PAIN SCALES - GENERAL: PAINLEVEL: 0-NO PAIN

## 2024-07-30 NOTE — SIGNIFICANT EVENT

## 2024-08-09 DIAGNOSIS — Z17.0 MALIGNANT NEOPLASM OF UPPER-OUTER QUADRANT OF LEFT BREAST IN FEMALE, ESTROGEN RECEPTOR POSITIVE (MULTI): ICD-10-CM

## 2024-08-09 DIAGNOSIS — C50.412 MALIGNANT NEOPLASM OF UPPER-OUTER QUADRANT OF LEFT BREAST IN FEMALE, ESTROGEN RECEPTOR POSITIVE (MULTI): ICD-10-CM

## 2024-08-09 RX ORDER — EXEMESTANE 25 MG/1
25 TABLET ORAL DAILY
Qty: 30 TABLET | Refills: 3 | Status: CANCELLED | OUTPATIENT
Start: 2024-08-09 | End: 2025-08-09

## 2024-08-09 RX ORDER — EXEMESTANE 25 MG/1
25 TABLET ORAL DAILY
Qty: 30 TABLET | Refills: 3 | Status: SHIPPED | OUTPATIENT
Start: 2024-08-09 | End: 2025-08-09

## 2024-08-16 NOTE — SIGNIFICANT EVENT
11/07/23 0936   Prechemo Checklist   Has the patient been in the hospital, ED, or urgent care since last date of service No   Chemo/Immuno Consent Signed Yes   Protocol/Indications Verified Yes   Confirmed to previous date/time of medication Yes   Compared to previous dose Yes   All medications are dated accurately Yes   Pregnancy Test Negative Not applicable   Parameters Met Yes   BSA/Weight-Height Verified Yes   Dose Calculations Verified Yes         67 year old female pmhx of HTN, HLD, GERD, s/p repair for esophageal rupture presents to ER c/o abdominal pain, nausea and dark vomiting    Nausea with Dark vomitus and abdominal pain,  Eval for  upper GI bleed   H/O esophageal rupture s/p repair November 2023 at [facility]   Hemodynamically stable with Normal hemoglobin levels   CT Chest/ Abdomen and Pelvis: unremarkable   Had Colonoscopy less than 5rs ago: poor prep with polyps found   Continue with PPI infusion and Keep active T/S I   Still with abdominal pain and nausea  today   -Keep on Clears Until Eval by GI    Hypertension + HLD: C/W Amlodipine, Labetalol and  atorvastatin       DVT PPX:  SCD's   GI PPX: PPI  Code status: FULL CODE  Dispo: from Home   pending GI Eval     67 year old female pmhx of HTN, HLD, GERD, s/p repair for esophageal rupture presents to ER c/o abdominal pain, nausea and dark vomiting    Nausea with Dark vomitus and abdominal pain,  Eval for  upper GI bleed   H/O esophageal rupture s/p repair November 2023 at [facility]   Hemodynamically stable with Normal hemoglobin levels   CT Chest/ Abdomen and Pelvis: unremarkable, UA negative   Had Colonoscopy less than 5rs ago: poor prep with polyps found   Continue with PPI infusion and Keep active T/S I   Still with abdominal pain and nausea  today   -Keep on Clears Until Eval by GI    Hypertension + HLD: C/W Amlodipine, Labetalol and  atorvastatin       DVT PPX:  SCD's   GI PPX: PPI  Code status: FULL CODE  Dispo: from Home   pending GI Eval

## 2024-08-20 ENCOUNTER — INFUSION (OUTPATIENT)
Dept: HEMATOLOGY/ONCOLOGY | Facility: CLINIC | Age: 69
End: 2024-08-20
Payer: MEDICARE

## 2024-08-20 VITALS
WEIGHT: 130.07 LBS | DIASTOLIC BLOOD PRESSURE: 81 MMHG | RESPIRATION RATE: 15 BRPM | BODY MASS INDEX: 26.27 KG/M2 | SYSTOLIC BLOOD PRESSURE: 153 MMHG | HEART RATE: 79 BPM | TEMPERATURE: 98.8 F | OXYGEN SATURATION: 98 %

## 2024-08-20 DIAGNOSIS — C50.912 INVASIVE LOBULAR CARCINOMA OF LEFT BREAST IN FEMALE (MULTI): ICD-10-CM

## 2024-08-20 PROCEDURE — 96413 CHEMO IV INFUSION 1 HR: CPT

## 2024-08-20 PROCEDURE — 2500000004 HC RX 250 GENERAL PHARMACY W/ HCPCS (ALT 636 FOR OP/ED): Performed by: STUDENT IN AN ORGANIZED HEALTH CARE EDUCATION/TRAINING PROGRAM

## 2024-08-20 RX ORDER — HEPARIN 100 UNIT/ML
500 SYRINGE INTRAVENOUS AS NEEDED
Status: DISCONTINUED | OUTPATIENT
Start: 2024-08-20 | End: 2024-08-20 | Stop reason: HOSPADM

## 2024-08-20 RX ORDER — EPINEPHRINE 0.3 MG/.3ML
0.3 INJECTION SUBCUTANEOUS EVERY 5 MIN PRN
Status: DISCONTINUED | OUTPATIENT
Start: 2024-08-20 | End: 2024-08-20 | Stop reason: HOSPADM

## 2024-08-20 RX ORDER — DIPHENHYDRAMINE HYDROCHLORIDE 50 MG/ML
50 INJECTION INTRAMUSCULAR; INTRAVENOUS AS NEEDED
Status: DISCONTINUED | OUTPATIENT
Start: 2024-08-20 | End: 2024-08-20 | Stop reason: HOSPADM

## 2024-08-20 RX ORDER — ALBUTEROL SULFATE 0.83 MG/ML
3 SOLUTION RESPIRATORY (INHALATION) AS NEEDED
Status: DISCONTINUED | OUTPATIENT
Start: 2024-08-20 | End: 2024-08-20 | Stop reason: HOSPADM

## 2024-08-20 RX ORDER — HEPARIN 100 UNIT/ML
500 SYRINGE INTRAVENOUS AS NEEDED
OUTPATIENT
Start: 2024-08-20

## 2024-08-20 RX ORDER — FAMOTIDINE 10 MG/ML
20 INJECTION INTRAVENOUS ONCE AS NEEDED
Status: DISCONTINUED | OUTPATIENT
Start: 2024-08-20 | End: 2024-08-20 | Stop reason: HOSPADM

## 2024-08-20 RX ORDER — PROCHLORPERAZINE EDISYLATE 5 MG/ML
10 INJECTION INTRAMUSCULAR; INTRAVENOUS EVERY 6 HOURS PRN
Status: DISCONTINUED | OUTPATIENT
Start: 2024-08-20 | End: 2024-08-20 | Stop reason: HOSPADM

## 2024-08-20 RX ORDER — HEPARIN SODIUM,PORCINE/PF 10 UNIT/ML
50 SYRINGE (ML) INTRAVENOUS AS NEEDED
OUTPATIENT
Start: 2024-08-20

## 2024-08-20 RX ORDER — PROCHLORPERAZINE MALEATE 10 MG
10 TABLET ORAL EVERY 6 HOURS PRN
Status: DISCONTINUED | OUTPATIENT
Start: 2024-08-20 | End: 2024-08-20 | Stop reason: HOSPADM

## 2024-08-20 ASSESSMENT — PAIN SCALES - GENERAL: PAINLEVEL: 0-NO PAIN

## 2024-08-21 ENCOUNTER — HOSPITAL ENCOUNTER (OUTPATIENT)
Dept: CARDIOLOGY | Facility: CLINIC | Age: 69
Discharge: HOME | End: 2024-08-21
Payer: MEDICARE

## 2024-08-21 DIAGNOSIS — Z51.81 ENCOUNTER FOR MONITORING CARDIOTOXIC DRUG THERAPY: ICD-10-CM

## 2024-08-21 DIAGNOSIS — C50.912 INVASIVE LOBULAR CARCINOMA OF LEFT BREAST IN FEMALE (MULTI): ICD-10-CM

## 2024-08-21 DIAGNOSIS — Z79.899 ENCOUNTER FOR MONITORING CARDIOTOXIC DRUG THERAPY: ICD-10-CM

## 2024-08-21 PROCEDURE — 76376 3D RENDER W/INTRP POSTPROCES: CPT

## 2024-08-21 PROCEDURE — 93356 MYOCRD STRAIN IMG SPCKL TRCK: CPT | Performed by: STUDENT IN AN ORGANIZED HEALTH CARE EDUCATION/TRAINING PROGRAM

## 2024-08-21 PROCEDURE — 76376 3D RENDER W/INTRP POSTPROCES: CPT | Performed by: STUDENT IN AN ORGANIZED HEALTH CARE EDUCATION/TRAINING PROGRAM

## 2024-08-21 PROCEDURE — 93306 TTE W/DOPPLER COMPLETE: CPT | Performed by: STUDENT IN AN ORGANIZED HEALTH CARE EDUCATION/TRAINING PROGRAM

## 2024-08-23 LAB
AORTIC VALVE PEAK VELOCITY: 0.95 M/S
AV PEAK GRADIENT: 3.6 MMHG
AVA (PEAK VEL): 2.82 CM2
EJECTION FRACTION APICAL 4 CHAMBER: 64
EJECTION FRACTION: 63 %
LEFT ATRIUM VOLUME AREA LENGTH INDEX BSA: 27.9 ML/M2
LEFT VENTRICLE INTERNAL DIMENSION DIASTOLE: 3.69 CM (ref 3.5–6)
LEFT VENTRICULAR OUTFLOW TRACT DIAMETER: 1.94 CM
MITRAL VALVE E/A RATIO: 1.2
RIGHT VENTRICLE FREE WALL PEAK S': 12 CM/S
RIGHT VENTRICLE PEAK SYSTOLIC PRESSURE: 21.5 MMHG
TRICUSPID ANNULAR PLANE SYSTOLIC EXCURSION: 2.3 CM

## 2024-08-30 ENCOUNTER — APPOINTMENT (OUTPATIENT)
Dept: CARDIOLOGY | Facility: CLINIC | Age: 69
End: 2024-08-30
Payer: MEDICARE

## 2024-08-30 VITALS
HEIGHT: 59 IN | HEART RATE: 93 BPM | WEIGHT: 129 LBS | BODY MASS INDEX: 26 KG/M2 | SYSTOLIC BLOOD PRESSURE: 120 MMHG | DIASTOLIC BLOOD PRESSURE: 82 MMHG

## 2024-08-30 DIAGNOSIS — Z51.81 ENCOUNTER FOR MONITORING CARDIOTOXIC DRUG THERAPY: Primary | ICD-10-CM

## 2024-08-30 DIAGNOSIS — Z78.9 NEVER SMOKED ANY SUBSTANCE: ICD-10-CM

## 2024-08-30 DIAGNOSIS — C50.912 INVASIVE LOBULAR CARCINOMA OF LEFT BREAST IN FEMALE (MULTI): ICD-10-CM

## 2024-08-30 DIAGNOSIS — Z17.0 MALIGNANT NEOPLASM OF UPPER-OUTER QUADRANT OF LEFT BREAST IN FEMALE, ESTROGEN RECEPTOR POSITIVE (MULTI): ICD-10-CM

## 2024-08-30 DIAGNOSIS — C50.412 MALIGNANT NEOPLASM OF UPPER-OUTER QUADRANT OF LEFT BREAST IN FEMALE, ESTROGEN RECEPTOR POSITIVE (MULTI): ICD-10-CM

## 2024-08-30 DIAGNOSIS — Z79.899 ENCOUNTER FOR MONITORING CARDIOTOXIC DRUG THERAPY: Primary | ICD-10-CM

## 2024-08-30 DIAGNOSIS — Z92.3 HISTORY OF THORACIC IRRADIATION: ICD-10-CM

## 2024-08-30 PROCEDURE — 1159F MED LIST DOCD IN RCRD: CPT | Performed by: INTERNAL MEDICINE

## 2024-08-30 PROCEDURE — 1036F TOBACCO NON-USER: CPT | Performed by: INTERNAL MEDICINE

## 2024-08-30 PROCEDURE — 99214 OFFICE O/P EST MOD 30 MIN: CPT | Performed by: INTERNAL MEDICINE

## 2024-08-30 PROCEDURE — 3008F BODY MASS INDEX DOCD: CPT | Performed by: INTERNAL MEDICINE

## 2024-08-30 PROCEDURE — 1160F RVW MEDS BY RX/DR IN RCRD: CPT | Performed by: INTERNAL MEDICINE

## 2024-08-30 ASSESSMENT — ENCOUNTER SYMPTOMS
NEUROLOGICAL NEGATIVE: 1
PALPITATIONS: 1
CONSTITUTIONAL NEGATIVE: 1
ARTHRALGIAS: 1

## 2024-08-30 NOTE — PROGRESS NOTES
Patient:  Zina Hernandez  YOB: 1955  MRN: [id number]       Chief Complaint/Active Symptoms:       Zina Hernandez is a 68 y.o. female who returns today for cardiac follow-up.    The patient is here for 3-month follow-up after her latest echocardiogram.  She is doing well from a heart standpoint and has no chest pain, shortness of breath, dizziness or lightheadedness or orthopnea or PND.  There is no lower extremity edema.  She does have an occasional sensation of a fluttering in her left lateral chest but no dizziness or lightheadedness and no awareness of any sustained or irregular heartbeats.    She has already reviewed the results of her echocardiogram online and we reviewed the results and answered any questions the patient had today.  She switched her losartan to the evening as she was having problems with dizziness and lightheadedness when she took it in the morning and she has gotten used to the medication and is tolerating it without any dizziness or lightheadedness at this time.    Cancer Diagnosis: Left breast cancer, ER=, PR20%+, HER2+  Oncologist: Dr. Cony Amin  Treatment:  Paclitaxel and Herceptin, lumpectomy in August 2023.  To continue with Herceptin through October 2024 after radiation therapy. Now on exemestane.  herceptin every 3 weeks -2 treatments to go  Radiation: 4256 cGY (16 of 16 fractions) to left chest and axilla.   Risk factors: Age    Review of Systems   Constitutional: Negative.    Cardiovascular:  Positive for palpitations. Negative for chest pain and leg swelling.   Genitourinary: Negative.    Musculoskeletal:  Positive for arthralgias.   Neurological: Negative.    All other systems reviewed and are negative.      Objective:     Vitals:    08/30/24 1153   BP: 120/82   Pulse: 93       Vitals:    08/30/24 1153   Weight: 58.5 kg (129 lb)       Allergies:     Allergies   Allergen Reactions    Paclitaxel Other     Back pain and chest pain.     Tamiflu [Oseltamivir]  Unknown          Medications:     Current Outpatient Medications   Medication Instructions    exemestane (AROMASIN) 25 mg, oral, Daily, Take after a meal.  Try to take at the same time each day.    loperamide (IMODIUM A-D) 2 mg, oral    losartan (COZAAR) 25 mg, oral, Daily       Physical Examination:   GENERAL:  Well developed, well nourished, in no acute distress.  HEENT: NC AT, EOMI with anicteric sclera  NECK:  Supple, no JVD, no bruit.  LUNGS:  Clear to auscultation bilaterally, normal respiratory effort.  HEART:  PMI is nondisplaced. RRR with normal S1 and S2, no S3, no mumur or rub. No carotid or abdominal bruits  ABDOMEN: Soft, NT, ND without palpable organomegaly or bruits  EXTREMITIES:  Warm with good color, no clubbing or cyanosis.  There is no edema noted.  PERIPHERAL VASCULAR:  Pulses present and equally palpable; 2+ throughout.  MUSCULOSKELETAL: No significant joint or limb deformity  NEURO/PSYCH:  Alert and oriented times three with approppriate behavior and responses. Nonfocal motor examination with normal gait and ambulation  Skin: no rash or lesions on exposed skin or reported.    Lab:     CBC:   Lab Results   Component Value Date    WBC 3.9 (L) 05/29/2024    RBC 4.48 05/29/2024    HGB 12.6 05/29/2024    HCT 39.1 05/29/2024     05/29/2024        CMP:    Lab Results   Component Value Date     05/29/2024    K 4.2 05/29/2024     05/29/2024    CO2 31 05/29/2024    BUN 16 05/29/2024    CREATININE 1.12 (H) 05/29/2024    GLUCOSE 89 05/29/2024    CALCIUM 9.6 05/29/2024       Magnesium:    Lab Results   Component Value Date    MG 2.00 03/16/2022       Lipid Profile:    Lab Results   Component Value Date    TRIG 98 05/29/2024    HDL 66.3 05/29/2024    LDLCALC 80 05/29/2024       TSH:    Lab Results   Component Value Date    TSH 5.84 (H) 05/29/2024       BNP:   Lab Results   Component Value Date    BNP 7 05/07/2024        PT/INR:    Lab Results   Component Value Date    PROTIME 11.7  "10/11/2023    INR 1.0 10/11/2023       HgBA1c:    No results found for: \"HGBA1C\"    BMP:  Lab Results   Component Value Date     05/29/2024     02/13/2024     01/02/2024    K 4.2 05/29/2024    K 3.7 02/13/2024    K 3.7 01/02/2024     05/29/2024     02/13/2024     01/02/2024    CO2 31 05/29/2024    CO2 28 02/13/2024    CO2 26 01/02/2024    BUN 16 05/29/2024    BUN 14 02/13/2024    BUN 23 01/02/2024    CREATININE 1.12 (H) 05/29/2024    CREATININE 0.89 02/13/2024    CREATININE 0.84 01/02/2024       Cardiac Enzymes:    Lab Results   Component Value Date    TROPHS <3 05/29/2024    TROPHS <3 05/07/2024    TROPHS <3 01/15/2024       Hepatic Function Panel:    Lab Results   Component Value Date    ALKPHOS 67 05/29/2024    ALT 18 05/29/2024    AST 23 05/29/2024    PROT 7.0 05/29/2024    BILITOT 0.7 05/29/2024         Diagnostic Studies:     No echocardiogram results found for the past 12 months  Echocardiogram from getting of August was reviewed with the patient showed normal LV function and mildly or borderline reduced strain showing a mild decrease from her previous study.    Radiology:     No orders to display   Most recent imaging was reviewed    ASSESSMENT     Problem List Items Addressed This Visit       Invasive lobular carcinoma of left breast in female (Multi)    Malignant neoplasm of upper-outer quadrant of left breast in female, estrogen receptor positive (Multi)    Encounter for monitoring cardiotoxic drug therapy - Primary    Never smoked any substance    History of thoracic irradiation       PLAN   1.  Left breast cancer with cardiotoxic drug therapy and history of thoracic radiation.  The patient is stable without any signs or symptoms of congestive heart failure.  Her ejection fraction remains relatively unchanged but her strength continues to go back and forth between normal and mildly reduced.  This is despite the fact that she is on what ever dose of cardioprotective " medication we can get with only losartan 25 mg daily.  There have been no signs or symptoms of congestive heart failure clinically.  The patient had some questions about why the strain goes up and down and back up and then back down again.  As there is no comment on this 1 is unknown whether sometimes the quality of the strain imaging obtained is unreliable.  I do not believe that the patient's heart functioning is improving worsening improving and worsening again and the patient was reassured by that.    I will see her back in the office in follow-up after her next limited echocardiogram that is scheduled for November after she completes her last dose of treatment.  If that shows no changes we will then see her in a year to follow-up her therapy and history of chest radiation.  We have explained to the patient even if her heart function is normal there are no plans for immediate discontinuation of her losartan therapy unless she develops problems and intolerance to the medication.

## 2024-09-09 ENCOUNTER — APPOINTMENT (OUTPATIENT)
Dept: OBSTETRICS AND GYNECOLOGY | Facility: CLINIC | Age: 69
End: 2024-09-09
Payer: MEDICARE

## 2024-09-09 VITALS
HEIGHT: 59 IN | BODY MASS INDEX: 26.13 KG/M2 | HEART RATE: 70 BPM | OXYGEN SATURATION: 98 % | DIASTOLIC BLOOD PRESSURE: 79 MMHG | WEIGHT: 129.6 LBS | SYSTOLIC BLOOD PRESSURE: 148 MMHG

## 2024-09-09 DIAGNOSIS — Z01.419 ENCOUNTER FOR ANNUAL ROUTINE GYNECOLOGICAL EXAMINATION: Primary | ICD-10-CM

## 2024-09-09 PROCEDURE — 1160F RVW MEDS BY RX/DR IN RCRD: CPT | Performed by: OBSTETRICS & GYNECOLOGY

## 2024-09-09 PROCEDURE — 99397 PER PM REEVAL EST PAT 65+ YR: CPT | Performed by: OBSTETRICS & GYNECOLOGY

## 2024-09-09 PROCEDURE — 1159F MED LIST DOCD IN RCRD: CPT | Performed by: OBSTETRICS & GYNECOLOGY

## 2024-09-09 PROCEDURE — 1126F AMNT PAIN NOTED NONE PRSNT: CPT | Performed by: OBSTETRICS & GYNECOLOGY

## 2024-09-09 PROCEDURE — 3008F BODY MASS INDEX DOCD: CPT | Performed by: OBSTETRICS & GYNECOLOGY

## 2024-09-09 PROCEDURE — 1036F TOBACCO NON-USER: CPT | Performed by: OBSTETRICS & GYNECOLOGY

## 2024-09-09 ASSESSMENT — PAIN SCALES - GENERAL: PAINLEVEL: 0-NO PAIN

## 2024-09-09 NOTE — PROGRESS NOTES
"Annual Exam     Pap: 5/3/2023 nml HPV-  Monica: 2024 Cat 2 - Breast Surgery   LMP: postmenopausal   CC: None     Odilia Ramos MA II     GYN ANNUAL EXAM    SUBJECTIVE    Zina Hernandez is a 68 y.o. female who presents for annual exam today.  She has no complaints today.      She was diagnosed with breast cancer last year and had a partial left mastectomy, radiation and has been getting herceptin.  She has 2 infusions left.      She has had pain with intercourse and is now using lubricant which helps.     PMH - h/o breast cancer      PSH - left breast lumpectomy     OB history -   OB History    Para Term  AB Living   2 2 2         SAB IAB Ectopic Multiple Live Births                  # Outcome Date GA Lbr Lamine/2nd Weight Sex Type Anes PTL Lv   2 Term            1 Term              Last pap -   Normal HPV Negative-      Family history of breast or ovarian cancer - none     OBJECTIVE  /79 (BP Location: Right arm, Patient Position: Sitting)   Pulse 70   Ht 1.499 m (4' 11\")   Wt 58.8 kg (129 lb 9.6 oz)   SpO2 98%   BMI 26.18 kg/m²     General Appearance   - consistent with stated age, well groomed and cooperative    Integumentary  - skin warm and dry without rash    Head and Neck  - normalocephalic and neck supple    Chest and Lung Exam  - normal breathing effort, no respiratory distress    Breast  - symmetry noted, no mass palpable, no skin change and no nipple discharge.    Abdomen  - soft, nontender and no hepatomegaly, splenomegaly, or mass    Female Genitourinary  - vulva normal without rash or lesion, normal vaginal rugae, no vaginal discharge, uterus normal size & no palpable masses, no adnexal mass, no adnexal tenderness, no cervical motion tenderness    Peripheral Vascular  - no edema present    ASSESSMENT/PLAN  68 y.o.  female who presents for annual exam.       Actions performed during this visit include:  - Clinical breast exam  - Clinical pelvic exam  - Pap- UTD and not " indicated   - Mammogram- breast imaging per breast given breast cancer     Please return for your next visit in 1 year or sooner as needed.     Nuris Suarez MD

## 2024-09-10 ENCOUNTER — APPOINTMENT (OUTPATIENT)
Dept: HEMATOLOGY/ONCOLOGY | Facility: CLINIC | Age: 69
End: 2024-09-10
Payer: MEDICARE

## 2024-09-10 ENCOUNTER — INFUSION (OUTPATIENT)
Dept: HEMATOLOGY/ONCOLOGY | Facility: CLINIC | Age: 69
End: 2024-09-10
Payer: MEDICARE

## 2024-09-10 ENCOUNTER — LAB (OUTPATIENT)
Dept: LAB | Facility: CLINIC | Age: 69
End: 2024-09-10
Payer: MEDICARE

## 2024-09-10 ENCOUNTER — OFFICE VISIT (OUTPATIENT)
Dept: HEMATOLOGY/ONCOLOGY | Facility: CLINIC | Age: 69
End: 2024-09-10
Payer: MEDICARE

## 2024-09-10 VITALS
DIASTOLIC BLOOD PRESSURE: 75 MMHG | OXYGEN SATURATION: 97 % | SYSTOLIC BLOOD PRESSURE: 167 MMHG | WEIGHT: 129.7 LBS | HEART RATE: 85 BPM | RESPIRATION RATE: 16 BRPM | TEMPERATURE: 98.6 F | BODY MASS INDEX: 26.2 KG/M2

## 2024-09-10 DIAGNOSIS — C50.912 INVASIVE LOBULAR CARCINOMA OF LEFT BREAST IN FEMALE (MULTI): ICD-10-CM

## 2024-09-10 LAB
ALBUMIN SERPL BCP-MCNC: 4.3 G/DL (ref 3.4–5)
ALP SERPL-CCNC: 86 U/L (ref 33–136)
ALT SERPL W P-5'-P-CCNC: 17 U/L (ref 7–45)
ANION GAP SERPL CALC-SCNC: 12 MMOL/L (ref 10–20)
AST SERPL W P-5'-P-CCNC: 23 U/L (ref 9–39)
BASOPHILS # BLD AUTO: 0.02 X10*3/UL (ref 0–0.1)
BASOPHILS NFR BLD AUTO: 0.4 %
BILIRUB SERPL-MCNC: 0.4 MG/DL (ref 0–1.2)
BUN SERPL-MCNC: 19 MG/DL (ref 6–23)
CALCIUM SERPL-MCNC: 10.2 MG/DL (ref 8.6–10.3)
CHLORIDE SERPL-SCNC: 103 MMOL/L (ref 98–107)
CO2 SERPL-SCNC: 29 MMOL/L (ref 21–32)
CREAT SERPL-MCNC: 1.16 MG/DL (ref 0.5–1.05)
EGFRCR SERPLBLD CKD-EPI 2021: 51 ML/MIN/1.73M*2
EOSINOPHIL # BLD AUTO: 0.18 X10*3/UL (ref 0–0.7)
EOSINOPHIL NFR BLD AUTO: 4 %
ERYTHROCYTE [DISTWIDTH] IN BLOOD BY AUTOMATED COUNT: 13.1 % (ref 11.5–14.5)
GLUCOSE SERPL-MCNC: 97 MG/DL (ref 74–99)
HCT VFR BLD AUTO: 38.1 % (ref 36–46)
HGB BLD-MCNC: 12.4 G/DL (ref 12–16)
IMM GRANULOCYTES # BLD AUTO: 0 X10*3/UL (ref 0–0.7)
IMM GRANULOCYTES NFR BLD AUTO: 0 % (ref 0–0.9)
LYMPHOCYTES # BLD AUTO: 1.83 X10*3/UL (ref 1.2–4.8)
LYMPHOCYTES NFR BLD AUTO: 40.3 %
MCH RBC QN AUTO: 28.4 PG (ref 26–34)
MCHC RBC AUTO-ENTMCNC: 32.5 G/DL (ref 32–36)
MCV RBC AUTO: 87 FL (ref 80–100)
MONOCYTES # BLD AUTO: 0.5 X10*3/UL (ref 0.1–1)
MONOCYTES NFR BLD AUTO: 11 %
NEUTROPHILS # BLD AUTO: 2.01 X10*3/UL (ref 1.2–7.7)
NEUTROPHILS NFR BLD AUTO: 44.3 %
NRBC BLD-RTO: NORMAL /100{WBCS}
PLATELET # BLD AUTO: 187 X10*3/UL (ref 150–450)
POTASSIUM SERPL-SCNC: 3.5 MMOL/L (ref 3.5–5.3)
PROT SERPL-MCNC: 7.2 G/DL (ref 6.4–8.2)
RBC # BLD AUTO: 4.36 X10*6/UL (ref 4–5.2)
SODIUM SERPL-SCNC: 140 MMOL/L (ref 136–145)
WBC # BLD AUTO: 4.5 X10*3/UL (ref 4.4–11.3)

## 2024-09-10 PROCEDURE — 84075 ASSAY ALKALINE PHOSPHATASE: CPT

## 2024-09-10 PROCEDURE — 96413 CHEMO IV INFUSION 1 HR: CPT

## 2024-09-10 PROCEDURE — 2500000004 HC RX 250 GENERAL PHARMACY W/ HCPCS (ALT 636 FOR OP/ED): Performed by: STUDENT IN AN ORGANIZED HEALTH CARE EDUCATION/TRAINING PROGRAM

## 2024-09-10 PROCEDURE — 1159F MED LIST DOCD IN RCRD: CPT | Performed by: STUDENT IN AN ORGANIZED HEALTH CARE EDUCATION/TRAINING PROGRAM

## 2024-09-10 PROCEDURE — 1126F AMNT PAIN NOTED NONE PRSNT: CPT | Performed by: STUDENT IN AN ORGANIZED HEALTH CARE EDUCATION/TRAINING PROGRAM

## 2024-09-10 PROCEDURE — 99214 OFFICE O/P EST MOD 30 MIN: CPT | Mod: 25 | Performed by: STUDENT IN AN ORGANIZED HEALTH CARE EDUCATION/TRAINING PROGRAM

## 2024-09-10 PROCEDURE — 2500000004 HC RX 250 GENERAL PHARMACY W/ HCPCS (ALT 636 FOR OP/ED): Mod: JG | Performed by: STUDENT IN AN ORGANIZED HEALTH CARE EDUCATION/TRAINING PROGRAM

## 2024-09-10 PROCEDURE — 1036F TOBACCO NON-USER: CPT | Performed by: STUDENT IN AN ORGANIZED HEALTH CARE EDUCATION/TRAINING PROGRAM

## 2024-09-10 PROCEDURE — 2500000005 HC RX 250 GENERAL PHARMACY W/O HCPCS: Performed by: STUDENT IN AN ORGANIZED HEALTH CARE EDUCATION/TRAINING PROGRAM

## 2024-09-10 PROCEDURE — 99214 OFFICE O/P EST MOD 30 MIN: CPT | Performed by: STUDENT IN AN ORGANIZED HEALTH CARE EDUCATION/TRAINING PROGRAM

## 2024-09-10 PROCEDURE — 85025 COMPLETE CBC W/AUTO DIFF WBC: CPT

## 2024-09-10 RX ORDER — ALBUTEROL SULFATE 0.83 MG/ML
3 SOLUTION RESPIRATORY (INHALATION) AS NEEDED
Status: DISCONTINUED | OUTPATIENT
Start: 2024-09-10 | End: 2024-09-10 | Stop reason: HOSPADM

## 2024-09-10 RX ORDER — DIPHENHYDRAMINE HYDROCHLORIDE 50 MG/ML
50 INJECTION INTRAMUSCULAR; INTRAVENOUS AS NEEDED
Status: DISCONTINUED | OUTPATIENT
Start: 2024-09-10 | End: 2024-09-10 | Stop reason: HOSPADM

## 2024-09-10 RX ORDER — HEPARIN 100 UNIT/ML
500 SYRINGE INTRAVENOUS AS NEEDED
Status: DISCONTINUED | OUTPATIENT
Start: 2024-09-10 | End: 2024-09-10 | Stop reason: HOSPADM

## 2024-09-10 RX ORDER — EPINEPHRINE 0.3 MG/.3ML
0.3 INJECTION SUBCUTANEOUS EVERY 5 MIN PRN
Status: DISCONTINUED | OUTPATIENT
Start: 2024-09-10 | End: 2024-09-10 | Stop reason: HOSPADM

## 2024-09-10 RX ORDER — PROCHLORPERAZINE EDISYLATE 5 MG/ML
10 INJECTION INTRAMUSCULAR; INTRAVENOUS EVERY 6 HOURS PRN
Status: DISCONTINUED | OUTPATIENT
Start: 2024-09-10 | End: 2024-09-10 | Stop reason: HOSPADM

## 2024-09-10 RX ORDER — FAMOTIDINE 10 MG/ML
20 INJECTION INTRAVENOUS ONCE AS NEEDED
Status: DISCONTINUED | OUTPATIENT
Start: 2024-09-10 | End: 2024-09-10 | Stop reason: HOSPADM

## 2024-09-10 RX ORDER — PROCHLORPERAZINE MALEATE 10 MG
10 TABLET ORAL EVERY 6 HOURS PRN
Status: DISCONTINUED | OUTPATIENT
Start: 2024-09-10 | End: 2024-09-10 | Stop reason: HOSPADM

## 2024-09-10 RX ORDER — HEPARIN 100 UNIT/ML
500 SYRINGE INTRAVENOUS AS NEEDED
OUTPATIENT
Start: 2024-09-10

## 2024-09-10 RX ORDER — HEPARIN SODIUM,PORCINE/PF 10 UNIT/ML
50 SYRINGE (ML) INTRAVENOUS AS NEEDED
OUTPATIENT
Start: 2024-09-10

## 2024-09-10 ASSESSMENT — PAIN SCALES - GENERAL: PAINLEVEL: 0-NO PAIN

## 2024-09-10 NOTE — PROGRESS NOTES
Patient ID: Zina Hernandez is a 68 y.o. female.    The patient presents to clinic today for her history of breast cancer.     Cancer Staging   Malignant neoplasm of upper-outer quadrant of left breast in female, estrogen receptor positive (Multi)  Staging form: Breast, AJCC 8th Edition  - Pathologic: Stage IA (pT1c, pN0, cM0, G2, ER+, VT+, HER2+) - Signed by Mirian Blanchard DO on 6/24/2024        Diagnostic/Therapeutic History:    The patient presents to clinic today for her history of breast cancer.     Diagnostic/Therapeutic History:  Cancer History:          Breast         AJCC Edition: 8th (AJCC), Diagnosis Date: 30-Aug-2023, IA, pT1c pN0 cM0 G2     Treatment Synopsis:    - On 6/14/2023 screening mammogram she had 2 adjacent irregular spiculated masses in the upper outer left breast at middle depth.  No suspicious masses or calcification  in the right breast.  BI-RADS Category 0     -On 6/28/2023 she had targeted ultrasound that showed at 1:00, 4 cm from the nipple an irregular hypoechoic mass measuring 3 x 5 x 4 mm this corresponds to the mammographic finding.  Left axilla showed 4 morphologically normal lymph nodes without lymphadenopathy      -On 7/14/2023 she had left breast mass ultrasound guided core needle  invasive lobular carcinoma, grade 2 with  ER 95%, VT 20%, HER2 positive 3+, also had lobular carcinoma in situ     -On 8/30/2023 Dr. Balnchard performed left partial mastectomy with sentinel lymph node biopsy (0/1) 2 foci of cancer largest 1 measuring 13 mm, second 1 measuring 7 mm, margins were negative final stage [id number]     -on 01/02/2024: completed TH    - On 02/20/2024: completed radiation therapy    - 06/24/2024: mammogram negative       History of Present Illness (HPI)/Interval History:    Does have left shoulder pain and left hip- No fever, no chills, no nausea, no vomiting, does have hot flushes. Does have mood swings.     14 point review of system otherwise unremarkable     PMH: as above      Meds: allergies reviewed      Reproductive history: Menarche at 13, AFLB: 27,  menopause at 50, no HRT     Family history maternal grandmother with uterine cancer in her 60s    She denies any fevers or chills.      Review of Systems:  14-point ROS otherwise negative, as per HPI.    Past Medical History:   Diagnosis Date    Breast cancer (Multi)     Cancer (Multi)     Hx antineoplastic chemo     Migraine     Other chest pain 01/15/2024    Personal history of irradiation     Urinary tract infection     Varicella        Past Surgical History:   Procedure Laterality Date    BREAST LUMPECTOMY         Social History     Socioeconomic History    Marital status:    Tobacco Use    Smoking status: Never    Smokeless tobacco: Never   Vaping Use    Vaping status: Never Used   Substance and Sexual Activity    Alcohol use: Never    Drug use: Never    Sexual activity: Yes     Partners: Male     Birth control/protection: None     Comment: took birth control pills for 15 years     Social Determinants of Health     Financial Resource Strain: Low Risk  (6/2/2024)    Received from [facility]    Overall Financial Resource Strain (CARDIA)     Difficulty of Paying Living Expenses: Not hard at all   Food Insecurity: No Food Insecurity (6/2/2024)    Received from [facility]    Hunger Vital Sign     Worried About Running Out of Food in the Last Year: Never true     Ran Out of Food in the Last Year: Never true   Transportation Needs: No Transportation Needs (6/2/2024)    Received from [facility]    PRAPARE - Transportation     Lack of Transportation (Medical): No     Lack of Transportation (Non-Medical): No   Physical Activity: Patient Declined (6/2/2024)    Received from [facility]    Exercise Vital Sign     Days of Exercise per Week: Patient declined     Minutes of Exercise per Session: Patient declined   Stress: Stress Concern Present  (6/2/2024)    Received from [facility], [facility] Occupational [facility] - Occupational Stress Questionnaire     Feeling of Stress : To some extent   Social Connections: Moderately Integrated (6/2/2024)    Received from [facility]    Social Connection and Isolation Panel [NHANES]     Frequency of Communication with Friends and Family: More than three times a week     Frequency of Social Gatherings with Friends and Family: More than three times a week     Attends Quaker Services: 1 to 4 times per year     Active Member of Clubs or Organizations: No     Attends Club or Organization Meetings: Patient declined     Marital Status: Living with partner   Housing Stability: Low Risk  (6/2/2024)    Received from [facility]    Housing Stability Vital Sign     Unable to Pay for Housing in the Last Year: No     Number of Places Lived in the Last Year: 1     Unstable Housing in the Last Year: No       Allergies   Allergen Reactions    Paclitaxel Other     Back pain and chest pain.     Tamiflu [Oseltamivir] Unknown         Current Outpatient Medications:     exemestane (Aromasin) 25 mg tablet, Take 1 tablet (25 mg total) by mouth once daily.  Take after a meal.  Try to take at the same time each day., Disp: 30 tablet, Rfl: 3    losartan (Cozaar) 25 mg tablet, Take 1 tablet (25 mg) by mouth once daily., Disp: 90 tablet, Rfl: 3     Objective    BSA: 1.56 meters squared  /75 (BP Location: Left arm, Patient Position: Sitting)   Pulse 85   Temp 37 °C (98.6 °F) (Temporal)   Resp 16   Wt 58.8 kg (129 lb 11.2 oz)   SpO2 97%   BMI 26.20 kg/m²     ECOG Performance Status: 0    Physical Exam    Constitutional: Well developed, awake/alert/oriented  x3, no distress, alert and cooperative   Eyes: PERRL, EOMI, clear sclera   ENMT: mucous membranes moist, no apparent injury,  no lesions seen   Head/Neck: Neck supple, no apparent injury, thyroid  without  mass or tenderness, No JVD, trachea midline, no bruits   Respiratory/Thorax: Patent airways, CTAB, normal  breath sounds with good chest expansion, thorax symmetric   Cardiovascular: Regular, rate and rhythm, no murmurs,  2+ equal pulses of the extremities, normal S 1and S 2   Gastrointestinal: Nondistended, soft, non-tender,  no rebound tenderness or guarding, no masses palpable, no organomegaly, +BS, no bruits   Extremities: normal extremities, no cyanosis edema,  contusions or wounds, no clubbing   Breast: Left surgical incision healed well,  no new nodules or lymphadenopathy.  No right breast nodules or lymphadenopathy        Laboratory Data:  Lab Results   Component Value Date    WBC 4.5 09/10/2024    HGB 12.4 09/10/2024    HCT 38.1 09/10/2024    MCV 87 09/10/2024     09/10/2024    ANC 2.31 11/21/2023       Chemistry    Lab Results   Component Value Date/Time     05/29/2024 0638    K 4.2 05/29/2024 0638     05/29/2024 0638    CO2 31 05/29/2024 0638    BUN 16 05/29/2024 0638    CREATININE 1.12 (H) 05/29/2024 0638    Lab Results   Component Value Date/Time    CALCIUM 9.6 05/29/2024 0638    ALKPHOS 67 05/29/2024 0638    AST 23 05/29/2024 0638    ALT 18 05/29/2024 0638    BILITOT 0.7 05/29/2024 0638             Radiology:  Onco-Echo Complete (Strain & 3D)     [facility], [address] [place] [address]              Tel [phone] and Fax [phone]    TRANSTHORACIC ECHOCARDIOGRAM REPORT       Patient Name:      HUNTER CALDERON DANE Neri Physician:    [id number] Alex Brewer MD  Study Date:        8/21/2024            Ordering Provider:    [id number] GÉNESIS WOODS  MRN/PID:           [id number]             Fellow:  Accession#:        [id number]         Nurse:  Date of Birth/Age: 1955 / 68 years Sonographer:          Milo Shannon                                                                 YAHIR, FAY  Gender:            F                    Additional Staff:  Height:            149.86 cm            Admit Date:  Weight:            58.97 kg             Admission Status:     Outpatient  BSA / BMI:         1.54 m2 / 26.26      Encounter#:           [id number]                     kg/m2  Blood Pressure:    161/84 mmHg          Department Location:  [facility]    Study Type:    ONCO-ECHO COMPLETE (STRAIN AND 3D)  Diagnosis/ICD: Encounter for monitoring cardiotoxic drug therapy-Z51.81  Indication:    Encounter for monitoring cardiotoxic chemotherapy  CPT Code:      Echo Complete w Full Doppler-04425; 3D Rendering w/o independent                 workstation-[id number]; Myocardial Strain Imaging-32490    Patient History:  Pertinent History: Encounter for monitoring cardiotoxic chemotherapy, L-breast                     CA.    Study Detail: The following Echo studies were performed: 2D, M-Mode, Doppler,                color flow, Strain and 3D.       PHYSICIAN INTERPRETATION:  Left Ventricle: The left ventricular systolic function is normal, with a visually estimated ejection fraction of 60-65%. There are no regional left ventricular wall motion abnormalities. The left ventricular cavity size is normal. The left ventricular septal wall thickness is mildly increased. There is normal left ventricular posterior wall thickness. Spectral Doppler shows a normal pattern of left ventricular diastolic filling. Strain values are normal, which imply normal myocardial function.  Left Atrium: The left atrium is normal in size.  Right Ventricle: The right ventricle is normal in size. There is normal right ventricular global systolic function.  Right Atrium: The right atrium is normal in size.  Aortic Valve: The aortic valve is trileaflet. There is minimal aortic valve cusp calcification. There is no evidence of aortic valve stenosis. There is no evidence of aortic valve regurgitation.  The peak instantaneous gradient of the aortic valve is 3.6 mmHg.  Mitral Valve: The mitral valve is mildly thickened. There is mitral valve prolapse. There is mild mitral valve prolapse. There is mild mitral valve regurgitation. MR is mid-late systolic.  Tricuspid Valve: The tricuspid valve is structurally normal. There is trace to mild tricuspid regurgitation. The Doppler estimated RVSP is within normal limits at 21.5 mmHg.  Pulmonic Valve: The pulmonic valve is not well visualized. There is trace to mild pulmonic valve regurgitation.  Pericardium: There is no pericardial effusion noted.  Aorta: The aortic root is normal.  Systemic Veins: The inferior vena cava size appears small. There is IVC inspiratory collapse greater than 50%.  In comparison to the previous echocardiogram(s): Compared with study dated 5/20/2024, LV GLS and LVEF have improved.     ONCO-CARDIOLOGY:  Vital Signs: The patients heart rate during the study was 80 beats per minute.  The patients blood pressure was 161/84 mmHg during the study.  Machine: This study was performed on the transOMIC E-9.  Previous Study: The patient had a previous Onco-Cardiology echocardiogram dated  5/20/2024. The patient's previous study was performed on the transOMIC E-95.       Onco-Cardiology Measurements:  Historical Measurements from Previous Study  2D EF (Biplane)                     55%  3D EF                               55%  Global Longitudinal Strain (GLS) -17.8%    Current Measurements  2D EF (Biplane)                     63%  3D EF                               64%  Global Longitudinal Strain (GLS) -19.3%    GLS Tracking Quality: Good       CONCLUSIONS:   1. The left ventricular systolic function is normal, with a visually estimated ejection fraction of 60-65%.   2. There is normal right ventricular global systolic function.   3. RVSP within normal limits.   4. Aortic valve stenosis is not present.   5. Strain values are normal, which imply normal myocardial  function.    QUANTITATIVE DATA SUMMARY:  2D MEASUREMENTS:                          Normal Ranges:  Ao Root d:     3.10 cm  (2.0-3.7cm)  LAs:           2.30 cm  (2.7-4.0cm)  RVIDd:         2.58 cm  (0.9-3.6cm)  IVSd:          0.92 cm  (0.6-1.1cm)  LVPWd:         0.74 cm  (0.6-1.1cm)  LVIDd:         3.69 cm  (3.9-5.9cm)  LVIDs:         2.35 cm  LV Mass Index: 56 g/m2  LVEDV Index:   48 ml/m2  LV % FS        36.2 %    LA VOLUME:                                Normal Ranges:  LA Vol A4C:        35.0 ml    (22+/-6mL/m2)  LA Vol A2C:        43.3 ml  LA Vol BP:         42.8 ml  LA Vol Index A4C:  22.8ml/m2  LA Vol Index A2C:  28.2 ml/m2  LA Vol Index BP:   27.9 ml/m2  LA Area A4C:       12.5 cm2  LA Area A2C:       15.3 cm2  LA Major Axis A4C: 3.8 cm  LA Major Axis A2C: 4.6 cm  LA Volume Index:   28.0 ml/m2  LA Vol A4C:        33.5 ml  LA Vol A2C:        41.5 ml  LA Vol Index BSA:  24.4 ml/m2    M-MODE MEASUREMENTS:                   Normal Ranges:  Ao Root: 2.70 cm (2.0-3.7cm)  LAs:     3.77 cm (2.7-4.0cm)    AORTA MEASUREMENTS:                     Normal Ranges:  Asc Ao, d: 3.00 cm (2.1-3.4cm)    LV SYSTOLIC FUNCTION BY 2D PLANIMETRY (MOD):                       Normal Ranges:  EF-A4C View:    64 % (>=55%)  EF-A2C View:    65 %  EF-Biplane:     63 %  EF-Visual:      63 %  EF-Auto         56 %  LV EF Reported: 63 %    LV DIASTOLIC FUNCTION:                               Normal Ranges:  MV Peak E:        0.87 m/s   (0.7-1.2 m/s)  MV Peak A:        0.73 m/s   (0.42-0.7 m/s)  E/A Ratio:        1.20       (1.0-2.2)  MV e'             0.100 m/s  (>8.0)  MV lateral e'     0.12 m/s  MV medial e'      0.08 m/s  MV A Dur:         92.27 msec  E/e' Ratio:       8.71       (<8.0)  a'                0.10 m/s  PulmV Sys Steve:    53.12 cm/s  PulmV Edge Steve:   58.62 cm/s  PulmV S/D Steve:    0.91  PulmV A Revs Steve: 33.59 cm/s  PulmV A Revs Dur: 59.98 msec    MITRAL VALVE:                  Normal Ranges:  MV DT: 166 msec  (150-240msec)    AORTIC VALVE:                          Normal Ranges:  AoV Vmax:      0.95 m/s (<=1.7m/s)  AoV Peak PG:   3.6 mmHg (<20mmHg)  LVOT Max Steve:  0.91 m/s (<=1.1m/s)  LVOT VTI:      18.73 cm  LVOT Diameter: 1.94 cm  (1.8-2.4cm)  AoV Area,Vmax: 2.82 cm2 (2.5-4.5cm2)       RIGHT VENTRICLE:  RV Basal 2.80 cm  RV Mid   2.60 cm  RV Major 4.8 cm  TAPSE:   23.0 mm  RV s'    0.12 m/s    TRICUSPID VALVE/RVSP:                              Normal Ranges:  Peak TR Velocity: 2.15 m/s  RV Syst Pressure: 21.5 mmHg (< 30mmHg)  IVC Diam:         1.00 cm    PULMONIC VALVE:                          Normal Ranges:  PV Accel Time: 107 msec (>120ms)  PV Max Steve:    0.9 m/s  (0.6-0.9m/s)  PV Max PG:     3.6 mmHg    Pulmonary Veins:  PulmV A Revs Dur: 59.98 msec  PulmV A Revs Steve: 33.59 cm/s  PulmV Edge Steve:   58.62 cm/s  PulmV S/D Steve:    0.91  PulmV Sys Steve:    53.12 cm/s    AORTA:  Asc Ao Diam 3.02 cm       [id number] Alex Brewer MD  Electronically signed on 8/23/2024 at 11:20:14 AM       ** Final **       BI mammo bilateral screening tomosynthesis 06/14/2023    Narrative  Interpreted By:  ENZO MANUEL MD  MRN: [id number]  Patient Name: HUNTER VELA    STUDY:  DIGITAL MAMM SCREENING W/ RUBI;  6/14/2023 8:00 am    ACCESSION NUMBER(S):  [id number]    ORDERING CLINICIAN:  KATIE HINOJOSA    INDICATION:  Screening.    COMPARISON:  09/08/2021, 07/17/2020, 01/17/2019    FINDINGS:  2D and tomosynthesis images were reviewed at 1 mm slice thickness.    There are areas of scattered fibroglandular tissue.  There are 2  adjacent irregular spiculated equal density masses in the upper outer  left breast at middle depth. No suspicious masses or calcifications  are identified in the right breast.    Impression  No mammographic evidence of malignancy in the right breast. Left  breast mass x2.    BI-RADS CATEGORY:    Category: 0 - Incomplete; Need Additional Imaging Evaluation and/or  Prior Mammograms for Comparison.  Recommendation:  Ultrasound Recommended.    For any future breast imaging appointments, please call [phone]).    Patient letter sent SADEVAL          Assessment/Plan:    68-year-old female previously healthy found to have on screening mammogram 2 adjacent irregular spiculated masses in the upper outer left breast at middle depth with  US showing a 5mm mass with left axilla showing 4 negative LN s/p  guided core needle  invasive lobular carcinoma, grade 2 with  ER 95%, WA 20%, HER2 positive 3+, also had lobular carcinoma in situ. Dr. Blanchard performed left partial mastectomy with sentinel  lymph node biopsy (0/1) 2 foci of cancer largest 1 measuring 13 mm, second 1 measuring 7 mm, margins were negative final stage PT1CN0. She is presenting to discuss adjuvant treatment options.      I reviewed with her the events that led to her diagnosis of breast cancer. We reviewed all the procedures and diagnostic imaging she underwent thus far. I discussed the features of her breast cancer that include the size, grade, lymph node status and  hormone receptor/ her2-iyng status.     Based on APT trial recommended to proceed with THx12 weeks followed by radiation followed by hormonal therapy and herceptin to complete for a total of 1 year     Based on the updated 10 year results of the APT trial published in the [employer]ancet in March of this year:      410 patients were enrolled and 406 were given adjuvant paclitaxel and trastuzumab and included in the analysis. Mean age at enrolment was 55 years (SD 10·5), 405 (99·8%) of 406 patients were female and one (0·2%) was male, 350 (86·2%) were  White, 28 (6·9%) were Black or , and 272 (67·0%) had hormone receptor-positive disease. After a median follow-up of 10·8 years (IQR 7·1-11·4), among 406 patients included in the analysis population, we observed 31  invasive disease-free survival events, of which six (19·4%) were locoregional ipsilateral recurrences, nine (29·0%) were new  contralateral breast cancers, six (19·4%) were distant recurrences, and ten (32·3%) were all-cause deaths.  10-year invasive disease-free survival was 91·3% (95% CI 88·3-94·4), 10-year recurrence-free interval was 96·3% (95% CI 94·3-98·3), 10-year overall survival was 94·3% (95% CI 91·8-96·8), and 10-year breast  cancer-specific survival was 98·8% (95% CI 97·6-100)    on 01/02/2024: completed TH-     On 02/20/2024: completed radiation therapy    Echo showed 12% Decrease In EF- New echo: 08/21/2024: EF: 60-65%- continue herceptin: last one 10/01/2024  On losartan 25mg because of drop in EF; now EF is good  on exemestane, Ca/vit D- hold now because of joint, muscle pain- she will update us in 1 week- if pain is better off exemestane, will switch to another AI    RTC in       At least 35 minutes of direct consultation was spent reviewing the patient's chart as well as discussing and  reviewing the  cancer care plan including educating and answering  questions and concerns, greater than 50 percent spent in counseling and coordination  of care.            Cony Amin MD  Hematology and Medical Oncology  [facility]

## 2024-09-10 NOTE — SIGNIFICANT EVENT

## 2024-09-24 DIAGNOSIS — C50.412 MALIGNANT NEOPLASM OF UPPER-OUTER QUADRANT OF LEFT BREAST IN FEMALE, ESTROGEN RECEPTOR POSITIVE: Primary | ICD-10-CM

## 2024-09-24 DIAGNOSIS — Z17.0 MALIGNANT NEOPLASM OF UPPER-OUTER QUADRANT OF LEFT BREAST IN FEMALE, ESTROGEN RECEPTOR POSITIVE: Primary | ICD-10-CM

## 2024-10-01 ENCOUNTER — INFUSION (OUTPATIENT)
Dept: HEMATOLOGY/ONCOLOGY | Facility: CLINIC | Age: 69
End: 2024-10-01
Payer: MEDICARE

## 2024-10-01 ENCOUNTER — APPOINTMENT (OUTPATIENT)
Dept: HEMATOLOGY/ONCOLOGY | Facility: CLINIC | Age: 69
End: 2024-10-01
Payer: MEDICARE

## 2024-10-01 VITALS
HEART RATE: 81 BPM | BODY MASS INDEX: 26.34 KG/M2 | OXYGEN SATURATION: 98 % | RESPIRATION RATE: 18 BRPM | DIASTOLIC BLOOD PRESSURE: 80 MMHG | WEIGHT: 130.4 LBS | SYSTOLIC BLOOD PRESSURE: 148 MMHG | TEMPERATURE: 98.2 F

## 2024-10-01 DIAGNOSIS — C50.912 INVASIVE LOBULAR CARCINOMA OF LEFT BREAST IN FEMALE: ICD-10-CM

## 2024-10-01 PROCEDURE — 2500000004 HC RX 250 GENERAL PHARMACY W/ HCPCS (ALT 636 FOR OP/ED): Mod: JG | Performed by: STUDENT IN AN ORGANIZED HEALTH CARE EDUCATION/TRAINING PROGRAM

## 2024-10-01 PROCEDURE — 2500000004 HC RX 250 GENERAL PHARMACY W/ HCPCS (ALT 636 FOR OP/ED): Performed by: STUDENT IN AN ORGANIZED HEALTH CARE EDUCATION/TRAINING PROGRAM

## 2024-10-01 PROCEDURE — 96413 CHEMO IV INFUSION 1 HR: CPT

## 2024-10-01 PROCEDURE — 2500000005 HC RX 250 GENERAL PHARMACY W/O HCPCS: Performed by: STUDENT IN AN ORGANIZED HEALTH CARE EDUCATION/TRAINING PROGRAM

## 2024-10-01 RX ORDER — HEPARIN SODIUM,PORCINE/PF 10 UNIT/ML
50 SYRINGE (ML) INTRAVENOUS AS NEEDED
OUTPATIENT
Start: 2024-10-01

## 2024-10-01 RX ORDER — HEPARIN 100 UNIT/ML
500 SYRINGE INTRAVENOUS AS NEEDED
OUTPATIENT
Start: 2024-10-01

## 2024-10-01 RX ORDER — FAMOTIDINE 10 MG/ML
20 INJECTION INTRAVENOUS ONCE AS NEEDED
Status: DISCONTINUED | OUTPATIENT
Start: 2024-10-01 | End: 2024-10-01 | Stop reason: HOSPADM

## 2024-10-01 RX ORDER — EPINEPHRINE 0.3 MG/.3ML
0.3 INJECTION SUBCUTANEOUS EVERY 5 MIN PRN
Status: DISCONTINUED | OUTPATIENT
Start: 2024-10-01 | End: 2024-10-01 | Stop reason: HOSPADM

## 2024-10-01 RX ORDER — PROCHLORPERAZINE MALEATE 10 MG
10 TABLET ORAL EVERY 6 HOURS PRN
Status: DISCONTINUED | OUTPATIENT
Start: 2024-10-01 | End: 2024-10-01 | Stop reason: HOSPADM

## 2024-10-01 RX ORDER — PROCHLORPERAZINE EDISYLATE 5 MG/ML
10 INJECTION INTRAMUSCULAR; INTRAVENOUS EVERY 6 HOURS PRN
Status: DISCONTINUED | OUTPATIENT
Start: 2024-10-01 | End: 2024-10-01 | Stop reason: HOSPADM

## 2024-10-01 RX ORDER — HEPARIN 100 UNIT/ML
500 SYRINGE INTRAVENOUS AS NEEDED
Status: DISCONTINUED | OUTPATIENT
Start: 2024-10-01 | End: 2024-10-01 | Stop reason: HOSPADM

## 2024-10-01 RX ORDER — DIPHENHYDRAMINE HYDROCHLORIDE 50 MG/ML
50 INJECTION INTRAMUSCULAR; INTRAVENOUS AS NEEDED
Status: DISCONTINUED | OUTPATIENT
Start: 2024-10-01 | End: 2024-10-01 | Stop reason: HOSPADM

## 2024-10-01 RX ORDER — ALBUTEROL SULFATE 0.83 MG/ML
3 SOLUTION RESPIRATORY (INHALATION) AS NEEDED
Status: DISCONTINUED | OUTPATIENT
Start: 2024-10-01 | End: 2024-10-01 | Stop reason: HOSPADM

## 2024-10-01 ASSESSMENT — PAIN SCALES - GENERAL: PAINLEVEL: 0-NO PAIN

## 2024-10-07 ENCOUNTER — TELEPHONE (OUTPATIENT)
Dept: ADMISSION | Facility: HOSPITAL | Age: 69
End: 2024-10-07
Payer: MEDICARE

## 2024-10-07 DIAGNOSIS — C50.912 INVASIVE LOBULAR CARCINOMA OF LEFT BREAST IN FEMALE: Primary | ICD-10-CM

## 2024-10-07 NOTE — TELEPHONE ENCOUNTER
Zina is finished with chemo. Asking if we can place an order for a port removal.   Patient would like this done at [place].   No further questions or concerns at this time.

## 2024-10-15 ENCOUNTER — HOSPITAL ENCOUNTER (OUTPATIENT)
Dept: RADIATION ONCOLOGY | Facility: CLINIC | Age: 69
Setting detail: RADIATION/ONCOLOGY SERIES
Discharge: HOME | End: 2024-10-15
Payer: MEDICARE

## 2024-10-15 VITALS
HEART RATE: 78 BPM | SYSTOLIC BLOOD PRESSURE: 149 MMHG | OXYGEN SATURATION: 96 % | DIASTOLIC BLOOD PRESSURE: 81 MMHG | RESPIRATION RATE: 18 BRPM | WEIGHT: 131.61 LBS | BODY MASS INDEX: 26.58 KG/M2 | TEMPERATURE: 98.2 F

## 2024-10-15 DIAGNOSIS — C50.412 MALIGNANT NEOPLASM OF UPPER-OUTER QUADRANT OF LEFT BREAST IN FEMALE, ESTROGEN RECEPTOR POSITIVE: ICD-10-CM

## 2024-10-15 DIAGNOSIS — Z17.0 MALIGNANT NEOPLASM OF UPPER-OUTER QUADRANT OF LEFT BREAST IN FEMALE, ESTROGEN RECEPTOR POSITIVE: ICD-10-CM

## 2024-10-15 PROCEDURE — 99213 OFFICE O/P EST LOW 20 MIN: CPT | Performed by: NURSE PRACTITIONER

## 2024-10-15 ASSESSMENT — ENCOUNTER SYMPTOMS
DEPRESSION: 0
OCCASIONAL FEELINGS OF UNSTEADINESS: 0
LOSS OF SENSATION IN FEET: 0

## 2024-10-15 ASSESSMENT — PAIN SCALES - GENERAL: PAINLEVEL: 4

## 2024-10-15 NOTE — PROGRESS NOTES
Radiation Oncology Follow-Up    Patient Name:  Zina Hernandez  MRN:  [id number]  :  1955    Referring Provider: No ref. provider found  Primary Care Provider: Baljinder Avendaño MD  Care Team: Patient Care Team:  Baljinder Avendaño MD as PCP - General (Family Medicine)  Cony Amin MD as Consulting Physician (Hematology and Oncology)  Ailyn Mason MD as Cardiologist (Cardiology)    Date of Service: 10/15/2024     Diagnostic/Therapeutic History:  Cancer History:          Breast         AJCC Edition: 8th (AJCC), Diagnosis Date: 30-Aug-2023, IA, pT1c pN0 cM0 G2     Treatment Synopsis:    - On 2023 screening mammogram she had 2 adjacent irregular spiculated masses in the upper outer left breast at middle depth.  No suspicious masses or calcification  in the right breast.  BI-RADS Category 0     -On 2023 she had targeted ultrasound that showed at 1:00, 4 cm from the nipple an irregular hypoechoic mass measuring 3 x 5 x 4 mm this corresponds to the mammographic finding.  Left axilla showed 4 morphologically normal lymph nodes without lymphadenopathy      -On 2023 she had left breast mass ultrasound guided core needle  invasive lobular carcinoma, grade 2 with  ER 95%, RI 20%, HER2 positive 3+, also had lobular carcinoma in situ     -On 2023 Dr. Blanchard performed left partial mastectomy with sentinel lymph node biopsy () 2 foci of cancer largest 1 measuring 13 mm, second 1 measuring 7 mm, margins were negative final stage [id number]      -2024: completed      - 24 - 24: radiation therapy to left breast consisting of a dose of 42.56 Gy.     - Adjuvant Herceptin x 1 yr completed 10/1/24    SUBJECTIVE  History of Present Illness:   Zina Hernandez is here today for routine radiation follow up/surveillance visit.  She is doing well and reports left well healed and skin intact. She continues exemestane and no adverse effects.  Denies any swelling of left arm or difficulty with ROM.   No headaches, fever, chills, cough, SOB, chest pain, N/V or bony pain. She completed adjuvant Herceptin 10/1/24. Mammogram in June without evidence of malignancy.    Review of Systems:    Review of Systems   All other systems reviewed and are negative.    Performance Status:   The Karnofsky performance scale today is 90, Able to carry on normal activity; minor signs or symptoms of disease (ECOG equivalent 0).      OBJECTIVE    Current Outpatient Medications:     exemestane (Aromasin) 25 mg tablet, Take 1 tablet (25 mg total) by mouth once daily.  Take after a meal.  Try to take at the same time each day., Disp: 30 tablet, Rfl: 3    losartan (Cozaar) 25 mg tablet, Take 1 tablet (25 mg) by mouth once daily., Disp: 90 tablet, Rfl: 3     Physical Exam  Constitutional:       Appearance: Normal appearance.   HENT:      Nose: Nose normal.      Mouth/Throat:      Mouth: Mucous membranes are moist.      Pharynx: Oropharynx is clear.   Eyes:      Conjunctiva/sclera: Conjunctivae normal.      Pupils: Pupils are equal, round, and reactive to light.   Cardiovascular:      Heart sounds: Normal heart sounds.   Pulmonary:      Breath sounds: Normal breath sounds.   Chest:   Breasts:     Right: No swelling, inverted nipple, mass or nipple discharge.      Left: No swelling, inverted nipple, mass or nipple discharge.      Comments: Left breast with well healed surgical incision. Mild tanning in radiation field.  Skin intact.   Abdominal:      Palpations: Abdomen is soft.   Musculoskeletal:         General: No swelling. Normal range of motion.      Cervical back: Normal range of motion and neck supple.   Lymphadenopathy:      Cervical: No cervical adenopathy.      Upper Body:      Right upper body: No supraclavicular or axillary adenopathy.      Left upper body: No supraclavicular or axillary adenopathy.   Neurological:      General: No focal deficit present.      Mental Status: She is alert and oriented to person, place, and time.    Psychiatric:         Mood and Affect: Mood normal.         Behavior: Behavior normal.      Narrative    Rad Selected: Y    Interpreted By:  Duyen Toure,  STUDY:  BI MAMMO BILATERAL DIAGNOSTIC TOMOSYNTHESIS;  6/24/2024 9:58 am      ACCESSION NUMBER(S):  [id number]      ORDERING CLINICIAN:  DAVID JAIN      INDICATION:  Left lumpectomy with chemotherapy and radiation.      COMPARISON:  06/14/2023, 09/08/2021      FINDINGS:  MAMMOGRAPHY: 2D and tomosynthesis images were reviewed at 1 mm slice  thickness.      Density:  There are areas of scattered fibroglandular tissue.      There is postsurgical scarring and surgical clips in the upper outer  left breast from previous lumpectomy. There is left axillary  postoperative scarring. Left skin and trabecular thickening is  consistent with radiation therapy changes. No suspicious masses or  calcifications are identified.      IMPRESSION:  Left post treatment changes. No mammographic evidence of malignancy.      BI-RADS CATEGORY:  BI-RADS Category:  2 Benign.  Recommendation:  Annual Screening.  Recommended Date:  1 Year.  Laterality:  Bilateral.       ASSESSMENT/PLAN:  69 y.o. female with early stage triple positive left breast cancer s/p breast conserving surgery followed by adjuvant chemotherapy/Herceptin followed by radiation. Doing well.  She will continue exemestane and follow up with Dr. Amin.  Radiation follow up in 6 mo.  Mammogram and FUV with Dr. Jain. Radiation follow up in 6 mo.  Call with any concerns.     Layne Locke CNP  [phone]

## 2024-10-28 ENCOUNTER — HOSPITAL ENCOUNTER (OUTPATIENT)
Dept: RADIOLOGY | Facility: HOSPITAL | Age: 69
Discharge: HOME | End: 2024-10-28
Payer: MEDICARE

## 2024-10-28 VITALS
OXYGEN SATURATION: 96 % | RESPIRATION RATE: 14 BRPM | DIASTOLIC BLOOD PRESSURE: 66 MMHG | TEMPERATURE: 36.8 F | HEIGHT: 59 IN | BODY MASS INDEX: 26.21 KG/M2 | WEIGHT: 130 LBS | HEART RATE: 81 BPM | SYSTOLIC BLOOD PRESSURE: 139 MMHG

## 2024-10-28 DIAGNOSIS — C50.912 INVASIVE LOBULAR CARCINOMA OF LEFT BREAST IN FEMALE: ICD-10-CM

## 2024-10-28 LAB
ERYTHROCYTE [DISTWIDTH] IN BLOOD BY AUTOMATED COUNT: 13.3 % (ref 11.5–14.5)
HCT VFR BLD AUTO: 39.7 % (ref 36–46)
HGB BLD-MCNC: 12.6 G/DL (ref 12–16)
MCH RBC QN AUTO: 28.1 PG (ref 26–34)
MCHC RBC AUTO-ENTMCNC: 31.7 G/DL (ref 32–36)
MCV RBC AUTO: 88 FL (ref 80–100)
NRBC BLD-RTO: 0 /100 WBCS (ref 0–0)
PLATELET # BLD AUTO: 236 X10*3/UL (ref 150–450)
RBC # BLD AUTO: 4.49 X10*6/UL (ref 4–5.2)
WBC # BLD AUTO: 4.4 X10*3/UL (ref 4.4–11.3)

## 2024-10-28 PROCEDURE — 77001 FLUOROGUIDE FOR VEIN DEVICE: CPT | Mod: RT | Performed by: RADIOLOGY

## 2024-10-28 PROCEDURE — 99152 MOD SED SAME PHYS/QHP 5/>YRS: CPT | Performed by: RADIOLOGY

## 2024-10-28 PROCEDURE — 85027 COMPLETE CBC AUTOMATED: CPT | Performed by: RADIOLOGY

## 2024-10-28 PROCEDURE — 7100000010 HC PHASE TWO TIME - EACH INCREMENTAL 1 MINUTE

## 2024-10-28 PROCEDURE — 36590 REMOVAL TUNNELED CV CATH: CPT | Mod: RT | Performed by: RADIOLOGY

## 2024-10-28 PROCEDURE — 99152 MOD SED SAME PHYS/QHP 5/>YRS: CPT

## 2024-10-28 PROCEDURE — 2500000004 HC RX 250 GENERAL PHARMACY W/ HCPCS (ALT 636 FOR OP/ED): Performed by: RADIOLOGY

## 2024-10-28 PROCEDURE — 2500000005 HC RX 250 GENERAL PHARMACY W/O HCPCS: Performed by: RADIOLOGY

## 2024-10-28 PROCEDURE — 2720000007 HC OR 272 NO HCPCS

## 2024-10-28 PROCEDURE — 7100000009 HC PHASE TWO TIME - INITIAL BASE CHARGE

## 2024-10-28 RX ORDER — MIDAZOLAM HYDROCHLORIDE 1 MG/ML
INJECTION, SOLUTION INTRAMUSCULAR; INTRAVENOUS
Status: COMPLETED | OUTPATIENT
Start: 2024-10-28 | End: 2024-10-28

## 2024-10-28 RX ORDER — LIDOCAINE HYDROCHLORIDE AND EPINEPHRINE 10; 10 MG/ML; UG/ML
INJECTION, SOLUTION INFILTRATION; PERINEURAL
Status: COMPLETED | OUTPATIENT
Start: 2024-10-28 | End: 2024-10-28

## 2024-10-28 RX ORDER — FENTANYL CITRATE 50 UG/ML
INJECTION, SOLUTION INTRAMUSCULAR; INTRAVENOUS
Status: COMPLETED | OUTPATIENT
Start: 2024-10-28 | End: 2024-10-28

## 2024-10-28 ASSESSMENT — PAIN SCALES - GENERAL
PAINLEVEL_OUTOF10: 0 - NO PAIN

## 2024-10-28 ASSESSMENT — PAIN - FUNCTIONAL ASSESSMENT: PAIN_FUNCTIONAL_ASSESSMENT: 0-10

## 2024-11-01 ENCOUNTER — EVALUATION (OUTPATIENT)
Dept: PHYSICAL THERAPY | Facility: CLINIC | Age: 69
End: 2024-11-01
Payer: MEDICARE

## 2024-11-01 DIAGNOSIS — Z17.0 MALIGNANT NEOPLASM OF UPPER-OUTER QUADRANT OF LEFT BREAST IN FEMALE, ESTROGEN RECEPTOR POSITIVE: ICD-10-CM

## 2024-11-01 DIAGNOSIS — M62.89 MUSCLE TIGHTNESS: ICD-10-CM

## 2024-11-01 DIAGNOSIS — C50.412 MALIGNANT NEOPLASM OF UPPER-OUTER QUADRANT OF LEFT BREAST IN FEMALE, ESTROGEN RECEPTOR POSITIVE: ICD-10-CM

## 2024-11-01 DIAGNOSIS — M25.512 LEFT SHOULDER PAIN: Primary | ICD-10-CM

## 2024-11-01 PROCEDURE — 97161 PT EVAL LOW COMPLEX 20 MIN: CPT | Mod: GP | Performed by: PHYSICAL THERAPIST

## 2024-11-01 PROCEDURE — 97110 THERAPEUTIC EXERCISES: CPT | Mod: GP | Performed by: PHYSICAL THERAPIST

## 2024-11-01 NOTE — PROGRESS NOTES
Physical Therapy    Physical Therapy Evaluation and Treatment      Patient Name: Zina Hernandez  MRN: [id number]  Today's Date: 11/13/2024  Insurance:  Med [place] Medicare  0% coins, no ded, $3000 oop ($1917.54 met), $30 copay, bmn tania yr, no PA   Visit 1 of 20 (re-assess at 10)  Time Entry:   Time Calculation  Start Time: 0830  Stop Time: 0920  Time Calculation (min): 50 min  PT Evaluation Time Entry  PT Evaluation (Low) Time Entry: 25  PT Therapeutic Procedures Time Entry  Therapeutic Exercise Time Entry: 15                   Assessment:    Pt is a 70 yo female c/o L shoulder pain and tightness after radiation therapy was completed. Unsure at this time what it was about the radiation that caused this tightness and pain, as she does not have signif palpable cording in the affected area. Presentation includes pain, decreased ROM, some weakness in lats and distally in the hand, resulting in mild to moderately affected ADLs and IADLs. Skilled PT is recommended to address these issues with manual tissue and joint mobilization, stretching, and progressive strengthening of the scap stabilizers as well as distal UE to best restore pre-treatment UE function and improve mechanics that are resulting in pain.    Plan:  OP PT Plan  Treatment/Interventions: Dry needling, Education/ Instruction, Manual therapy, Neuromuscular re-education, Self care/ home management, Therapeutic exercises, Therapeutic activities  PT Plan: Skilled PT  PT Frequency:  (1-2x/week)  Duration: 20  Certification Period Start Date: 11/01/24  Certification Period End Date: 01/30/25  Rehab Potential: Good  Plan of Care Agreement: Patient    Current Problem:   1. Left shoulder pain  Follow Up In Physical Therapy      2. Malignant neoplasm of upper-outer quadrant of left breast in female, estrogen receptor positive (Multi)  Referral to Physical Therapy      3. Muscle tightness            Subjective    General:  General  Referred By: Dr. Cony Arteaga reports  that she finished up treatment in Feb this year for L breast cancer that involved radiation. She notes that she had signif blackening from radiation burn in the lateral chest and axilla area. She states that after the treatments, she had shoulder pain that just wouldn't resolve.   Pain is in L shoulder and tricep area into the thumb. She c/o clicking in thumb MP with AROM. She had pain when playing volleyball with her granddaughter. She has pain when she sleeps on L side too long.     Precautions:     Vital Signs:     Pain:   Current: 1/10  Worst: 5/10  Best: 0/10  Location: L chest/axilla, going up into the neck. Clicking in the thumb    Home Living:    Current Level of Function:   reaching overhead: not limited, but pain  reaching behind back: not very limited, but painful.  dressing ADLs: some difficulty reaching into sleeves.  bathing ADLs: needs assist for horiz adduction  lifting floor to waist: about 10 lbs.  pushing/pulling: does not have issue with current needs.    She just got her R side port removed 10-.     Objective   Cognition:     Outcome Measures:  Other Measures  Disability of Arm Shoulder Hand (DASH): 32%     Treatments:  Ther Ex 95176: 15/1  Shrugs, scap squeezes, 10x each  Radial nerve glide, 10x  Table slides, flex, scap, 5-10x each    EDUCATION:   Pt given HEP of exercises performed today.    Goals:  Goals to be achieved by discharge, approx 12 weeks.    Patient will verbalize pain (0-10) = 0/10 at rest and 2/10 at worst with activity.    Patient will correctly perform home exercise program to progress current functional status.    Increase L shoulder AROM: abduct= 135*, ER= 85*    Pt will manage all dressing and bathing ADLs indep without signif compensatory mvmts.    Increase UE strength: L shoulder ext= 40#, wrist extn= 20#, thumb extn= 4/5, finger abduct= 4/5.    Increase /pinch so L is within 15% of the R.    QuickDASH score < 10% to demonstrate overall improved functional  abilities.      Pt will tolerate sleeping on the L side without difficulty from L shoulder.    Pt will be able to return to recreational activities with her grandchildren as she was prior able incl volleyball volleying.

## 2024-11-08 ENCOUNTER — TREATMENT (OUTPATIENT)
Dept: PHYSICAL THERAPY | Facility: CLINIC | Age: 69
End: 2024-11-08
Payer: MEDICARE

## 2024-11-08 DIAGNOSIS — M25.512 LEFT SHOULDER PAIN: Primary | ICD-10-CM

## 2024-11-08 DIAGNOSIS — M62.89 MUSCLE TIGHTNESS: ICD-10-CM

## 2024-11-08 PROCEDURE — 97140 MANUAL THERAPY 1/> REGIONS: CPT | Mod: GP | Performed by: PHYSICAL THERAPIST

## 2024-11-08 PROCEDURE — 97110 THERAPEUTIC EXERCISES: CPT | Mod: GP | Performed by: PHYSICAL THERAPIST

## 2024-11-08 NOTE — PROGRESS NOTES
Physical Therapy    Physical Therapy Treatment    Patient Name: Zina Hernandez  MRN: [id number]  Today's Date: 11/30/2024  Insurance:  Med [place] Medicare  0% coins, no ded, $3000 oop ($1917.54 met), $30 copay, bmn tania yr, no PA   Visit 2 of 20 (re-assess at 10)    Time Entry:   Time Calculation  Start Time: 0832  Stop Time: 0915  Time Calculation (min): 43 min     PT Therapeutic Procedures Time Entry  Manual Therapy Time Entry: 18  Therapeutic Exercise Time Entry: 23                 Current Problem  1. Left shoulder pain  Follow Up In Physical Therapy      2. Muscle tightness            Subjective    General   PT has been doing HEP as instructed, no issues other than shoulder blade area discomfort, though that just started this AM.      Precautions  Precautions  Precautions Comment: no falls since last visitRecent cancer  Pain       Objective     Treatments:  Ther Ex 87765: 23/2  Pulleys flex, scap, 10x each  Shrugs, scap squeezes, 10x each  Radial nerve glide, 10x  Table slides, flex, scap, 5-10x each  Supine 90-90 ER AROM 10x  Cane AAROM ER, ext, 10x each     Manual Ther Tech [id number]: 18/1  TPR to L serratus ant, post deltoid, subscap, lat dorsi  Scap ROM    OP EDUCATION:     Assessment:  After discussing the process of pt's radiation treatment, it was learned that she was holding sustained  with shoulder adduction and IR force for long periods of time (> 30 min at a time). Though this would normally have recovered over this amount of time, it is possible that the effect of the radiation prohibited that normal muscle recovery. At this time, program will focus on treating the mechanical issue of the now-known MARYAM. She responded well to MTT today, noting that the shoulder felt looser afterward. Add'l work to manage muscle tension and lengthening is recommended.     Plan:   Cont to progress as ashlee with gentle mobilization of the shoulder and scap stabilization.    Goals:

## 2024-11-15 ENCOUNTER — TREATMENT (OUTPATIENT)
Dept: PHYSICAL THERAPY | Facility: CLINIC | Age: 69
End: 2024-11-15
Payer: MEDICARE

## 2024-11-15 DIAGNOSIS — M25.512 LEFT SHOULDER PAIN: Primary | ICD-10-CM

## 2024-11-15 DIAGNOSIS — M62.89 MUSCLE TIGHTNESS: ICD-10-CM

## 2024-11-15 PROCEDURE — 97140 MANUAL THERAPY 1/> REGIONS: CPT | Mod: GP | Performed by: PHYSICAL THERAPIST

## 2024-11-15 PROCEDURE — 97110 THERAPEUTIC EXERCISES: CPT | Mod: GP | Performed by: PHYSICAL THERAPIST

## 2024-11-15 ASSESSMENT — PAIN - FUNCTIONAL ASSESSMENT: PAIN_FUNCTIONAL_ASSESSMENT: 0-10

## 2024-11-15 ASSESSMENT — PAIN SCALES - GENERAL: PAINLEVEL_OUTOF10: 5 - MODERATE PAIN

## 2024-11-15 NOTE — PROGRESS NOTES
Physical Therapy    Physical Therapy Treatment    Patient Name: Zina Hernandez  MRN: [id number]  Today's Date: 11/17/2024  Insurance:  Med [place] Medicare  0% coins, no ded, $3000 oop ($1917.54 met), $30 copay, bmn tania yr, no PA   Visit 3 of 20 (re-assess at 10)    Time Entry:   Time Calculation  Start Time: 0820  Stop Time: 0902  Time Calculation (min): 42 min     PT Therapeutic Procedures Time Entry  Manual Therapy Time Entry: 24  Therapeutic Exercise Time Entry: 18                 Current Problem  1. Left shoulder pain  Follow Up In Physical Therapy      2. Muscle tightness            Subjective    General  Pt notes that she did well after last session with MTT, but notes that the L thumb is bothering her today. It is clicking and so is the R thumb,    Precautions   Recent cancer.  Pain       Objective     Treatments:  Ther Ex 66773: 18/1  Pulleys flex, scap, 10x each  S/L RTC x4: ER, abd, flex, horiz abd; 0# 10x each  S/L and supine ABC; 1x each  Shrugs, scap squeezes, 10x each  Supine 90-90 ER AROM 10x  Cane AAROM ext, ER, 10x each    Manual Ther Tech [id number]: 24/2  TPR to L serratus ant, subscap,   Scap ROM  TPR to L thenar eminence  Passive stretching of thenar eminence  PROM/blocking for neutral CMC with IP flexion   post deltoid, subscap    OP EDUCATION:     Assessment:  Pt's thumb clicking and positioning is consistent with IP and CMC OA. Shoulder is much improved ROM-wise, but now has some arthrokinematics issues that are contributing to remaining strain and difficulty with movement. Adding inf glide mobs NV should be helpful with this.      Plan:   Cont to progress as ashlee with gentle mobilization of the shoulder and scap stabilization.    Goals:

## 2024-11-18 ENCOUNTER — HOSPITAL ENCOUNTER (OUTPATIENT)
Dept: CARDIOLOGY | Facility: HOSPITAL | Age: 69
Discharge: HOME | End: 2024-11-18
Payer: MEDICARE

## 2024-11-18 DIAGNOSIS — Z51.81 ENCOUNTER FOR MONITORING CARDIOTOXIC DRUG THERAPY: ICD-10-CM

## 2024-11-18 DIAGNOSIS — R07.9 CHEST PAIN, UNSPECIFIED TYPE: ICD-10-CM

## 2024-11-18 DIAGNOSIS — Z79.899 ENCOUNTER FOR MONITORING CARDIOTOXIC DRUG THERAPY: ICD-10-CM

## 2024-11-18 LAB
EJECTION FRACTION APICAL 4 CHAMBER: 63.9
EJECTION FRACTION: 65 %
GLOBAL LONGITUDINAL STRAIN: -20.7 %
LEFT VENTRICLE INTERNAL DIMENSION DIASTOLE: 3.8 CM (ref 3.5–6)
LV EJECTION FRACTION BIPLANE: 64 %
MITRAL VALVE E/A RATIO: 1.3

## 2024-11-18 PROCEDURE — 93321 DOPPLER ECHO F-UP/LMTD STD: CPT | Performed by: INTERNAL MEDICINE

## 2024-11-18 PROCEDURE — 93308 TTE F-UP OR LMTD: CPT | Performed by: INTERNAL MEDICINE

## 2024-11-18 PROCEDURE — 93325 DOPPLER ECHO COLOR FLOW MAPG: CPT | Performed by: INTERNAL MEDICINE

## 2024-11-18 PROCEDURE — 76376 3D RENDER W/INTRP POSTPROCES: CPT

## 2024-11-18 PROCEDURE — 76376 3D RENDER W/INTRP POSTPROCES: CPT | Performed by: INTERNAL MEDICINE

## 2024-11-18 PROCEDURE — 93356 MYOCRD STRAIN IMG SPCKL TRCK: CPT | Performed by: INTERNAL MEDICINE

## 2024-11-22 ENCOUNTER — TREATMENT (OUTPATIENT)
Dept: PHYSICAL THERAPY | Facility: CLINIC | Age: 69
End: 2024-11-22
Payer: MEDICARE

## 2024-11-22 ENCOUNTER — TELEPHONE (OUTPATIENT)
Dept: CARDIOLOGY | Facility: HOSPITAL | Age: 69
End: 2024-11-22
Payer: MEDICARE

## 2024-11-22 DIAGNOSIS — M25.512 LEFT SHOULDER PAIN: Primary | ICD-10-CM

## 2024-11-22 DIAGNOSIS — M62.89 MUSCLE TIGHTNESS: ICD-10-CM

## 2024-11-22 PROCEDURE — 97140 MANUAL THERAPY 1/> REGIONS: CPT | Mod: GP | Performed by: PHYSICAL THERAPIST

## 2024-11-22 PROCEDURE — 97110 THERAPEUTIC EXERCISES: CPT | Mod: GP | Performed by: PHYSICAL THERAPIST

## 2024-11-22 ASSESSMENT — PAIN - FUNCTIONAL ASSESSMENT: PAIN_FUNCTIONAL_ASSESSMENT: 0-10

## 2024-11-22 ASSESSMENT — PAIN SCALES - GENERAL: PAINLEVEL_OUTOF10: 1

## 2024-11-22 NOTE — PROGRESS NOTES
Physical Therapy    Physical Therapy Treatment    Patient Name: Zina Hernandez  MRN: [id number]  Today's Date: 11/23/2024  Insurance:  Med [place] Medicare  0% coins, no ded, $3000 oop ($1917.54 met), $30 copay, bmn tania yr, no PA   Visit 4 of 20 (re-assess at 10)    Time Entry:   Time Calculation  Start Time: 0821  Stop Time: 0910  Time Calculation (min): 49 min     PT Therapeutic Procedures Time Entry  Manual Therapy Time Entry: 15  Therapeutic Exercise Time Entry: 23                 Current Problem  1. Left shoulder pain  Follow Up In Physical Therapy      2. Muscle tightness          Subjective    General  Pt reports that her shoulder feels much better. She is no longer in pain in the tricep area, and not getting radiating pain in the LUE anymore. She has a little discomfort in the bicep area, and ongoing pain in the L thumb related to OA. She has found an exercise to help weith pain mgmt for that.     Precautions   Recent cancer.  Pain     Objective     Treatments:  Ther Ex 90593: 23/2  Pulleys flex, scap, 10x each  S/L RTC x4: ER, abd, flex, horiz abd; 1# 10x each  Shrugs, scap squeezes, 1# 10x each  S/L ABC 1#, 1x each    Not performed today:  Supine 90-90 ER AROM 10x  Cane AAROM ext, ER, 10x each    Manual Ther Tech [id number]: 15/1  Scap ROM  STM/DTM to L bicep  TPR to L bicep    OP EDUCATION:     Assessment:  Pt's bicep pain is likely related to ongoing aberrant shoulder mechanics that are leftover from her months of irregular use of the shoulder/UE. Progressed RTC exercises today to address this. Her original issue is substantially improved. Add'l scap stabilization and RTC strengthening is recommended to cont to improve her UE functional use.      Plan:   Cont to progress as ashlee with gentle mobilization of the shoulder and scap stabilization.    Goals:

## 2024-11-22 NOTE — TELEPHONE ENCOUNTER
----- Message from Ailyn Mason sent at 11/21/2024 11:27 PM EST -----  Echo shows normal LV function and strain. No change  ----- Message -----  From: Interface, [facility] - Cardiology Results In  Sent: 11/18/2024  10:11 PM EST  To: Ailyn Mason MD

## 2024-11-25 ENCOUNTER — APPOINTMENT (OUTPATIENT)
Dept: CARDIOLOGY | Facility: CLINIC | Age: 69
End: 2024-11-25
Payer: MEDICARE

## 2024-11-26 ENCOUNTER — TREATMENT (OUTPATIENT)
Dept: PHYSICAL THERAPY | Facility: CLINIC | Age: 69
End: 2024-11-26
Payer: MEDICARE

## 2024-11-26 DIAGNOSIS — M62.89 MUSCLE TIGHTNESS: ICD-10-CM

## 2024-11-26 DIAGNOSIS — M25.512 LEFT SHOULDER PAIN: Primary | ICD-10-CM

## 2024-11-26 PROCEDURE — 97140 MANUAL THERAPY 1/> REGIONS: CPT | Mod: GP | Performed by: PHYSICAL THERAPIST

## 2024-11-26 PROCEDURE — 97110 THERAPEUTIC EXERCISES: CPT | Mod: GP | Performed by: PHYSICAL THERAPIST

## 2024-11-26 ASSESSMENT — PAIN - FUNCTIONAL ASSESSMENT: PAIN_FUNCTIONAL_ASSESSMENT: 0-10

## 2024-11-26 ASSESSMENT — PAIN SCALES - GENERAL: PAINLEVEL_OUTOF10: 2

## 2024-11-26 NOTE — PROGRESS NOTES
Physical Therapy    Physical Therapy Treatment    Patient Name: Zina Hernandez  MRN: [id number]  Today's Date: 11/26/2024  Insurance:  Med [place] Medicare  0% coins, no ded, $3000 oop ($1917.54 met), $30 copay, bmn tania yr, no PA   Visit 5 of 20 (re-assess at 10)    Time Entry:   Time Calculation  Start Time: 1002  Stop Time: 1047  Time Calculation (min): 45 min     PT Therapeutic Procedures Time Entry  Manual Therapy Time Entry: 15  Therapeutic Exercise Time Entry: 25                 Current Problem  1. Left shoulder pain  Follow Up In Physical Therapy      2. Muscle tightness          Subjective    General  Pt notes that her L shoulder is a little stiff, referencing the shoulder itself and axilla going into her side thorax. Thumbs are still clicking, yair the L.    Precautions  Precautions  Precautions Comment: no falls since last visitRecent cancer.  Pain  Pain Assessment  Pain Assessment: 0-10  0-10 (Numeric) Pain Score: 2  Objective     Treatments:  Ther Ex 00147: 25/2  Pulleys flex, scap, 10x each  S/L RTC x4: ER, abd, flex, horiz abd; 2# 10x each  Tband IR, ER, red 15x each  Shrugs, scap squeezes, 2# 10x each  S/L ABC 1#, 2x each  Advised pt to use thumb spica splint or compression glove to assist with CMC OA.    Not performed today:  Supine 90-90 ER AROM 10x  Cane AAROM ext, ER, 10x each    Manual Ther Tech [id number]: 15/1  Scap ROM  TPR to L lev scap, rhomboids    OP EDUCATION:     Assessment:  Pt did well overall with program. She had some tenderness in the lev scap, but overall scap mobility wasn't too bad today. She was able to tolerate weight progressions on several exercises with noted fatigue, but no inc in pain. Add'l RTC strengthening should be helpful in reducing the arthritic strain and OA ache in the shoulder.      Plan:   Cont to progress as ashlee with gentle mobilization of the shoulder and scap stabilization.    Goals:

## 2024-12-02 ENCOUNTER — OFFICE VISIT (OUTPATIENT)
Dept: CARDIOLOGY | Facility: CLINIC | Age: 69
End: 2024-12-02
Payer: MEDICARE

## 2024-12-02 VITALS
OXYGEN SATURATION: 97 % | TEMPERATURE: 98.2 F | WEIGHT: 134.04 LBS | RESPIRATION RATE: 16 BRPM | HEART RATE: 87 BPM | BODY MASS INDEX: 27.07 KG/M2 | DIASTOLIC BLOOD PRESSURE: 78 MMHG | SYSTOLIC BLOOD PRESSURE: 132 MMHG

## 2024-12-02 DIAGNOSIS — Z78.9 NEVER SMOKED ANY SUBSTANCE: Primary | ICD-10-CM

## 2024-12-02 DIAGNOSIS — C50.912 INVASIVE LOBULAR CARCINOMA OF LEFT BREAST IN FEMALE: ICD-10-CM

## 2024-12-02 DIAGNOSIS — Z51.81 ENCOUNTER FOR MONITORING CARDIOTOXIC DRUG THERAPY: ICD-10-CM

## 2024-12-02 DIAGNOSIS — Z79.899 ENCOUNTER FOR MONITORING CARDIOTOXIC DRUG THERAPY: ICD-10-CM

## 2024-12-02 PROCEDURE — 99213 OFFICE O/P EST LOW 20 MIN: CPT | Performed by: INTERNAL MEDICINE

## 2024-12-02 PROCEDURE — 1160F RVW MEDS BY RX/DR IN RCRD: CPT | Performed by: INTERNAL MEDICINE

## 2024-12-02 PROCEDURE — 1126F AMNT PAIN NOTED NONE PRSNT: CPT | Performed by: INTERNAL MEDICINE

## 2024-12-02 PROCEDURE — 1159F MED LIST DOCD IN RCRD: CPT | Performed by: INTERNAL MEDICINE

## 2024-12-02 ASSESSMENT — ENCOUNTER SYMPTOMS
ARTHRALGIAS: 1
CARDIOVASCULAR NEGATIVE: 1
PSYCHIATRIC NEGATIVE: 1
DIZZINESS: 0
CONSTITUTIONAL NEGATIVE: 1
RESPIRATORY NEGATIVE: 1
GASTROINTESTINAL NEGATIVE: 1
LIGHT-HEADEDNESS: 0

## 2024-12-02 ASSESSMENT — PAIN SCALES - GENERAL: PAINLEVEL_OUTOF10: 0-NO PAIN

## 2024-12-02 NOTE — PATIENT INSTRUCTIONS
Follow-up in 1 year. Return sooner if chest pain/shortness of breath, or if you go back on any drug treatment.

## 2024-12-02 NOTE — PROGRESS NOTES
"  Patient:  Zina Hernandez  YOB: 1955  MRN: [id number]       Chief Complaint/Active Symptoms:       Zina Hernandez is a 69 y.o. female who returns today for cardiac follow-up.    The patient comes for follow-up after her \"wash-out\" echo. Doing well without chest pain or discomfort, shortness of breath. No palpitations, syncope or near syncope. Feeling well.     No new complaints, reviewed last echo.     Cancer Diagnosis: Left breast cancer, ER=, PR20%+, HER2+  Oncologist: Dr. Cony Amin  Treatment:  Paclitaxel and Herceptin, lumpectomy in August 2023.  To continue with Herceptin through October 2024 after radiation therapy. Now on exemestane.  herceptin every 3 weeks -2 treatments to go  Radiation: 4256 cGY (16 of 16 fractions) to left chest and axilla completed 2/2024  Risk factors: Age    Echo: Started with EF 65-70%, then dropped to 55-60%, most recently 60-65%.     Review of Systems   Constitutional: Negative.    HENT:  Positive for congestion.    Respiratory: Negative.     Cardiovascular: Negative.    Gastrointestinal: Negative.    Genitourinary: Negative.    Musculoskeletal:  Positive for arthralgias.   Neurological:  Negative for dizziness, syncope and light-headedness.   Psychiatric/Behavioral: Negative.     All other systems reviewed and are negative.      Objective:     Vitals:    12/02/24 1311   BP: 132/78   Pulse: 87   Resp: 16   Temp: 36.8 °C (98.2 °F)   SpO2: 97%       Vitals:    12/02/24 1311   Weight: 60.8 kg (134 lb 0.6 oz)       Allergies:     Allergies   Allergen Reactions    Paclitaxel Other     Back pain and chest pain.     Tamiflu [Oseltamivir] Unknown    Adhesive Tape-Silicones Rash          Medications:     Current Outpatient Medications   Medication Instructions    exemestane (AROMASIN) 25 mg, oral, Daily, Take after a meal.  Try to take at the same time each day.    losartan (COZAAR) 25 mg, oral, Daily       Physical Examination:   GENERAL:  Well developed, well nourished, " "in no acute distress.  HEENT: NC AT, EOMI with anicteric sclera  NECK:  Supple, no JVD, no bruit.  LUNGS:  Clear to auscultation bilaterally, normal respiratory effort.  HEART:  PMI is nondisplaced. RRR with normal S1 and S2, no S3, no mumur or rub. No carotid or abdominal bruits  EXTREMITIES:  Warm with good color, no clubbing or cyanosis.  There is no edema noted.  PERIPHERAL VASCULAR:  Pulses present and equally palpable; 2+ throughout.  MUSCULOSKELETAL: Mild OA changes  NEURO/PSYCH:  Alert and oriented times three with approppriate behavior and responses. Nonfocal motor examination with normal gait and ambulation  Skin: no rash or lesions on exposed skin or reported.    Lab:     CBC:   Lab Results   Component Value Date    WBC 4.4 10/28/2024    RBC 4.49 10/28/2024    HGB 12.6 10/28/2024    HCT 39.7 10/28/2024     10/28/2024        CMP:    Lab Results   Component Value Date     09/10/2024    K 3.5 09/10/2024     09/10/2024    CO2 29 09/10/2024    BUN 19 09/10/2024    CREATININE 1.16 (H) 09/10/2024    GLUCOSE 97 09/10/2024    CALCIUM 10.2 09/10/2024       Magnesium:    Lab Results   Component Value Date    MG 2.00 03/16/2022       Lipid Profile:    Lab Results   Component Value Date    TRIG 98 05/29/2024    HDL 66.3 05/29/2024    LDLCALC 80 05/29/2024       TSH:    Lab Results   Component Value Date    TSH 5.84 (H) 05/29/2024       BNP:   Lab Results   Component Value Date    BNP 7 05/07/2024        PT/INR:    Lab Results   Component Value Date    PROTIME 11.7 10/11/2023    INR 1.0 10/11/2023       HgBA1c:    No results found for: \"HGBA1C\"    BMP:  Lab Results   Component Value Date     09/10/2024     05/29/2024     02/13/2024    K 3.5 09/10/2024    K 4.2 05/29/2024    K 3.7 02/13/2024     09/10/2024     05/29/2024     02/13/2024    CO2 29 09/10/2024    CO2 31 05/29/2024    CO2 28 02/13/2024    BUN 19 09/10/2024    BUN 16 05/29/2024    BUN 14 02/13/2024    " "CREATININE 1.16 (H) 09/10/2024    CREATININE 1.12 (H) 05/29/2024    CREATININE 0.89 02/13/2024       Cardiac Enzymes:    Lab Results   Component Value Date    TROPHS <3 05/29/2024    TROPHS <3 05/07/2024    TROPHS <3 01/15/2024       Hepatic Function Panel:    Lab Results   Component Value Date    ALKPHOS 86 09/10/2024    ALT 17 09/10/2024    AST 23 09/10/2024    PROT 7.2 09/10/2024    BILITOT 0.4 09/10/2024         Diagnostic Studies:     Echo 11/18/2024 EF 65%, strain -20.7    No nuclear medicine results found for the past 12 months    No valid procedures specified.    EKG:   No results found for: \"EKG\"    Radiology:     No orders to display     ASSESSMENT     Problem List Items Addressed This Visit       Invasive lobular carcinoma of left breast in female    Relevant Orders    Follow Up In Cardiology    Encounter for monitoring cardiotoxic drug therapy    Relevant Orders    Follow Up In Cardiology    Never smoked any substance - Primary       PLAN     1.  Invasive lobular carcinoma of the left breast.  Status post cardiotoxic drug therapy.  The patient's ejection fraction has returned to her baseline.  Will see her back in a year and if everything is doing well we can talk about discontinuing medications if they are not needed for ongoing blood pressure management.  Would recommend that in 2026 we consider CT cardiac score is that is 2 years status post radiation to the left breast.  We reviewed these with the patient and also encouraged her to return if she has any symptoms suggestive of heart failure.    Will see her back in 1 year.              "

## 2024-12-04 ENCOUNTER — TELEPHONE (OUTPATIENT)
Dept: CARDIOLOGY | Facility: CLINIC | Age: 69
End: 2024-12-04
Payer: MEDICARE

## 2024-12-06 ENCOUNTER — TREATMENT (OUTPATIENT)
Dept: PHYSICAL THERAPY | Facility: CLINIC | Age: 69
End: 2024-12-06
Payer: MEDICARE

## 2024-12-06 DIAGNOSIS — M25.512 LEFT SHOULDER PAIN: Primary | ICD-10-CM

## 2024-12-06 DIAGNOSIS — M62.89 MUSCLE TIGHTNESS: ICD-10-CM

## 2024-12-06 PROCEDURE — 97110 THERAPEUTIC EXERCISES: CPT | Mod: GP | Performed by: PHYSICAL THERAPIST

## 2024-12-06 NOTE — PROGRESS NOTES
Physical Therapy    Physical Therapy Treatment    Patient Name: Zina Hernandez  MRN: [id number]  Today's Date: 12/6/2024  Insurance:  [employer] [place] Medicare  0% coins, no ded, $3000 oop ($1917.54 met), $30 copay, bmn tania yr, no PA   Visit 6 of 20 (re-assess at 10)    Time Entry:                           Current Problem  1. Left shoulder pain        2. Muscle tightness          Subjective    General  Pt reports that her shoulder is feeling substantially better, just still getting pain in the B thumb MCP joints.     Precautions   Recent cancer.  Pain     Objective     Treatments:  Ther Ex 04481: 38/3  Pulleys flex, scap, 10x each  Tband rows, LAE, red 10x each  Tband IR/ER, red 10x each  Shrugs, scap squeezes, 2# 10x each  S/L open book stretch, :05 5x    Not performed today:  Supine 90-90 ER AROM 10x  Cane AAROM ext, ER, 10x each  S/L RTC x4: ER, abd, flex, horiz abd; 2# 10x each  S/L ABC 1#, 2x each,    Manual Ther Tech [id number]: ---      OP EDUCATION:     Assessment:  Pt ashlee program well today. She reported that she will not be able to continue with PT after end of the year, and though it doesn't seem like that amount of time in PT will be necessary, spent time progressing program with Tband to help her transition to more home-ready exercises. She did well with them, and did not have inc pain.      Plan:   Cont to progress as ashlee with gentle mobilization of the shoulder and scap stabilization.    Goals:

## 2024-12-10 ENCOUNTER — APPOINTMENT (OUTPATIENT)
Dept: HEMATOLOGY/ONCOLOGY | Facility: CLINIC | Age: 69
End: 2024-12-10
Payer: MEDICARE

## 2024-12-13 ENCOUNTER — TREATMENT (OUTPATIENT)
Dept: PHYSICAL THERAPY | Facility: CLINIC | Age: 69
End: 2024-12-13
Payer: MEDICARE

## 2024-12-13 DIAGNOSIS — M62.89 MUSCLE TIGHTNESS: ICD-10-CM

## 2024-12-13 DIAGNOSIS — M25.512 LEFT SHOULDER PAIN: Primary | ICD-10-CM

## 2024-12-13 PROCEDURE — 97110 THERAPEUTIC EXERCISES: CPT | Mod: GP | Performed by: PHYSICAL THERAPIST

## 2024-12-13 ASSESSMENT — PAIN - FUNCTIONAL ASSESSMENT: PAIN_FUNCTIONAL_ASSESSMENT: 0-10

## 2024-12-13 ASSESSMENT — PAIN SCALES - GENERAL: PAINLEVEL_OUTOF10: 5 - MODERATE PAIN

## 2024-12-13 NOTE — PROGRESS NOTES
Physical Therapy    Physical Therapy Treatment    Patient Name: Zina Hernandez  MRN: [id number]  Today's Date: 12/13/2024  Insurance:  Med [place] Medicare  0% coins, no ded, $3000 oop ($1917.54 met), $30 copay, bmn tania yr, no PA   Visit 6 of 20 (re-assess at 10)    Time Entry:   Time Calculation  Start Time: 0825  Stop Time: 0906  Time Calculation (min): 41 min     PT Therapeutic Procedures Time Entry  Therapeutic Exercise Time Entry: 38                 Current Problem  1. Left shoulder pain  Follow Up In Physical Therapy      2. Muscle tightness          Subjective    General  Pt reports that the shoulder is doing well, not painful. She has signif pain in the L thumb MCP joint, 5/10. She notes that it was worse yesterday, and states that she gets cracking and popping in the joint that is also very uncomfortable.     Precautions   Recent cancer.  Pain  Pain Assessment  Pain Assessment: 0-10  0-10 (Numeric) Pain Score: 5 - Moderate pain  Objective     Treatments:  Ther Ex 59822: 38/3  Finger Ladder flex, abduct, 5x each  S/L open book stretch, :05 5x  S/L RTC x4: ER, abd, flex, horiz abd; 2# 10x each  Supine cane OH flexion. stretch, 10x  Jobes against wall x3 0#, 10x each  Seated bent over B LAE, abduct, 0# 10x each      Not performed today:  Supine 90-90 ER AROM 10x  Cane AAROM ext, ER, 10x each  Pulleys flex, scap, 10x each  Tband rows, LAE, red 10x each  Tband IR/ER, red 10x each  Shrugs, scap squeezes, 2# 10x each  S/L ABC 1#, 2x     Manual Ther Tech [id number]: ---      OP EDUCATION:     Assessment:  Modifications were made to today's program to address painful L thumb MCP joint. Modifications went well, and she was able to get through program with little to no thumb issues.   Pt was notified that her thumb condition might be best attended to by hand specialist and OT, so gave her reference names for that, and recommended a brace that heats to assist with OA relief and stabilizing the joint as well.      Plan:    Cont to progress as ashlee with gentle mobilization of the shoulder and scap stabilization.    Goals:

## 2024-12-16 ENCOUNTER — TELEPHONE (OUTPATIENT)
Dept: ADMISSION | Facility: HOSPITAL | Age: 69
End: 2024-12-16
Payer: MEDICARE

## 2024-12-16 DIAGNOSIS — C50.412 MALIGNANT NEOPLASM OF UPPER-OUTER QUADRANT OF LEFT BREAST IN FEMALE, ESTROGEN RECEPTOR POSITIVE: ICD-10-CM

## 2024-12-16 DIAGNOSIS — Z17.0 MALIGNANT NEOPLASM OF UPPER-OUTER QUADRANT OF LEFT BREAST IN FEMALE, ESTROGEN RECEPTOR POSITIVE: ICD-10-CM

## 2024-12-16 RX ORDER — EXEMESTANE 25 MG/1
25 TABLET ORAL DAILY
Qty: 30 TABLET | Refills: 3 | Status: CANCELLED | OUTPATIENT
Start: 2024-12-16 | End: 2025-12-16

## 2024-12-17 ENCOUNTER — OFFICE VISIT (OUTPATIENT)
Dept: HEMATOLOGY/ONCOLOGY | Facility: CLINIC | Age: 69
End: 2024-12-17
Payer: MEDICARE

## 2024-12-17 VITALS
RESPIRATION RATE: 18 BRPM | DIASTOLIC BLOOD PRESSURE: 83 MMHG | WEIGHT: 134.7 LBS | HEART RATE: 80 BPM | BODY MASS INDEX: 27.21 KG/M2 | SYSTOLIC BLOOD PRESSURE: 173 MMHG | OXYGEN SATURATION: 95 % | TEMPERATURE: 97 F

## 2024-12-17 DIAGNOSIS — C50.412 MALIGNANT NEOPLASM OF UPPER-OUTER QUADRANT OF LEFT BREAST IN FEMALE, ESTROGEN RECEPTOR POSITIVE: ICD-10-CM

## 2024-12-17 DIAGNOSIS — C50.912 INVASIVE LOBULAR CARCINOMA OF LEFT BREAST IN FEMALE: ICD-10-CM

## 2024-12-17 DIAGNOSIS — Z17.0 MALIGNANT NEOPLASM OF UPPER-OUTER QUADRANT OF LEFT BREAST IN FEMALE, ESTROGEN RECEPTOR POSITIVE: ICD-10-CM

## 2024-12-17 PROCEDURE — 99214 OFFICE O/P EST MOD 30 MIN: CPT | Performed by: STUDENT IN AN ORGANIZED HEALTH CARE EDUCATION/TRAINING PROGRAM

## 2024-12-17 PROCEDURE — 1159F MED LIST DOCD IN RCRD: CPT | Performed by: STUDENT IN AN ORGANIZED HEALTH CARE EDUCATION/TRAINING PROGRAM

## 2024-12-17 PROCEDURE — 1036F TOBACCO NON-USER: CPT | Performed by: STUDENT IN AN ORGANIZED HEALTH CARE EDUCATION/TRAINING PROGRAM

## 2024-12-17 PROCEDURE — 1126F AMNT PAIN NOTED NONE PRSNT: CPT | Performed by: STUDENT IN AN ORGANIZED HEALTH CARE EDUCATION/TRAINING PROGRAM

## 2024-12-17 RX ORDER — EXEMESTANE 25 MG/1
25 TABLET ORAL DAILY
Qty: 30 TABLET | Refills: 5 | Status: SHIPPED | OUTPATIENT
Start: 2024-12-17 | End: 2025-12-17

## 2024-12-17 ASSESSMENT — PAIN SCALES - GENERAL: PAINLEVEL_OUTOF10: 0-NO PAIN

## 2024-12-17 NOTE — PROGRESS NOTES
Patient ID: Zina Hernandez is a 69 y.o. female.    The patient presents to clinic today for her history of breast cancer.     Cancer Staging   Malignant neoplasm of upper-outer quadrant of left breast in female, estrogen receptor positive  Staging form: Breast, AJCC 8th Edition  - Pathologic: Stage IA (pT1c, pN0, cM0, G2, ER+, ID+, HER2+) - Signed by Mirian Blanchard DO on 6/24/2024        Diagnostic/Therapeutic History:    The patient presents to clinic today for her history of breast cancer.     Diagnostic/Therapeutic History:  Cancer History:          Breast         AJCC Edition: 8th (AJCC), Diagnosis Date: 30-Aug-2023, IA, pT1c pN0 cM0 G2     Treatment Synopsis:    - On 6/14/2023 screening mammogram she had 2 adjacent irregular spiculated masses in the upper outer left breast at middle depth.  No suspicious masses or calcification  in the right breast.  BI-RADS Category 0     -On 6/28/2023 she had targeted ultrasound that showed at 1:00, 4 cm from the nipple an irregular hypoechoic mass measuring 3 x 5 x 4 mm this corresponds to the mammographic finding.  Left axilla showed 4 morphologically normal lymph nodes without lymphadenopathy      -On 7/14/2023 she had left breast mass ultrasound guided core needle  invasive lobular carcinoma, grade 2 with  ER 95%, ID 20%, HER2 positive 3+, also had lobular carcinoma in situ     -On 8/30/2023 Dr. Blanchard performed left partial mastectomy with sentinel lymph node biopsy (0/1) 2 foci of cancer largest 1 measuring 13 mm, second 1 measuring 7 mm, margins were negative final stage [id number]     -on 01/02/2024: completed TH    - On 02/20/2024: completed radiation therapy    - 06/24/2024: mammogram negative     - completed herceptin in October 2024      History of Present Illness (HPI)/Interval History:    Doing overall okay. Does have intermittent joint pains.    14 point review of system otherwise unremarkable     PMH: as above     Meds: allergies reviewed      Reproductive  history: Menarche at 13, AFLB: 27,  menopause at 50, no HRT     Family history maternal grandmother with uterine cancer in her 60s    She denies any fevers or chills.      Review of Systems:  14-point ROS otherwise negative, as per HPI.    Past Medical History:   Diagnosis Date    Breast cancer (Multi)     Cancer (Multi)     Hx antineoplastic chemo     Migraine     Other chest pain 01/15/2024    Personal history of irradiation     Urinary tract infection     Varicella        Past Surgical History:   Procedure Laterality Date    BREAST LUMPECTOMY         Social History     Socioeconomic History    Marital status:    Tobacco Use    Smoking status: Never    Smokeless tobacco: Never   Vaping Use    Vaping status: Never Used   Substance and Sexual Activity    Alcohol use: Never    Drug use: Never    Sexual activity: Yes     Partners: Male     Birth control/protection: None     Comment: took birth control pills for 15 years     Social Drivers of Health     Financial Resource Strain: Low Risk  (6/2/2024)    Received from [facility]    Overall Financial Resource Strain (CARDIA)     Difficulty of Paying Living Expenses: Not hard at all   Food Insecurity: No Food Insecurity (6/2/2024)    Received from [facility]    Hunger Vital Sign     Worried About Running Out of Food in the Last Year: Never true     Ran Out of Food in the Last Year: Never true   Transportation Needs: No Transportation Needs (6/2/2024)    Received from [facility]    PRAPARE - Transportation     Lack of Transportation (Medical): No     Lack of Transportation (Non-Medical): No   Physical Activity: Patient Declined (6/2/2024)    Received from [facility]    Exercise Vital Sign     Days of Exercise per Week: Patient declined     Minutes of Exercise per Session: Patient declined   Stress: Stress Concern Present (6/2/2024)    Received from [facility]  Healthcare    [facility] of Occupational Health - Occupational Stress Questionnaire     Feeling of Stress : To some extent   Social Connections: Moderately Integrated (6/2/2024)    [facility]    Social Connection and Isolation Panel [NHANES]     Frequency of Communication with Friends and Family: More than three times a week     Frequency of Social Gatherings with Friends and Family: More than three times a week     Attends Rastafari Services: 1 to 4 times per year     Active Member of Clubs or Organizations: No     Attends Club or Organization Meetings: Patient declined     Marital Status: Living with partner   Housing Stability: Low Risk  (6/2/2024)    Received from [facility], [facility]    Housing Stability Vital Sign     Unable to Pay for Housing in the Last Year: No     Number of Places Lived in the Last Year: 1     Unstable Housing in the Last Year: No       Allergies   Allergen Reactions    Paclitaxel Other     Back pain and chest pain.     Tamiflu [Oseltamivir] Unknown    Adhesive Tape-Silicones Rash         Current Outpatient Medications:     exemestane (Aromasin) 25 mg tablet, Take 1 tablet (25 mg total) by mouth once daily.  Take after a meal.  Try to take at the same time each day., Disp: 30 tablet, Rfl: 3    losartan (Cozaar) 25 mg tablet, Take 1 tablet (25 mg) by mouth once daily., Disp: 90 tablet, Rfl: 3     Objective    BSA: 1.59 meters squared  /83 (BP Location: Left arm, Patient Position: Sitting, BP Cuff Size: Adult)   Pulse 80   Temp 36.1 °C (97 °F) (Temporal)   Resp 18   Wt 61.1 kg (134 lb 11.2 oz)   SpO2 95%   BMI 27.21 kg/m²     ECOG Performance Status: 0    Physical Exam    Constitutional: Well developed, awake/alert/oriented  x3, no distress, alert and cooperative   Eyes: PERRL, EOMI, clear sclera   ENMT: mucous membranes moist, no apparent injury,  no lesions seen   Head/Neck: Neck supple, no apparent injury, thyroid  without  mass or tenderness, No JVD, trachea midline, no bruits   Respiratory/Thorax: Patent airways, CTAB, normal  breath sounds with good chest expansion, thorax symmetric   Cardiovascular: Regular, rate and rhythm, no murmurs,  2+ equal pulses of the extremities, normal S 1and S 2   Gastrointestinal: Nondistended, soft, non-tender,  no rebound tenderness or guarding, no masses palpable, no organomegaly, +BS, no bruits   Extremities: normal extremities, no cyanosis edema,  contusions or wounds, no clubbing   Breast: Left surgical incision healed well,  no new nodules or lymphadenopathy.  No right breast nodules or lymphadenopathy        Laboratory Data:  Lab Results   Component Value Date    WBC 4.4 10/28/2024    HGB 12.6 10/28/2024    HCT 39.7 10/28/2024    MCV 88 10/28/2024     10/28/2024    ANC 2.31 11/21/2023       Chemistry    Lab Results   Component Value Date/Time     09/10/2024 0918    K 3.5 09/10/2024 0918     09/10/2024 0918    CO2 29 09/10/2024 0918    BUN 19 09/10/2024 0918    CREATININE 1.16 (H) 09/10/2024 0918    Lab Results   Component Value Date/Time    CALCIUM 10.2 09/10/2024 0918    ALKPHOS 86 09/10/2024 0918    AST 23 09/10/2024 0918    ALT 17 09/10/2024 0918    BILITOT 0.4 09/10/2024 0918             Radiology:  [facility]Echo [facility] (Strain And 3D)              [facility]  [address] [address]     Tel [phone] Fax [phone]    TRANSTHORACIC ECHOCARDIOGRAM REPORT    Patient Name:       HUNTER VELA    Reading Physician:    [id number] Mason MD  Study Date:         11/18/2024          Ordering Provider:    Glenda MASON  MRN/PID:            [id number]            Fellow:  Accession#:         [id number]        Nurse:  Date of Birth/Age:  1955 / 69      Sonographer:          Sidra Rai                       years                                     RDCS  Gender Assigned at  F                   Additional Staff:  Birth:  Height:             149.86 cm           Admit Date:  Weight:             58.97 kg            Admission Status:     Outpatient  BSA / BMI:          1.54 m2 / 26.26     Department Location:  [facility]                      kg/m2  Blood Pressure: 139 /66 mmHg    Study Type:    ONCO-ECHO LIMITED (STRAIN AND 3D)  Diagnosis/ICD: Encounter for monitoring cardiotoxic drug therapy-Z51.81  Indication:    Encounter for drug therapy  CPT Codes:     Echo Limited-61433   Study Detail: The following Echo studies were performed: 2D, Doppler and color                flow. Technically challenging study due to poor acoustic windows.       PHYSICIAN INTERPRETATION:  Left Ventricle: Left ventricular ejection fraction is normal, calculated by 3D at 65%. There are no regional wall motion abnormalities. The left ventricular cavity size is normal. There is normal septal and mildly increased posterior left ventricular wall thickness. There is no evidence of left ventricular hypertrophy. There is left ventricular concentric remodeling. Left Ventricular Global Longitudinal Strain - -20.7 %. Spectral Doppler shows a normal pattern of left ventricular diastolic filling. There is no definite left ventricular thrombus visualized. The intraventricular septum appears intact without evidence of shunting or a ventricular septal defect.  Left Atrium: The left atrium is normal in size.  Right Ventricle: The right ventricle is normal in size. There is normal right ventricular global systolic function.  Right Atrium: The right atrium is normal in size.  Aortic Valve: The aortic valve was not assessed. Aortic valve regurgitation was not assessed.  Mitral Valve: The mitral valve is mildly thickened. There is no evidence of mitral valve prolapse. There is mild to moderate mitral annular calcification. Mitral valve regurgitation was not  assessed.  Tricuspid Valve: The tricuspid valve is structurally normal. Tricuspid regurgitation was not assessed.  Pulmonic Valve: The pulmonic valve was not assessed. The pulmonic valve regurgitation was not assessed.  Pericardium: No pericardial effusion noted.  Aorta: The aortic root is normal.  Systemic Veins: The inferior vena cava was not assessed.  In comparison to the previous echocardiogram(s): Compared with study dated 8/21/2024,. No significant change.     ONCO-CARDIOLOGY:  Machine: This study was performed on the Tam[employer] Epic.  Previous Study: The patient had a previous Onco-Cardiology echocardiogram dated  8/21/2024.       Onco-Cardiology Measurements:  Historical Measurements from Previous Study  2D EF (Biplane)                     63%  3D EF                               64%  Global Longitudinal Strain (GLS) -19.3%    Current Measurements  2D EF (Biplane)                     64%  3D EF                               68%  Global Longitudinal Strain (GLS) -20.7%    GLS Tracking Quality:       CONCLUSIONS:   1. Left ventricular ejection fraction is normal, calculated by 3D at 65%.   2. Intact intraventricular septum without shunting or a ventricular septal defect.   3. No left ventricular thrombus visualized.   4. There is no evidence of left ventricular hypertrophy.   5. There is normal right ventricular global systolic function.   6. No evidence of mitral valve prolapse.   7. No significant change.    QUANTITATIVE DATA SUMMARY:     2D MEASUREMENTS:           Normal Ranges:  IVSd:            0.80 cm   (0.6-1.1cm)  LVPWd:           0.90 cm   (0.6-1.1cm)  LVIDd:           3.80 cm   (3.9-5.9cm)  LVIDs:           2.10 cm  LV Mass Index:   60.8 g/m2  LV % FS          44.7 %       LV SYSTOLIC FUNCTION BY 2D PLANIMETRY (MOD):                                          Normal Ranges:  EF-A4C View:                      64 %  (>=55%)  EF-A2C View:                      64 %  EF-Biplane:                       64  %  EF-3DQ:                           65 %  LV EF Reported:                   65 %  Global Longitudinal Strain (GLS): -21 %       LV DIASTOLIC FUNCTION:            Normal Ranges:  MV Peak A:             0.79 m/s   (0.42-0.7 m/s)  E/A Ratio:             1.30       (1.0-2.2)  MV e'                  0.101 m/s  (>8.0)  MV lateral e'          0.10 m/s  MV medial e'           0.10 m/s  PulmV Sys Steve:         40.70 cm/s  PulmV Edge Steve:        39.00 cm/s  PulmV S/D Steve:         1.00  PulmV A Revs Steve:      19.70 cm/s  PulmV A Revs Dur:      87.00 msec       MITRAL VALVE:          Normal Ranges:  MV DT:        246 msec (150-240msec)       Pulmonary Veins:  PulmV A Revs Dur: 87.00 msec  PulmV A Revs Steve: 19.70 cm/s  PulmV Edge Steve:   39.00 cm/s  PulmV S/D Steve:    1.00  PulmV Sys Steve:    40.70 cm/s       [id number] Ailyn Mason MD  Electronically signed on 11/18/2024 at 10:11:20 PM       ** Final **       BI mammo bilateral screening tomosynthesis 06/14/2023    Narrative  Interpreted By:  ENZO MANUEL MD  MRN: [id number]  Patient Name: HUNTER VELA    STUDY:  DIGITAL MAMM SCREENING W/ RUBI;  6/14/2023 8:00 am    ACCESSION NUMBER(S):  [id number]    ORDERING CLINICIAN:  KATIE HINOJOSA    INDICATION:  Screening.    COMPARISON:  09/08/2021, 07/17/2020, 01/17/2019    FINDINGS:  2D and tomosynthesis images were reviewed at 1 mm slice thickness.    There are areas of scattered fibroglandular tissue.  There are 2  adjacent irregular spiculated equal density masses in the upper outer  left breast at middle depth. No suspicious masses or calcifications  are identified in the right breast.    Impression  No mammographic evidence of malignancy in the right breast. Left  breast mass x2.    BI-RADS CATEGORY:    Category: 0 - Incomplete; Need Additional Imaging Evaluation and/or  Prior Mammograms for Comparison.  Recommendation: Ultrasound Recommended.    For any future breast imaging appointments, please call  [phone]  ([phone]).    Patient letter sent SADEVAL          Assessment/Plan:    69-year-old female previously healthy found to have on screening mammogram 2 adjacent irregular spiculated masses in the upper outer left breast at middle depth with  US showing a 5mm mass with left axilla showing 4 negative LN s/p  guided core needle  invasive lobular carcinoma, grade 2 with  ER 95%, ND 20%, HER2 positive 3+, also had lobular carcinoma in situ. Dr. Blanchard performed left partial mastectomy with sentinel  lymph node biopsy (0/1) 2 foci of cancer largest 1 measuring 13 mm, second 1 measuring 7 mm, margins were negative final stage PT1CN0. She is presenting to discuss adjuvant treatment options.      I reviewed with her the events that led to her diagnosis of breast cancer. We reviewed all the procedures and diagnostic imaging she underwent thus far. I discussed the features of her breast cancer that include the size, grade, lymph node status and  hormone receptor/ her2-ying status.     Based on APT trial recommended to proceed with THx12 weeks followed by radiation followed by hormonal therapy and herceptin to complete for a total of 1 year     Based on the updated 10 year results of the APT trial published in the lancet in March of this year:      410 patients were enrolled and 406 were given adjuvant paclitaxel and trastuzumab and included in the analysis. Mean age at enrolment was 55 years (SD 10·5), 405 (99·8%) of 406 patients were female and one (0·2%) was male, 350 (86·2%) were  White, 28 (6·9%) were Black or , and 272 (67·0%) had hormone receptor-positive disease. After a median follow-up of 10·8 years (IQR 7·1-11·4), among 406 patients included in the analysis population, we observed 31  invasive disease-free survival events, of which six (19·4%) were locoregional ipsilateral recurrences, nine (29·0%) were new contralateral breast cancers, six (19·4%) were distant recurrences, and ten (32·3%) were  all-cause deaths.  10-year invasive disease-free survival was 91·3% (95% CI 88·3-94·4), 10-year recurrence-free interval was 96·3% (95% CI 94·3-98·3), 10-year overall survival was 94·3% (95% CI 91·8-96·8), and 10-year breast  cancer-specific survival was 98·8% (95% CI 97·6-100)    on 01/02/2024: completed TH-     On 02/20/2024: completed radiation therapy    Oct 2024: completed herceptin    Echo showed 12% Decrease In EF- New echo: 08/21/2024: EF: 60-65%- Last echo on 11/18/2024  Losartan 12.5 mg daily  Continue  exemestane (refill sent)    RTC in     At least 35 minutes of direct consultation was spent reviewing the patient's chart as well as discussing and  reviewing the  cancer care plan including educating and answering  questions and concerns, greater than 50 percent spent in counseling and coordination  of care.            Cony Amin MD  Hematology and Medical Oncology  [facility]

## 2024-12-20 ENCOUNTER — APPOINTMENT (OUTPATIENT)
Dept: PHYSICAL THERAPY | Facility: CLINIC | Age: 69
End: 2024-12-20
Payer: MEDICARE

## 2024-12-20 DIAGNOSIS — M62.89 MUSCLE TIGHTNESS: ICD-10-CM

## 2024-12-20 DIAGNOSIS — M25.512 LEFT SHOULDER PAIN: Primary | ICD-10-CM

## 2024-12-27 ENCOUNTER — TREATMENT (OUTPATIENT)
Dept: PHYSICAL THERAPY | Facility: CLINIC | Age: 69
End: 2024-12-27
Payer: MEDICARE

## 2024-12-27 DIAGNOSIS — M25.512 LEFT SHOULDER PAIN: Primary | ICD-10-CM

## 2024-12-27 DIAGNOSIS — M62.89 MUSCLE TIGHTNESS: ICD-10-CM

## 2024-12-27 PROCEDURE — 97110 THERAPEUTIC EXERCISES: CPT | Mod: GP | Performed by: PHYSICAL THERAPIST

## 2024-12-27 ASSESSMENT — PAIN SCALES - GENERAL: PAINLEVEL_OUTOF10: 0 - NO PAIN

## 2024-12-27 ASSESSMENT — PAIN - FUNCTIONAL ASSESSMENT: PAIN_FUNCTIONAL_ASSESSMENT: 0-10

## 2024-12-27 NOTE — PROGRESS NOTES
Physical Therapy    Physical Therapy Treatment    Patient Name: Zina Hernandez  MRN: [id number]  Today's Date: 12/28/2024  Insurance:  Med [place] Medicare  0% coins, no ded, $3000 oop ($1917.54 met), $30 copay, bmn tania yr, no PA   Visit 7 of 20 (re-assess at 10)    Time Entry:   Time Calculation  Start Time: 0815  Stop Time: 0915  Time Calculation (min): 60 min     PT Therapeutic Procedures Time Entry  Therapeutic Exercise Time Entry: 54                 Current Problem  1. Left shoulder pain  Follow Up In Physical Therapy      2. Muscle tightness          Subjective    General  Pt reports that her shoulder is doing very well, still needs to stretch it from time to time, but it is no longer a limiting factor for her. She reports that her thumb joints feel much better after using heated glove that was recommended last visit. She cont to have thumb joint pain with power .    Precautions   Recent cancer.    Pain     Objective     Treatments:  Ther Ex 09100: 54/4  *updated goals*  Pulleys flex, scap, 10x each  Shrugs, scap squeezes, 2# 15x each  S/L open book stretch, :05 5x  S/L RTC x4: ER, abd, flex, horiz abd; 2# 10x each  Seated bent over B rows, LAE, abduct, 1# 10x each  Bicep curls x3, 2# 5x each  *spent time discussing adaptive equipment that will make  less painful for her.  Mike x3 1#, 10x each    OP EDUCATION:     Assessment:  Pt did well with program today, showing little to no limitation with L shoulder. She has met many of her goals related tot he shoulder, and at this time, it seems that her L thumb MCP and CMC joints are holding her back the most. She has learned some tools to manage her pain and stretches to cont to improve her mobility.   She has a good knowledge of her exercises to be able to cont independently at this time with reasonable expectation of success.      Plan:  Discharge to [facility].    Goals:  Goals to be achieved by discharge, approx 12 weeks.     Patient will verbalize pain (0-10) =  0/10 at rest and 2/10 at worst with activity.   (12-: shoulder= 0/10)    Patient will correctly perform home exercise program to progress current functional status.    (12-: doing HEP 3-4x/week due to holiday)    Increase L shoulder AROM: abduct= 135*, ER= 85*    (12-: abduct= 135*, ER= 86*)    Pt will manage all dressing and bathing ADLs indep without signif compensatory mvmts.    (12-: shoulder no longer limits ADLs, but thumb makes dressing challenging.)    Increase UE strength: L shoulder ext= 40#, wrist extn= 20#, thumb extn= 4/5, finger abduct= 4/5.    (12-: not tested)    Increase /pinch so L is within 15% of the R.   (12-: not tested to avoid irritating CMC/MCP OA)    QuickDASH score < 10% to demonstrate overall improved functional abilities.      (12-: 16%- score still lower due to thumb pathology)    Pt will tolerate sleeping on the L side without difficulty from L shoulder.   (12-: able to sleep on L side without signif difficulty)     Pt will be able to return to recreational activities with her grandchildren as she was prior able incl volleyball volleying.    (12-: She modifies the activities, but she has returned to playing with grandkids without signif limitation)

## 2025-02-24 ENCOUNTER — TELEPHONE (OUTPATIENT)
Dept: HEMATOLOGY/ONCOLOGY | Facility: HOSPITAL | Age: 70
End: 2025-02-24
Payer: MEDICARE

## 2025-02-24 NOTE — TELEPHONE ENCOUNTER
Per Dr. Amin:    Those are typically done after a bone marrow transplantation. In her case, she should get the regular vaccination as per her pcp. No contraindications from my standpoint.       Attempted to call patient to relay the message as above. Detailed message left on identified line with call back number should she have any additional questions or concerns.

## 2025-02-24 NOTE — TELEPHONE ENCOUNTER
Patient sent the following message via [employer]:    I would like to know if I need to have the above vaccines redone since receiving chemo and radiation. I read where it states that with some cancers after receiving chemo and radiation you need to have them redone because you lost immunity and are no longer protected against these diseases.     Let me know if based on my type of cancer and the type of chemo and radiation if I need to get my vaccines again or if I am still protected and the chemo and radiation did not undo my protection against those diseases.     Thank you,     Zina Hernandez       Verifying with team that this applies to BMT patients. Message sent to team.

## 2025-03-12 DIAGNOSIS — Z51.81 ENCOUNTER FOR MONITORING CARDIOTOXIC DRUG THERAPY: ICD-10-CM

## 2025-03-12 DIAGNOSIS — I11.9 BENIGN HYPERTENSIVE HEART DISEASE WITHOUT CONGESTIVE HEART FAILURE: ICD-10-CM

## 2025-03-12 DIAGNOSIS — Z79.899 ENCOUNTER FOR MONITORING CARDIOTOXIC DRUG THERAPY: ICD-10-CM

## 2025-03-13 RX ORDER — LOSARTAN POTASSIUM 25 MG/1
25 TABLET ORAL DAILY
Qty: 90 TABLET | Refills: 3 | Status: SHIPPED | OUTPATIENT
Start: 2025-03-13 | End: 2026-03-13

## 2025-04-15 ENCOUNTER — HOSPITAL ENCOUNTER (OUTPATIENT)
Dept: RADIATION ONCOLOGY | Facility: CLINIC | Age: 70
Setting detail: RADIATION/ONCOLOGY SERIES
Discharge: HOME | End: 2025-04-15
Payer: MEDICARE

## 2025-04-15 VITALS
TEMPERATURE: 97.7 F | SYSTOLIC BLOOD PRESSURE: 157 MMHG | BODY MASS INDEX: 27.61 KG/M2 | DIASTOLIC BLOOD PRESSURE: 73 MMHG | WEIGHT: 136.69 LBS | HEART RATE: 74 BPM | OXYGEN SATURATION: 98 % | RESPIRATION RATE: 18 BRPM

## 2025-04-15 DIAGNOSIS — C50.912 INVASIVE LOBULAR CARCINOMA OF LEFT BREAST IN FEMALE: ICD-10-CM

## 2025-04-15 DIAGNOSIS — Z17.0 MALIGNANT NEOPLASM OF UPPER-OUTER QUADRANT OF LEFT BREAST IN FEMALE, ESTROGEN RECEPTOR POSITIVE: Primary | ICD-10-CM

## 2025-04-15 DIAGNOSIS — Z79.811 AROMATASE INHIBITOR USE: ICD-10-CM

## 2025-04-15 DIAGNOSIS — C50.412 MALIGNANT NEOPLASM OF UPPER-OUTER QUADRANT OF LEFT BREAST IN FEMALE, ESTROGEN RECEPTOR POSITIVE: Primary | ICD-10-CM

## 2025-04-15 PROCEDURE — 99213 OFFICE O/P EST LOW 20 MIN: CPT | Performed by: NURSE PRACTITIONER

## 2025-04-15 ASSESSMENT — PAIN SCALES - GENERAL: PAINLEVEL_OUTOF10: 1

## 2025-04-15 ASSESSMENT — COLUMBIA-SUICIDE SEVERITY RATING SCALE - C-SSRS
6. HAVE YOU EVER DONE ANYTHING, STARTED TO DO ANYTHING, OR PREPARED TO DO ANYTHING TO END YOUR LIFE?: NO
2. HAVE YOU ACTUALLY HAD ANY THOUGHTS OF KILLING YOURSELF?: NO
1. IN THE PAST MONTH, HAVE YOU WISHED YOU WERE DEAD OR WISHED YOU COULD GO TO SLEEP AND NOT WAKE UP?: NO

## 2025-04-15 ASSESSMENT — PATIENT HEALTH QUESTIONNAIRE - PHQ9
2. FEELING DOWN, DEPRESSED OR HOPELESS: NOT AT ALL
1. LITTLE INTEREST OR PLEASURE IN DOING THINGS: NOT AT ALL
SUM OF ALL RESPONSES TO PHQ9 QUESTIONS 1 AND 2: 0

## 2025-04-15 NOTE — PROGRESS NOTES
Radiation Oncology Follow-Up    Patient Name:  Zina Hernandez  MRN:  [id number]  :  1955    Referring Provider: No ref. provider found  Primary Care Provider: Baljinder Avendaño MD  Care Team: Patient Care Team:  Baljinder Avendaño MD as PCP - General (Family Medicine)  Cony Amin MD as Consulting Physician (Hematology and Oncology)  Ailyn Mason MD as Cardiologist (Cardiology)    Date of Service: 4/15/2025     Diagnostic/Therapeutic History:  Cancer History:          Breast         AJCC Edition: 8th (AJCC), Diagnosis Date: 30-Aug-2023, IA, pT1c pN0 cM0 G2     Treatment Synopsis:    - On 2023 screening mammogram she had 2 adjacent irregular spiculated masses in the upper outer left breast at middle depth.  No suspicious masses or calcification  in the right breast.  BI-RADS Category 0     -On 2023 she had targeted ultrasound that showed at 1:00, 4 cm from the nipple an irregular hypoechoic mass measuring 3 x 5 x 4 mm this corresponds to the mammographic finding.  Left axilla showed 4 morphologically normal lymph nodes without lymphadenopathy      -On 2023 she had left breast mass ultrasound guided core needle  invasive lobular carcinoma, grade 2 with  ER 95%, FL 20%, HER2 positive 3+, also had lobular carcinoma in situ     -On 2023 Dr. Blanchard performed left partial mastectomy with sentinel lymph node biopsy () 2 foci of cancer largest 1 measuring 13 mm, second 1 measuring 7 mm, margins were negative final stage [id number]      -2024: completed      - 24 - 24: radiation therapy to left breast consisting of a dose of 42.56 Gy.     - Adjuvant Herceptin x 1 yr completed 10/1/24    SUBJECTIVE  History of Present Illness:   Zina Hernandez is here today for routine radiation follow up/surveillance visit.  She is doing well and reports left well healed and skin intact. She continues exemestane and no adverse effects.  Denies any swelling of left arm or difficulty with ROM.   No headaches, fever, chills, cough, SOB, chest pain, N/V or bony pain. She completed adjuvant Herceptin 10/1/24. Mammogram in June 2024 without evidence of malignancy. DEXA scan - osteopenia. Pt taking Vit D, calcium supps and weight bearing exercise.     Review of Systems:    Review of Systems   All other systems reviewed and are negative.    Performance Status:   The Karnofsky performance scale today is 100.      OBJECTIVE    Current Outpatient Medications:     exemestane (Aromasin) 25 mg tablet, Take 1 tablet (25 mg total) by mouth once daily.  Take after a meal.  Try to take at the same time each day., Disp: 30 tablet, Rfl: 5    losartan (Cozaar) 25 mg tablet, Take 1 tablet (25 mg) by mouth once daily., Disp: 90 tablet, Rfl: 3     Physical Exam  Constitutional:       Appearance: Normal appearance.   HENT:      Nose: Nose normal.      Mouth/Throat:      Mouth: Mucous membranes are moist.      Pharynx: Oropharynx is clear.   Eyes:      Conjunctiva/sclera: Conjunctivae normal.      Pupils: Pupils are equal, round, and reactive to light.   Cardiovascular:      Heart sounds: Normal heart sounds.   Pulmonary:      Breath sounds: Normal breath sounds.   Chest:   Breasts:     Right: No swelling, inverted nipple, mass, nipple discharge or skin change.      Left: No swelling, inverted nipple, mass or nipple discharge.          Comments: Left breast with well healed surgical incision. Mild residual tanning in radiation field.  Skin intact.   Abdominal:      Palpations: Abdomen is soft.   Musculoskeletal:         General: No swelling. Normal range of motion.      Cervical back: Normal range of motion and neck supple.   Lymphadenopathy:      Cervical: No cervical adenopathy.      Upper Body:      Right upper body: No supraclavicular or axillary adenopathy.      Left upper body: No supraclavicular or axillary adenopathy.   Neurological:      General: No focal deficit present.      Mental Status: She is alert and oriented to  person, place, and time.   Psychiatric:         Mood and Affect: Mood normal.         Behavior: Behavior normal.      Narrative    Rad Selected: Y    Interpreted By:  Duyen Toure,  STUDY:  BI MAMMO BILATERAL DIAGNOSTIC TOMOSYNTHESIS;  6/24/2024 9:58 am      ACCESSION NUMBER(S):  [id number]      ORDERING CLINICIAN:  DAVID JAIN      INDICATION:  Left lumpectomy with chemotherapy and radiation.      COMPARISON:  06/14/2023, 09/08/2021      FINDINGS:  MAMMOGRAPHY: 2D and tomosynthesis images were reviewed at 1 mm slice  thickness.      Density:  There are areas of scattered fibroglandular tissue.      There is postsurgical scarring and surgical clips in the upper outer  left breast from previous lumpectomy. There is left axillary  postoperative scarring. Left skin and trabecular thickening is  consistent with radiation therapy changes. No suspicious masses or  calcifications are identified.      IMPRESSION:  Left post treatment changes. No mammographic evidence of malignancy.      BI-RADS CATEGORY:  BI-RADS Category:  2 Benign.  Recommendation:  Annual Screening.  Recommended Date:  1 Year.  Laterality:  Bilateral.      Narrative & Impression   Interpreted By:  Jonathan Del Rosario,   STUDY:  DEXA BONE DENSITY2/22/2024 10:42 am      INDICATION:  Signs/Symptoms:pre anastrozole. The patient is a 67 y/o  year old F.      COMPARISON:  07/05/2013, 06/16/2010 (baseline).      ACCESSION NUMBER(S):  [id number]      ORDERING CLINICIAN:  GÉNESIS WOODS      TECHNIQUE:  DEXA BONE DENSITY      FINDINGS:  SPINE L1-L4  Bone Mineral Density: 1.066  T-Score -0.9  Z-Score 1.0  Bone Mineral Density change vs baseline:  -6.0%  Bone Mineral Density change vs previous: -4.1%      LEFT FEMUR -TOTAL  Bone Mineral Density: 0.758  T-Score -2.0   Z-Score  -0.4  Bone Mineral Density change vs baseline: -4.9%  Bone Mineral Density change vs previous: -1.6%      LEFT FEMUR -NECK  Bone Mineral Density: 0.724  T-Score -2.3  Z-Score -0.5       World Health Organization (WHO) criteria for post-menopausal,   Women:  Normal:         T-score at or above -1 SD  Osteopenia:   T-score between -1 and -2.5 SD  Osteoporosis: T-score at or below -2.5 SD      10-year Fracture Risk:  Major Osteoporotic Fracture  13.2  Hip Fracture                        2.7      Note:  If no FRAX score is reported, it is because:  Some T-score for Spine Total or Hip Total or Femoral Neck at or below  -2.5      This exam was performed at [facility] on a [facility] Dexa Unit.      IMPRESSION:  DEXA:  According to World Health Organization criteria,  classification is low bone mass (osteopenia)          ASSESSMENT/PLAN:  69 y.o. female with early stage triple positive left breast cancer s/p breast conserving surgery followed by adjuvant chemotherapy/Herceptin followed by radiation. Doing well.  Cosmesis is excellent.     She will continue exemestane and follow up with Dr. Amin.  Pt provided written info on bone health. Radiation follow up in 12 mo.  Mammogram and FUV with Dr. Blanchard. Call with any concerns.     Layne Locke CNP  [phone]

## 2025-06-10 NOTE — PROGRESS NOTES
Zina Hernandez female   1955 69 y.o.   [id number]      Chief Complaint  Annual mammogram and exam, history of left breast cancer    History Of Present Illness  Zina Hernandez is a very pleasant 69 y.o.  female diagnosed 2023 with left breast invasive lobular carcinoma, grade 2, ER 95%, DC 20%, HER2 positive. 2023 Mirian Blanchard performed left breast Magseed partial mastectomy and sentinel lymph node biopsy (). Final pathology revealed 1.3 cm and 0.7 cm ILC with negative margins. She completed post-operative radiation on 2024. She completed adjuvant chemotherapy and Herceptin and started Exemestane, currently taking and tolerating well. She presents today for annual mammogram and exam. She denies any new masses or lumps. She is here with her , Josse.   Stage IA pT1c(m)N0Mx    BREAST IMAGIN2024 Bilateral diagnostic mammogram, indicates BI-RADS Cateory 2.     REPRODUCTIVE HISTORY: menarche age 13, , first birth age 27,  x 2 weeks, OCP's x 8 years, natural menopause age 50, no HRT, scattered fibroglandular tissue     FAMILY CANCER HISTORY:   Maternal Grandmother: Uterine cancer, 60s     Surgical History  She has a past surgical history that includes Breast lumpectomy.     Social History  She reports that she has never smoked. She has been exposed to tobacco smoke. She has never used smokeless tobacco. She reports that she does not drink alcohol and does not use drugs.    Family History  Family History[1]     Allergies  Paclitaxel, Tamiflu [oseltamivir], and Adhesive tape-silicones    Medications  Current Outpatient Medications   Medication Instructions    exemestane (AROMASIN) 25 mg, oral, Daily, Take after a meal.  Try to take at the same time each day.    losartan (COZAAR) 25 mg, oral, Daily         REVIEW OF SYSTEMS    Constitutional:  Negative for appetite change, fatigue, fever and unexpected weight change.   HENT:  Negative for ear pain, hearing loss,  nosebleeds, sore throat and trouble swallowing.    Eyes:  Negative for discharge, itching and visual disturbance.   Respiratory:  Negative for cough, chest tightness and shortness of breath.    Cardiovascular:  Negative for chest pain, palpitations and leg swelling.   Breast: as indicated in HPI  Gastrointestinal:  Negative for abdominal pain, constipation, diarrhea and nausea.   Endocrine: Negative for cold intolerance and heat intolerance.   Genitourinary:  Negative for dysuria, frequency, hematuria, pelvic pain and vaginal bleeding.   Musculoskeletal:  Negative for arthralgias, back pain, gait problem, joint swelling and myalgias.   Skin:  Negative for color change and rash.   Allergic/Immunologic: Negative for environmental allergies and food allergies.   Neurological:  Negative for dizziness, tremors, speech difficulty, weakness, numbness and headaches.   Hematological:  Does not bruise/bleed easily.   Psychiatric/Behavioral:  Negative for agitation, dysphoric mood and sleep disturbance. The patient is not nervous/anxious.         Past Medical History  She has a past medical history of Breast cancer, Cancer (Multi), antineoplastic chemo, Migraine, Other chest pain (01/15/2024), Personal history of irradiation, Urinary tract infection, and Varicella.     Physical Exam  Patient is alert and oriented x3 and in a relaxed and appropriate mood. Her gait is steady and hand grasps are equal. Sclera is clear. The breasts are nearly symmetrical. The tissue is soft without palpable abnormalities, discrete nodules or masses. The left breast has a well-healed partial circumareolar incision and a well-healed left axillary incision. The left breast is hyperpigmented consistent with post-radiation changes. The skin and nipples appear normal. There is no cervical, supraclavicular or axillary lymphadenopathy. Heart rate and rhythm normal, S1 and S2 appreciated. The lungs are clear to auscultation bilaterally. There is a  well-healed incision upper right chest wall from previous port placement.    Physical Exam  Chest:              Last Recorded Vitals  Vitals:    06/25/25 1409   BP: 130/64   Pulse: 80   Resp: 16       Relevant Results   Time was spent discussing digital images of the radiology testing with the patient. I explained the results in depth, along with suggested explanation for follow up recommendations based on the testing results. BI-RADS Category 2      Assessment/Plan   Normal clinical exam and imaging, history of left breast cancer, Exemestane therapy, scattered fibroglandular tissue    Plan: Return in one year for bilateral screening mammogram and office visit. Continue Exemestane 25mg daily.     Patient Discussion/Summary  Your clinical examination and imaging are normal. Please return in one year for bilateral screening mammogram and office visit or sooner if you have any problems or concerns. Continue Exemestane 25mg daily.     You can see your health information, review clinical summaries from office visits & test results online when you follow your health with MY  Chart, a personal health record. To sign up go to [email or web address]. If you need assistance with signing up or trouble getting into your account call [employer] Patient Line 24/7 at [phone].    My office phone number is [phone] if you need to get in touch with me or have additional questions or concerns. Thank you for choosing [facility] and trusting me as your healthcare provider. I look forward to seeing you again at your next office visit. I am honored to be a provider on your health care team and I remain dedicated to helping you achieve your health goals.      Glendy Reeves, APRN-CNP         [1]   Family History  Problem Relation Name Age of Onset    Hypertension Mother Erica Latsnic     Diabetes Mother Erica Latsnic     Other (cardiac disorder) Mother Erica Latsnic     Heart disease Mother Erica Latsnic     Kidney  disease Mother Erica Latsnic     Miscarriages / Stillbirths Mother Erica Latsnic     Diabetes type II Mother Erica Latsnic     Heart attack Mother Erica Latsnic     Obesity Mother Erica Latsnic     Hypertension Father Donny Latsnic     Diabetes Father Donny Latsnic     Stroke Father Donny Latsnic     Diabetes type II Father Donny Latsnic     Obesity Father Donny Latsnic     Cancer Other Grandparent     Cancer Maternal Grandmother Cindy Real     Cancer Mother's Sister Lainey Irvin     Cancer Father's Sister Cindy Prieto

## 2025-06-16 NOTE — PROGRESS NOTES
Patient ID: Zina Hernandez is a 69 y.o. female.    The patient presents to clinic today for her history of breast cancer.     Cancer Staging   Malignant neoplasm of upper-outer quadrant of left breast in female, estrogen receptor positive  Staging form: Breast, AJCC 8th Edition  - Pathologic: Stage IA (pT1c, pN0, cM0, G2, ER+, NJ+, HER2+) - Signed by Mirian Blanchard DO on 6/24/2024    Diagnostic/Therapeutic History:    The patient presents to clinic today for her history of breast cancer.     Diagnostic/Therapeutic History:  Cancer History:          Breast         AJCC Edition: 8th (AJCC), Diagnosis Date: 30-Aug-2023, IA, pT1c pN0 cM0 G2     Treatment Synopsis:    - On 6/14/2023 screening mammogram she had 2 adjacent irregular spiculated masses in the upper outer left breast at middle depth.  No suspicious masses or calcification  in the right breast.  BI-RADS Category 0     -On 6/28/2023 she had targeted ultrasound that showed at 1:00, 4 cm from the nipple an irregular hypoechoic mass measuring 3 x 5 x 4 mm this corresponds to the mammographic finding.  Left axilla showed 4 morphologically normal lymph nodes without lymphadenopathy      -On 7/14/2023 she had left breast mass ultrasound guided core needle  invasive lobular carcinoma, grade 2 with  ER 95%, NJ 20%, HER2 positive 3+, also had lobular carcinoma in situ     -On 8/30/2023 Dr. Blanchard performed left partial mastectomy with sentinel lymph node biopsy (0/1) 2 foci of cancer largest 1 measuring 13 mm, second 1 measuring 7 mm, margins were negative final stage [id number]     -on 01/02/2024: completed TH    - On 02/20/2024: completed radiation therapy    - 06/24/2024: mammogram negative     - completed herceptin in October 2024      History of Present Illness (HPI)/Interval History:  Ms. Hernandez presents today for follow-up, treatment and surveillance. She is complaint with exemestane.     She denies any new breast cancer concerns.     She denies any chest pain  "or breathing issues.     She denies any vision changes,  dizziness, loss of balance or falls. Occasional migraines, maybe twice a year.\" Last a half hour.     She denies any new or unexplained bone aches or pains. Generalized arthralgias/myalgias. She has scoliosis in her low back/hip, so occasional achiness but nothing new. She notes ROM is better.     She denies any skin lesions or masses.    She reports a normal appetite.     She sleeps fairly well, some nights better than others. Energy is good throughout the day.  She gets moments of being flushes, not as bad as natural menopause - tolerable.     She walks at least 20 minutes daily.     PMH: as above     Meds: allergies reviewed      Reproductive history: Menarche at 13, AFLB: 27,  menopause at 50, no HRT     Family history maternal grandmother with uterine cancer in her 60s      Review of Systems:  14-point ROS otherwise negative, as per HPI.    Past Medical History:   Diagnosis Date    Breast cancer     Cancer (Multi)     Hx antineoplastic chemo     Migraine     Other chest pain 01/15/2024    Personal history of irradiation     Urinary tract infection     Varicella        Past Surgical History:   Procedure Laterality Date    BREAST LUMPECTOMY         Social History     Socioeconomic History    Marital status:    Tobacco Use    Smoking status: Never     Passive exposure: Past    Smokeless tobacco: Never   Vaping Use    Vaping status: Never Used   Substance and Sexual Activity    Alcohol use: Never    Drug use: Never    Sexual activity: Yes     Partners: Male     Birth control/protection: None     Comment: took birth control pills for 15 years     Social Drivers of Health     Financial Resource Strain: Low Risk  (6/8/2025)    Received from [facility]    Overall Financial Resource Strain (CARDIA)     Difficulty of Paying Living Expenses: Not hard at all   Food Insecurity: No Food Insecurity (6/8/2025)    Received from [facility]    Hunger Vital " Sign     Worried About Running Out of Food in the Last Year: Never true     Ran Out of Food in the Last Year: Never true   Transportation Needs: No Transportation Needs (6/8/2025)    Received from [facility]    PRAPARE - Transportation     Lack of Transportation (Medical): No     Lack of Transportation (Non-Medical): No   Physical Activity: Insufficiently Active (6/8/2025)    Received from [facility]    Exercise Vital Sign     Days of Exercise per Week: 5 days     Minutes of Exercise per Session: 20 min   Stress: No Stress Concern Present (6/8/2025)    Received from [facility] Occupational Health - Occupational Stress Questionnaire     Feeling of Stress : Not at all   Social Connections: Moderately Integrated (6/8/2025)    Received from [facility]    Social Connection and Isolation Panel [NHANES]     Frequency of Communication with Friends and Family: More than three times a week     Frequency of Social Gatherings with Friends and Family: More than three times a week     Attends Gnosticism Services: More than 4 times per year     Active Member of Clubs or Organizations: No     Attends Club or Organization Meetings: Never     Marital Status: Living with partner   Intimate Partner Violence: Not At Risk (6/8/2025)    Received from [facility]    Humiliation, Afraid, Rape, and Kick questionnaire     Fear of Current or Ex-Partner: No     Emotionally Abused: No     Physically Abused: No     Sexually Abused: No   Housing Stability: Low Risk  (6/8/2025)    Received from [facility]    Housing Stability Vital Sign     Unable to Pay for Housing in the Last Year: No     Number of Times Moved in the Last Year: 0     Homeless in the Last Year: No       Allergies   Allergen Reactions    Paclitaxel Other     Back pain and chest pain.     Tamiflu [Oseltamivir] Unknown    Adhesive Tape-Silicones Rash         Current Outpatient Medications:     exemestane (Aromasin) 25 mg tablet,  Take 1 tablet (25 mg total) by mouth once daily.  Take after a meal.  Try to take at the same time each day., Disp: 30 tablet, Rfl: 5    losartan (Cozaar) 25 mg tablet, Take 1 tablet (25 mg) by mouth once daily., Disp: 90 tablet, Rfl: 3     Objective    BSA: 1.61 meters squared  /79 (BP Location: Right arm, Patient Position: Sitting, BP Cuff Size: Adult)   Pulse 77   Temp 37.1 °C (98.8 °F) (Temporal)   Resp 18   Wt 62.2 kg (137 lb 1.6 oz)   SpO2 97%   BMI 27.69 kg/m²     ECOG Performance Status: 0    Physical Exam  Vitals reviewed.   Constitutional:       General: She is not in acute distress.     Appearance: She is not toxic-appearing.   HENT:      Mouth/Throat:      Mouth: Mucous membranes are moist.      Pharynx: Oropharynx is clear.   Eyes:      General: No scleral icterus.     Extraocular Movements: Extraocular movements intact.      Conjunctiva/sclera: Conjunctivae normal.   Cardiovascular:      Rate and Rhythm: Normal rate and regular rhythm.   Pulmonary:      Effort: Pulmonary effort is normal. No respiratory distress.   Chest:      Comments: Deferred today given upcoming mammogram and breast exam with surgery team. Recent exam also completed with radiation oncology.   Musculoskeletal:         General: No swelling or tenderness.   Skin:     General: Skin is warm and dry.      Coloration: Skin is not jaundiced.   Neurological:      General: No focal deficit present.      Mental Status: She is alert and oriented to person, place, and time.   Psychiatric:         Mood and Affect: Mood normal.         Behavior: Behavior normal.         Thought Content: Thought content normal.         Judgment: Judgment normal.         Laboratory Data:  Lab Results   Component Value Date    WBC 4.4 10/28/2024    HGB 12.6 10/28/2024    HCT 39.7 10/28/2024    MCV 88 10/28/2024     10/28/2024    ANC 2.31 11/21/2023       Chemistry    Lab Results   Component Value Date/Time     09/10/2024 0918    K 3.5  09/10/2024 0918     09/10/2024 0918    CO2 29 09/10/2024 0918    BUN 19 09/10/2024 0918    CREATININE 1.16 (H) 09/10/2024 0918    Lab Results   Component Value Date/Time    CALCIUM 10.2 09/10/2024 0918    ALKPHOS 86 09/10/2024 0918    AST 23 09/10/2024 0918    ALT 17 09/10/2024 0918    BILITOT 0.4 09/10/2024 0918             Radiology:  Onco-Echo Limited (Strain And 3D)              [facility] [place]  [id number] [address] [address]     Tel [phone] Fax [phone]    TRANSTHORACIC ECHOCARDIOGRAM REPORT    Patient Name:       HUNTER VELA    Reading Physician:    [id number] Mason MD  Study Date:         11/18/2024          Ordering Provider:    Glenda MASON  MRN/PID:            [id number]            Fellow:  Accession#:         [id number]        Nurse:  Date of Birth/Age:  1955 / 69      Sonographer:          Sidra patrick                                     [facility]  Gender Assigned at  F                   Additional Staff:  Birth:  Height:             149.86 cm           Admit Date:  Weight:             58.97 kg            Admission Status:     Outpatient  BSA / BMI:          1.54 m2 / 26.26     Department Location:  [facility] Echo Lab                      kg/m2  Blood Pressure: 139 /66 mmHg    Study Type:    ONCO-ECHO LIMITED (STRAIN AND 3D)  Diagnosis/ICD: Encounter for monitoring cardiotoxic drug therapy-Z51.81  Indication:    Encounter for drug therapy  CPT Codes:     Echo Limited-34145   Study Detail: The following Echo studies were performed: 2D, Doppler and color                flow. Technically challenging study due to poor acoustic windows.       PHYSICIAN INTERPRETATION:  Left Ventricle: Left ventricular ejection fraction is normal, calculated by 3D at 65%. There are no regional wall motion abnormalities. The  left ventricular cavity size is normal. There is normal septal and mildly increased posterior left ventricular wall thickness. There is no evidence of left ventricular hypertrophy. There is left ventricular concentric remodeling. Left Ventricular Global Longitudinal Strain - -20.7 %. Spectral Doppler shows a normal pattern of left ventricular diastolic filling. There is no definite left ventricular thrombus visualized. The intraventricular septum appears intact without evidence of shunting or a ventricular septal defect.  Left Atrium: The left atrium is normal in size.  Right Ventricle: The right ventricle is normal in size. There is normal right ventricular global systolic function.  Right Atrium: The right atrium is normal in size.  Aortic Valve: The aortic valve was not assessed. Aortic valve regurgitation was not assessed.  Mitral Valve: The mitral valve is mildly thickened. There is no evidence of mitral valve prolapse. There is mild to moderate mitral annular calcification. Mitral valve regurgitation was not assessed.  Tricuspid Valve: The tricuspid valve is structurally normal. Tricuspid regurgitation was not assessed.  Pulmonic Valve: The pulmonic valve was not assessed. The pulmonic valve regurgitation was not assessed.  Pericardium: No pericardial effusion noted.  Aorta: The aortic root is normal.  Systemic Veins: The inferior vena cava was not assessed.  In comparison to the previous echocardiogram(s): Compared with study dated 8/21/2024,. No significant change.     ONCO-CARDIOLOGY:  Machine: This study was performed on the Tamie Epic.  Previous Study: The patient had a previous Onco-Cardiology echocardiogram dated  8/21/2024.       Onco-Cardiology Measurements:  Historical Measurements from Previous Study  2D EF (Biplane)                     63%  3D EF                               64%  Global Longitudinal Strain (GLS) -19.3%    Current Measurements  2D EF (Biplane)                     64%  3D EF                                68%  Global Longitudinal Strain (GLS) -20.7%    GLS Tracking Quality:       CONCLUSIONS:   1. Left ventricular ejection fraction is normal, calculated by 3D at 65%.   2. Intact intraventricular septum without shunting or a ventricular septal defect.   3. No left ventricular thrombus visualized.   4. There is no evidence of left ventricular hypertrophy.   5. There is normal right ventricular global systolic function.   6. No evidence of mitral valve prolapse.   7. No significant change.    QUANTITATIVE DATA SUMMARY:     2D MEASUREMENTS:           Normal Ranges:  IVSd:            0.80 cm   (0.6-1.1cm)  LVPWd:           0.90 cm   (0.6-1.1cm)  LVIDd:           3.80 cm   (3.9-5.9cm)  LVIDs:           2.10 cm  LV Mass Index:   60.8 g/m2  LV % FS          44.7 %       LV SYSTOLIC FUNCTION BY 2D PLANIMETRY (MOD):                                          Normal Ranges:  EF-A4C View:                      64 %  (>=55%)  EF-A2C View:                      64 %  EF-Biplane:                       64 %  EF-3DQ:                           65 %  LV EF Reported:                   65 %  Global Longitudinal Strain (GLS): -21 %       LV DIASTOLIC FUNCTION:            Normal Ranges:  MV Peak A:             0.79 m/s   (0.42-0.7 m/s)  E/A Ratio:             1.30       (1.0-2.2)  MV e'                  0.101 m/s  (>8.0)  MV lateral e'          0.10 m/s  MV medial e'           0.10 m/s  PulmV Sys Steve:         40.70 cm/s  PulmV Edge Steve:        39.00 cm/s  PulmV S/D Steve:         1.00  PulmV A Revs Steve:      19.70 cm/s  PulmV A Revs Dur:      87.00 msec       MITRAL VALVE:          Normal Ranges:  MV DT:        246 msec (150-240msec)       Pulmonary Veins:  PulmV A Revs Dur: 87.00 msec  PulmV A Revs Steve: 19.70 cm/s  PulmV Edge Steve:   39.00 cm/s  PulmV S/D Steve:    1.00  PulmV Sys Steve:    40.70 cm/s       [id number] Ailyn Mason MD  Electronically signed on 11/18/2024 at 10:11:20 PM       ** Final **       BI mammo bilateral  screening tomosynthesis 06/14/2023    Narrative  Interpreted By:  ENZO MANUEL MD  MRN: [id number]  Patient Name: HUNTER VELA    STUDY:  DIGITAL MAMM SCREENING W/ RUBI;  6/14/2023 8:00 am    ACCESSION NUMBER(S):  [id number]    ORDERING CLINICIAN:  KATIE HINOJOSA    INDICATION:  Screening.    COMPARISON:  09/08/2021, 07/17/2020, 01/17/2019    FINDINGS:  2D and tomosynthesis images were reviewed at 1 mm slice thickness.    There are areas of scattered fibroglandular tissue.  There are 2  adjacent irregular spiculated equal density masses in the upper outer  left breast at middle depth. No suspicious masses or calcifications  are identified in the right breast.    Impression  No mammographic evidence of malignancy in the right breast. Left  breast mass x2.    BI-RADS CATEGORY:    Category: 0 - Incomplete; Need Additional Imaging Evaluation and/or  Prior Mammograms for Comparison.  Recommendation: Ultrasound Recommended.    For any future breast imaging appointments, please call [phone] ([phone]).    Patient letter sent SADEVAL          Assessment/Plan:    69-year-old female previously healthy found to have on screening mammogram 2 adjacent irregular spiculated masses in the upper outer left breast at middle depth with  US showing a 5mm mass with left axilla showing 4 negative LN s/p  guided core needle  invasive lobular carcinoma, grade 2 with  ER 95%, VA 20%, HER2 positive 3+, also had lobular carcinoma in situ. Dr. Blanchard performed left partial mastectomy with sentinel  lymph node biopsy (0/1) 2 foci of cancer largest 1 measuring 13 mm, second 1 measuring 7 mm, margins were negative final stage PT1CN0. She is presenting to discuss adjuvant treatment options.      I reviewed with her the events that led to her diagnosis of breast cancer. We reviewed all the procedures and diagnostic imaging she underwent thus far. I discussed the features of her breast cancer that include the size, grade, lymph node  status and  hormone receptor/ her2-ying status.     Based on APT trial recommended to proceed with THx12 weeks followed by radiation followed by hormonal therapy and herceptin to complete for a total of 1 year     Based on the updated 10 year results of the APT trial published in the lancet in March of this year:      410 patients were enrolled and 406 were given adjuvant paclitaxel and trastuzumab and included in the analysis. Mean age at enrolment was 55 years (SD 10·5), 405 (99·8%) of 406 patients were female and one (0·2%) was male, 350 (86·2%) were  White, 28 (6·9%) were Black or , and 272 (67·0%) had hormone receptor-positive disease. After a median follow-up of 10·8 years (IQR 7·1-11·4), among 406 patients included in the analysis population, we observed 31  invasive disease-free survival events, of which six (19·4%) were locoregional ipsilateral recurrences, nine (29·0%) were new contralateral breast cancers, six (19·4%) were distant recurrences, and ten (32·3%) were all-cause deaths.  10-year invasive disease-free survival was 91·3% (95% CI 88·3-94·4), 10-year recurrence-free interval was 96·3% (95% CI 94·3-98·3), 10-year overall survival was 94·3% (95% CI 91·8-96·8), and 10-year breast  cancer-specific survival was 98·8% (95% CI 97·6-100)    on 01/02/2024: completed TH    On 02/20/2024: completed radiation therapy    3/2024: initiated aromatase inhibitor     Oct 2024: completed herceptin    Echo showed 12% Decrease In EF- New echo: 08/21/2024: EF: 60-65%- Last echo on 11/18/2024    - Losartan 12.5 mg daily  - Continue exemestane   - Mammogram scheduled from 6/25/25 with breast surgery   - DEXA: -2.3 osteopenia. Given worsening CKD: check vit d levels today   - RTC in 4 months with Rosalina Bull PA-C     There is no evidence of breast cancer recurrence based on her clinical exam today.     At least 30 minutes of direct consultation was spent reviewing the patient's chart as well as  discussing and  reviewing the  cancer care plan including educating and answering  questions and concerns, greater than 50 percent spent in counseling and coordination  of care.      Rosalina Bull PA-C  Hematology and Medical Oncology  [facility]

## 2025-06-17 ENCOUNTER — LAB (OUTPATIENT)
Dept: LAB | Facility: CLINIC | Age: 70
End: 2025-06-17
Payer: MEDICARE

## 2025-06-17 ENCOUNTER — OFFICE VISIT (OUTPATIENT)
Dept: HEMATOLOGY/ONCOLOGY | Facility: CLINIC | Age: 70
End: 2025-06-17
Payer: MEDICARE

## 2025-06-17 VITALS
BODY MASS INDEX: 27.69 KG/M2 | WEIGHT: 137.1 LBS | RESPIRATION RATE: 18 BRPM | TEMPERATURE: 98.8 F | SYSTOLIC BLOOD PRESSURE: 137 MMHG | HEART RATE: 77 BPM | DIASTOLIC BLOOD PRESSURE: 79 MMHG | OXYGEN SATURATION: 97 %

## 2025-06-17 DIAGNOSIS — N18.31 STAGE 3A CHRONIC KIDNEY DISEASE (MULTI): ICD-10-CM

## 2025-06-17 DIAGNOSIS — Z17.0 MALIGNANT NEOPLASM OF UPPER-OUTER QUADRANT OF LEFT BREAST IN FEMALE, ESTROGEN RECEPTOR POSITIVE: ICD-10-CM

## 2025-06-17 DIAGNOSIS — C50.912 INVASIVE LOBULAR CARCINOMA OF LEFT BREAST IN FEMALE: Primary | ICD-10-CM

## 2025-06-17 DIAGNOSIS — C50.412 MALIGNANT NEOPLASM OF UPPER-OUTER QUADRANT OF LEFT BREAST IN FEMALE, ESTROGEN RECEPTOR POSITIVE: ICD-10-CM

## 2025-06-17 DIAGNOSIS — C50.912 INVASIVE LOBULAR CARCINOMA OF LEFT BREAST IN FEMALE: ICD-10-CM

## 2025-06-17 LAB — 25(OH)D3 SERPL-MCNC: 119 NG/ML (ref 30–100)

## 2025-06-17 PROCEDURE — 82306 VITAMIN D 25 HYDROXY: CPT

## 2025-06-17 PROCEDURE — 1126F AMNT PAIN NOTED NONE PRSNT: CPT

## 2025-06-17 PROCEDURE — 1159F MED LIST DOCD IN RCRD: CPT

## 2025-06-17 PROCEDURE — 1036F TOBACCO NON-USER: CPT

## 2025-06-17 PROCEDURE — 99215 OFFICE O/P EST HI 40 MIN: CPT

## 2025-06-17 PROCEDURE — 36415 COLL VENOUS BLD VENIPUNCTURE: CPT

## 2025-06-17 RX ORDER — EXEMESTANE 25 MG/1
25 TABLET ORAL DAILY
Qty: 30 TABLET | Refills: 11 | Status: SHIPPED | OUTPATIENT
Start: 2025-06-17 | End: 2026-06-17

## 2025-06-17 ASSESSMENT — PAIN SCALES - GENERAL: PAINLEVEL_OUTOF10: 0-NO PAIN

## 2025-06-25 ENCOUNTER — OFFICE VISIT (OUTPATIENT)
Dept: SURGICAL ONCOLOGY | Facility: HOSPITAL | Age: 70
End: 2025-06-25
Payer: MEDICARE

## 2025-06-25 ENCOUNTER — HOSPITAL ENCOUNTER (OUTPATIENT)
Dept: RADIOLOGY | Facility: HOSPITAL | Age: 70
Discharge: HOME | End: 2025-06-25
Payer: MEDICARE

## 2025-06-25 VITALS
WEIGHT: 137 LBS | SYSTOLIC BLOOD PRESSURE: 130 MMHG | RESPIRATION RATE: 16 BRPM | DIASTOLIC BLOOD PRESSURE: 64 MMHG | HEART RATE: 80 BPM | BODY MASS INDEX: 27.62 KG/M2 | HEIGHT: 59 IN

## 2025-06-25 DIAGNOSIS — Z12.31 ENCOUNTER FOR SCREENING MAMMOGRAM FOR MALIGNANT NEOPLASM OF BREAST: ICD-10-CM

## 2025-06-25 DIAGNOSIS — C50.412 MALIGNANT NEOPLASM OF UPPER-OUTER QUADRANT OF LEFT BREAST IN FEMALE, ESTROGEN RECEPTOR POSITIVE: ICD-10-CM

## 2025-06-25 DIAGNOSIS — Z79.811 USE OF EXEMESTANE (AROMASIN): ICD-10-CM

## 2025-06-25 DIAGNOSIS — Z08 ENCOUNTER FOR FOLLOW-UP SURVEILLANCE OF BREAST CANCER: Primary | ICD-10-CM

## 2025-06-25 DIAGNOSIS — Z17.0 MALIGNANT NEOPLASM OF UPPER-OUTER QUADRANT OF LEFT BREAST IN FEMALE, ESTROGEN RECEPTOR POSITIVE: ICD-10-CM

## 2025-06-25 DIAGNOSIS — Z85.3 ENCOUNTER FOR FOLLOW-UP SURVEILLANCE OF BREAST CANCER: Primary | ICD-10-CM

## 2025-06-25 PROCEDURE — 77066 DX MAMMO INCL CAD BI: CPT

## 2025-06-25 PROCEDURE — 99213 OFFICE O/P EST LOW 20 MIN: CPT | Performed by: NURSE PRACTITIONER

## 2025-06-25 PROCEDURE — 77066 DX MAMMO INCL CAD BI: CPT | Performed by: RADIOLOGY

## 2025-06-25 PROCEDURE — 3008F BODY MASS INDEX DOCD: CPT | Performed by: NURSE PRACTITIONER

## 2025-06-25 PROCEDURE — 1159F MED LIST DOCD IN RCRD: CPT | Performed by: NURSE PRACTITIONER

## 2025-06-25 PROCEDURE — 77062 BREAST TOMOSYNTHESIS BI: CPT | Performed by: RADIOLOGY

## 2025-06-25 PROCEDURE — 1036F TOBACCO NON-USER: CPT | Performed by: NURSE PRACTITIONER

## 2025-06-25 NOTE — PATIENT INSTRUCTIONS
Your clinical examination and imaging are normal. Please return in one year for bilateral screening mammogram and office visit or sooner if you have any problems or concerns. Continue Exemestane 25mg daily.     You can see your health information, review clinical summaries from office visits & test results online when you follow your health with MY  Chart, a personal health record. To sign up go to [email or web address]. If you need assistance with signing up or trouble getting into your account call [facility] Patient Line 24/7 at [phone].    My office phone number is [phone] if you need to get in touch with me or have additional questions or concerns. Thank you for choosing [facility] and trusting me as your healthcare provider. I look forward to seeing you again at your next office visit. I am honored to be a provider on your health care team and I remain dedicated to helping you achieve your health goals.

## 2025-09-10 ENCOUNTER — APPOINTMENT (OUTPATIENT)
Dept: OBSTETRICS AND GYNECOLOGY | Facility: CLINIC | Age: 70
End: 2025-09-10
Payer: MEDICARE

## 2025-11-17 ENCOUNTER — APPOINTMENT (OUTPATIENT)
Dept: CARDIOLOGY | Facility: CLINIC | Age: 70
End: 2025-11-17
Payer: MEDICARE

## 2025-12-01 ENCOUNTER — APPOINTMENT (OUTPATIENT)
Dept: CARDIOLOGY | Facility: CLINIC | Age: 70
End: 2025-12-01
Payer: MEDICARE

## 2025-12-02 ENCOUNTER — APPOINTMENT (OUTPATIENT)
Dept: CARDIOLOGY | Facility: CLINIC | Age: 70
End: 2025-12-02
Payer: MEDICARE